# Patient Record
Sex: FEMALE | Race: WHITE | Employment: PART TIME | URBAN - METROPOLITAN AREA
[De-identification: names, ages, dates, MRNs, and addresses within clinical notes are randomized per-mention and may not be internally consistent; named-entity substitution may affect disease eponyms.]

---

## 2018-05-23 DIAGNOSIS — I10 ESSENTIAL HYPERTENSION: ICD-10-CM

## 2018-05-23 RX ORDER — ETODOLAC 400 MG/1
TABLET, FILM COATED ORAL
Qty: 60 TABLET | Refills: 5 | Status: SHIPPED | OUTPATIENT
Start: 2018-05-23 | End: 2019-01-29

## 2018-05-23 RX ORDER — AMLODIPINE BESYLATE AND BENAZEPRIL HYDROCHLORIDE 5; 10 MG/1; MG/1
CAPSULE ORAL
Qty: 30 CAPSULE | Refills: 5 | Status: SHIPPED | OUTPATIENT
Start: 2018-05-23 | End: 2018-11-16 | Stop reason: SDUPTHER

## 2018-05-23 RX ORDER — CYCLOBENZAPRINE HCL 10 MG
10 TABLET ORAL NIGHTLY PRN
Qty: 30 TABLET | Refills: 5 | Status: SHIPPED | OUTPATIENT
Start: 2018-05-23 | End: 2018-11-16 | Stop reason: SDUPTHER

## 2018-07-23 DIAGNOSIS — F41.9 ANXIETY: ICD-10-CM

## 2018-07-23 RX ORDER — FLUOXETINE HYDROCHLORIDE 20 MG/1
CAPSULE ORAL
Qty: 90 CAPSULE | Refills: 5 | Status: SHIPPED | OUTPATIENT
Start: 2018-07-23 | End: 2018-11-16 | Stop reason: SDUPTHER

## 2018-11-16 DIAGNOSIS — I10 ESSENTIAL HYPERTENSION: ICD-10-CM

## 2018-11-16 DIAGNOSIS — F41.9 ANXIETY: ICD-10-CM

## 2018-11-16 RX ORDER — CYCLOBENZAPRINE HCL 10 MG
10 TABLET ORAL NIGHTLY PRN
Qty: 30 TABLET | Refills: 5 | Status: SHIPPED | OUTPATIENT
Start: 2018-11-16 | End: 2019-05-17 | Stop reason: SDUPTHER

## 2018-11-16 RX ORDER — AMLODIPINE BESYLATE AND BENAZEPRIL HYDROCHLORIDE 5; 10 MG/1; MG/1
CAPSULE ORAL
Qty: 30 CAPSULE | Refills: 5 | Status: SHIPPED | OUTPATIENT
Start: 2018-11-16 | End: 2019-05-17 | Stop reason: SDUPTHER

## 2018-11-16 RX ORDER — FLUOXETINE HYDROCHLORIDE 20 MG/1
CAPSULE ORAL
Qty: 90 CAPSULE | Refills: 5 | Status: SHIPPED | OUTPATIENT
Start: 2018-11-16 | End: 2019-05-17 | Stop reason: SDUPTHER

## 2019-01-29 ENCOUNTER — OFFICE VISIT (OUTPATIENT)
Dept: FAMILY MEDICINE CLINIC | Age: 49
End: 2019-01-29

## 2019-01-29 VITALS
HEART RATE: 96 BPM | TEMPERATURE: 98 F | BODY MASS INDEX: 25.83 KG/M2 | HEIGHT: 65 IN | OXYGEN SATURATION: 99 % | DIASTOLIC BLOOD PRESSURE: 80 MMHG | WEIGHT: 155 LBS | SYSTOLIC BLOOD PRESSURE: 138 MMHG

## 2019-01-29 DIAGNOSIS — M25.50 ARTHRALGIA, UNSPECIFIED JOINT: Primary | ICD-10-CM

## 2019-01-29 PROCEDURE — 99213 OFFICE O/P EST LOW 20 MIN: CPT | Performed by: FAMILY MEDICINE

## 2019-01-29 RX ORDER — METRONIDAZOLE 500 MG/1
500 TABLET ORAL 2 TIMES DAILY
COMMUNITY
End: 2019-02-28

## 2019-01-29 ASSESSMENT — PATIENT HEALTH QUESTIONNAIRE - PHQ9
SUM OF ALL RESPONSES TO PHQ QUESTIONS 1-9: 0
SUM OF ALL RESPONSES TO PHQ QUESTIONS 1-9: 0
SUM OF ALL RESPONSES TO PHQ9 QUESTIONS 1 & 2: 0
2. FEELING DOWN, DEPRESSED OR HOPELESS: 0
1. LITTLE INTEREST OR PLEASURE IN DOING THINGS: 0

## 2019-02-28 ENCOUNTER — OFFICE VISIT (OUTPATIENT)
Dept: FAMILY MEDICINE CLINIC | Age: 49
End: 2019-02-28

## 2019-02-28 ENCOUNTER — HOSPITAL ENCOUNTER (OUTPATIENT)
Age: 49
Discharge: HOME | End: 2019-03-02

## 2019-02-28 VITALS
TEMPERATURE: 98.5 F | SYSTOLIC BLOOD PRESSURE: 138 MMHG | WEIGHT: 154 LBS | OXYGEN SATURATION: 98 % | BODY MASS INDEX: 25.63 KG/M2 | HEART RATE: 84 BPM | DIASTOLIC BLOOD PRESSURE: 84 MMHG

## 2019-02-28 DIAGNOSIS — H01.02B SQUAMOUS BLEPHARITIS OF UPPER AND LOWER EYELIDS OF BOTH EYES: ICD-10-CM

## 2019-02-28 DIAGNOSIS — R30.0 DYSURIA: Primary | ICD-10-CM

## 2019-02-28 DIAGNOSIS — H01.02A SQUAMOUS BLEPHARITIS OF UPPER AND LOWER EYELIDS OF BOTH EYES: ICD-10-CM

## 2019-02-28 DIAGNOSIS — R30.0 DYSURIA: ICD-10-CM

## 2019-02-28 LAB
BILIRUBIN, POC: NEGATIVE
BLOOD URINE, POC: NORMAL
CLARITY, POC: CLEAR
COLOR, POC: YELLOW
GLUCOSE URINE, POC: NEGATIVE
KETONES, POC: NEGATIVE
LEUKOCYTE EST, POC: NORMAL
NITRITE, POC: NEGATIVE
PH, POC: 5.5
PROTEIN, POC: NORMAL
SPECIFIC GRAVITY, POC: 1.01
UROBILINOGEN, POC: 0.2

## 2019-02-28 PROCEDURE — 87088 URINE BACTERIA CULTURE: CPT

## 2019-02-28 PROCEDURE — 81002 URINALYSIS NONAUTO W/O SCOPE: CPT | Performed by: FAMILY MEDICINE

## 2019-02-28 PROCEDURE — 99213 OFFICE O/P EST LOW 20 MIN: CPT | Performed by: FAMILY MEDICINE

## 2019-02-28 PROCEDURE — 87186 SC STD MICRODIL/AGAR DIL: CPT

## 2019-02-28 RX ORDER — NITROFURANTOIN 25; 75 MG/1; MG/1
100 CAPSULE ORAL 2 TIMES DAILY
Qty: 14 CAPSULE | Refills: 0 | Status: SHIPPED | OUTPATIENT
Start: 2019-02-28 | End: 2019-03-07

## 2019-03-03 LAB
ORGANISM: ABNORMAL
URINE CULTURE, ROUTINE: ABNORMAL
URINE CULTURE, ROUTINE: ABNORMAL

## 2019-03-11 ENCOUNTER — HOSPITAL ENCOUNTER (OUTPATIENT)
Age: 49
Discharge: HOME | End: 2019-03-13

## 2019-03-11 ENCOUNTER — OFFICE VISIT (OUTPATIENT)
Dept: FAMILY MEDICINE CLINIC | Age: 49
End: 2019-03-11

## 2019-03-11 VITALS
TEMPERATURE: 98.1 F | OXYGEN SATURATION: 95 % | SYSTOLIC BLOOD PRESSURE: 124 MMHG | HEART RATE: 100 BPM | BODY MASS INDEX: 24.79 KG/M2 | WEIGHT: 149 LBS | DIASTOLIC BLOOD PRESSURE: 80 MMHG

## 2019-03-11 DIAGNOSIS — R21 RASH: ICD-10-CM

## 2019-03-11 DIAGNOSIS — R21 RASH: Primary | ICD-10-CM

## 2019-03-11 LAB
ALBUMIN SERPL-MCNC: 4.1 G/DL (ref 3.5–5.2)
ALP BLD-CCNC: 111 U/L (ref 35–104)
ALT SERPL-CCNC: 52 U/L (ref 0–32)
ANION GAP SERPL CALCULATED.3IONS-SCNC: 16 MMOL/L (ref 7–16)
AST SERPL-CCNC: 34 U/L (ref 0–31)
BASOPHILS ABSOLUTE: 0 E9/L (ref 0–0.2)
BASOPHILS RELATIVE PERCENT: 0.5 % (ref 0–2)
BILIRUB SERPL-MCNC: 0.2 MG/DL (ref 0–1.2)
BILIRUBIN, POC: NEGATIVE
BLOOD URINE, POC: NEGATIVE
BUN BLDV-MCNC: 7 MG/DL (ref 6–20)
BURR CELLS: ABNORMAL
CALCIUM SERPL-MCNC: 9.3 MG/DL (ref 8.6–10.2)
CHLORIDE BLD-SCNC: 95 MMOL/L (ref 98–107)
CLARITY, POC: CLEAR
CO2: 23 MMOL/L (ref 22–29)
COLOR, POC: YELLOW
CREAT SERPL-MCNC: 0.6 MG/DL (ref 0.5–1)
EOSINOPHILS ABSOLUTE: 0.37 E9/L (ref 0.05–0.5)
EOSINOPHILS RELATIVE PERCENT: 3.5 % (ref 0–6)
GFR AFRICAN AMERICAN: >60
GFR NON-AFRICAN AMERICAN: >60 ML/MIN/1.73
GLUCOSE BLD-MCNC: 82 MG/DL (ref 74–99)
GLUCOSE URINE, POC: NEGATIVE
HCT VFR BLD CALC: 43.8 % (ref 34–48)
HEMOGLOBIN: 14.5 G/DL (ref 11.5–15.5)
KETONES, POC: NEGATIVE
LEUKOCYTE EST, POC: NEGATIVE
LYMPHOCYTES ABSOLUTE: 3.64 E9/L (ref 1.5–4)
LYMPHOCYTES RELATIVE PERCENT: 33.9 % (ref 20–42)
MCH RBC QN AUTO: 32.1 PG (ref 26–35)
MCHC RBC AUTO-ENTMCNC: 33.1 % (ref 32–34.5)
MCV RBC AUTO: 96.9 FL (ref 80–99.9)
METAMYELOCYTES RELATIVE PERCENT: 0.9 % (ref 0–1)
MONOCYTES ABSOLUTE: 1.07 E9/L (ref 0.1–0.95)
MONOCYTES RELATIVE PERCENT: 9.6 % (ref 2–12)
NEUTROPHILS ABSOLUTE: 5.67 E9/L (ref 1.8–7.3)
NEUTROPHILS RELATIVE PERCENT: 52.2 % (ref 43–80)
NITRITE, POC: NEGATIVE
PDW BLD-RTO: 12.6 FL (ref 11.5–15)
PH, POC: 6.5
PLATELET # BLD: 384 E9/L (ref 130–450)
PMV BLD AUTO: 10.9 FL (ref 7–12)
POIKILOCYTES: ABNORMAL
POTASSIUM SERPL-SCNC: 4.7 MMOL/L (ref 3.5–5)
PROTEIN, POC: NEGATIVE
RBC # BLD: 4.52 E12/L (ref 3.5–5.5)
SODIUM BLD-SCNC: 134 MMOL/L (ref 132–146)
SPECIFIC GRAVITY, POC: 1.01
TOTAL PROTEIN: 7.4 G/DL (ref 6.4–8.3)
UROBILINOGEN, POC: 0.2
WBC # BLD: 10.7 E9/L (ref 4.5–11.5)

## 2019-03-11 PROCEDURE — 85025 COMPLETE CBC W/AUTO DIFF WBC: CPT

## 2019-03-11 PROCEDURE — 81002 URINALYSIS NONAUTO W/O SCOPE: CPT | Performed by: FAMILY MEDICINE

## 2019-03-11 PROCEDURE — 99213 OFFICE O/P EST LOW 20 MIN: CPT | Performed by: FAMILY MEDICINE

## 2019-03-11 PROCEDURE — 96372 THER/PROPH/DIAG INJ SC/IM: CPT | Performed by: FAMILY MEDICINE

## 2019-03-11 PROCEDURE — 80053 COMPREHEN METABOLIC PANEL: CPT

## 2019-03-11 PROCEDURE — 36415 COLL VENOUS BLD VENIPUNCTURE: CPT | Performed by: FAMILY MEDICINE

## 2019-03-11 RX ORDER — PREDNISONE 10 MG/1
TABLET ORAL
Qty: 30 TABLET | Refills: 0 | Status: SHIPPED | OUTPATIENT
Start: 2019-03-11 | End: 2019-03-21

## 2019-03-11 RX ORDER — TRIAMCINOLONE ACETONIDE 40 MG/ML
40 INJECTION, SUSPENSION INTRA-ARTICULAR; INTRAMUSCULAR ONCE
Status: COMPLETED | OUTPATIENT
Start: 2019-03-11 | End: 2019-03-11

## 2019-03-11 RX ORDER — HYDROXYZINE HYDROCHLORIDE 25 MG/1
25 TABLET, FILM COATED ORAL EVERY 8 HOURS PRN
Qty: 30 TABLET | Refills: 1 | Status: SHIPPED | OUTPATIENT
Start: 2019-03-11 | End: 2019-03-21

## 2019-03-11 RX ADMIN — TRIAMCINOLONE ACETONIDE 40 MG: 40 INJECTION, SUSPENSION INTRA-ARTICULAR; INTRAMUSCULAR at 12:01

## 2019-03-22 ENCOUNTER — TELEPHONE (OUTPATIENT)
Dept: FAMILY MEDICINE CLINIC | Age: 49
End: 2019-03-22

## 2019-04-10 ENCOUNTER — HOSPITAL ENCOUNTER (OUTPATIENT)
Age: 49
Discharge: HOME | End: 2019-04-12

## 2019-04-10 ENCOUNTER — NURSE ONLY (OUTPATIENT)
Dept: FAMILY MEDICINE CLINIC | Age: 49
End: 2019-04-10

## 2019-04-10 DIAGNOSIS — M25.50 ARTHRALGIA, UNSPECIFIED JOINT: ICD-10-CM

## 2019-04-10 DIAGNOSIS — M25.50 ARTHRALGIA, UNSPECIFIED JOINT: Primary | ICD-10-CM

## 2019-04-10 PROCEDURE — 86140 C-REACTIVE PROTEIN: CPT

## 2019-04-10 PROCEDURE — 85651 RBC SED RATE NONAUTOMATED: CPT

## 2019-04-10 PROCEDURE — 86431 RHEUMATOID FACTOR QUANT: CPT

## 2019-04-10 PROCEDURE — 36415 COLL VENOUS BLD VENIPUNCTURE: CPT | Performed by: FAMILY MEDICINE

## 2019-04-11 LAB
C-REACTIVE PROTEIN: 0.2 MG/DL (ref 0–0.4)
RHEUMATOID FACTOR: 17 IU/ML (ref 0–13)
SEDIMENTATION RATE, ERYTHROCYTE: 4 MM/HR (ref 0–20)

## 2019-05-14 ENCOUNTER — OFFICE VISIT (OUTPATIENT)
Dept: FAMILY MEDICINE CLINIC | Age: 49
End: 2019-05-14

## 2019-05-14 VITALS
TEMPERATURE: 97.7 F | SYSTOLIC BLOOD PRESSURE: 142 MMHG | HEART RATE: 91 BPM | OXYGEN SATURATION: 98 % | WEIGHT: 154.2 LBS | BODY MASS INDEX: 25.69 KG/M2 | HEIGHT: 65 IN | DIASTOLIC BLOOD PRESSURE: 88 MMHG

## 2019-05-14 DIAGNOSIS — L23.9 ALLERGIC CONTACT DERMATITIS, UNSPECIFIED TRIGGER: Primary | ICD-10-CM

## 2019-05-14 DIAGNOSIS — L29.9 LOCALIZED PRURITUS: ICD-10-CM

## 2019-05-14 DIAGNOSIS — I10 ESSENTIAL HYPERTENSION: ICD-10-CM

## 2019-05-14 PROCEDURE — 99213 OFFICE O/P EST LOW 20 MIN: CPT | Performed by: NURSE PRACTITIONER

## 2019-05-14 PROCEDURE — 96372 THER/PROPH/DIAG INJ SC/IM: CPT | Performed by: NURSE PRACTITIONER

## 2019-05-14 RX ORDER — PREDNISONE 10 MG/1
TABLET ORAL
Qty: 18 TABLET | Refills: 0 | Status: SHIPPED | OUTPATIENT
Start: 2019-05-14 | End: 2019-06-05

## 2019-05-14 RX ORDER — METHYLPREDNISOLONE ACETATE 40 MG/ML
40 INJECTION, SUSPENSION INTRA-ARTICULAR; INTRALESIONAL; INTRAMUSCULAR; SOFT TISSUE ONCE
Status: COMPLETED | OUTPATIENT
Start: 2019-05-14 | End: 2019-05-14

## 2019-05-14 RX ADMIN — METHYLPREDNISOLONE ACETATE 40 MG: 40 INJECTION, SUSPENSION INTRA-ARTICULAR; INTRALESIONAL; INTRAMUSCULAR; SOFT TISSUE at 09:15

## 2019-05-14 ASSESSMENT — ENCOUNTER SYMPTOMS
GASTROINTESTINAL NEGATIVE: 1
PHOTOPHOBIA: 0
EYE REDNESS: 0
EYE ITCHING: 1
EYE DISCHARGE: 0
RESPIRATORY NEGATIVE: 1

## 2019-05-14 NOTE — PROGRESS NOTES
Penicillins     Bactrim [Sulfamethoxazole-Trimethoprim] Nausea And Vomiting and Other (See Comments)    Levaquin [Levofloxacin] Hives and Rash    Macrobid [Nitrofurantoin] Rash       Family History   Problem Relation Age of Onset    Heart Disease Father         CHF       Past Surgical History:   Procedure Laterality Date    HYSTERECTOMY      OVARIAN CYST REMOVAL      benign mass removal       Social History     Tobacco Use    Smoking status: Current Every Day Smoker     Packs/day: 0.50     Types: Cigarettes    Smokeless tobacco: Never Used   Substance Use Topics    Alcohol use: Yes     Comment: occasional    Drug use: No       ROS:      Review of Systems   HENT: Negative. Eyes: Positive for itching. Negative for photophobia, discharge, redness and visual disturbance. Respiratory: Negative. Gastrointestinal: Negative. Musculoskeletal: Positive for myalgias. Psychiatric/Behavioral: The patient is nervous/anxious. Vitals:    05/14/19 0848   BP: (!) 142/88   Pulse: 91   Temp: 97.7 °F (36.5 °C)   SpO2: 98%   Weight: 154 lb 3.2 oz (69.9 kg)   Height: 5' 5\" (1.651 m)     Estimated body mass index is 25.66 kg/m² as calculated from the following:    Height as of this encounter: 5' 5\" (1.651 m). Weight as of this encounter: 154 lb 3.2 oz (69.9 kg). PHYSICAL EXAM:    VS:  Blood pressure (!) 142/88, pulse 91, temperature 97.7 °F (36.5 °C), height 5' 5\" (1.651 m), weight 154 lb 3.2 oz (69.9 kg), SpO2 98 %, not currently breastfeeding. Physical Exam   Constitutional: She is oriented to person, place, and time. She appears well-developed and well-nourished. She appears distressed. HENT:   Head: Normocephalic and atraumatic. Eyes: Pupils are equal, round, and reactive to light. Conjunctivae and EOM are normal. Right eye exhibits no discharge. Left eye exhibits no discharge. Neck: Normal range of motion. Cardiovascular: Normal rate and regular rhythm. Murmur heard.   Pulmonary/Chest: Effort normal and breath sounds normal.   Lymphadenopathy:     She has no cervical adenopathy. Neurological: She is alert and oriented to person, place, and time. No cranial nerve deficit. Skin: Rash noted. There is erythema. Eczematous type rash on eye lids and around both eyes. ASSESSMENT/PLAN:    Ramírez Bowers was seen today for conjunctivitis. Diagnoses and all orders for this visit:    Allergic contact dermatitis, unspecified trigger    Essential hypertension    Localized pruritus    Other orders  -     methylPREDNISolone acetate (DEPO-MEDROL) injection 40 mg  -     predniSONE (DELTASONE) 10 MG tablet; Take 3 tabs X 2 days, 2 tabs X 2 days 1 tab X 2 days 1/2 Tab X 2 days     Cool compresses. OTC cortisone cream and explained how to use properly. Get rid of current eye cream. If no improvement may  Need to see derm  Benadryl for itching  Monitor BP at home    No orders of the defined types were placed in this encounter. No follow-ups on file. An electronic signature was used to authenticate this note.     --KASSI Lopez - CNP on 5/14/2019 at 12:28 PM

## 2019-05-17 DIAGNOSIS — I10 ESSENTIAL HYPERTENSION: ICD-10-CM

## 2019-05-17 DIAGNOSIS — F41.9 ANXIETY: ICD-10-CM

## 2019-05-17 RX ORDER — FLUOXETINE HYDROCHLORIDE 20 MG/1
CAPSULE ORAL
Qty: 90 CAPSULE | Refills: 5 | Status: SHIPPED | OUTPATIENT
Start: 2019-05-17 | End: 2019-11-25 | Stop reason: SDUPTHER

## 2019-05-17 RX ORDER — AMLODIPINE BESYLATE AND BENAZEPRIL HYDROCHLORIDE 5; 10 MG/1; MG/1
CAPSULE ORAL
Qty: 30 CAPSULE | Refills: 5 | Status: SHIPPED | OUTPATIENT
Start: 2019-05-17 | End: 2019-11-25 | Stop reason: SDUPTHER

## 2019-05-17 RX ORDER — CYCLOBENZAPRINE HCL 10 MG
10 TABLET ORAL NIGHTLY PRN
Qty: 30 TABLET | Refills: 5 | Status: SHIPPED | OUTPATIENT
Start: 2019-05-17 | End: 2019-11-15 | Stop reason: SDUPTHER

## 2019-05-30 ENCOUNTER — TELEPHONE (OUTPATIENT)
Dept: ADMINISTRATIVE | Age: 49
End: 2019-05-30

## 2019-05-30 NOTE — TELEPHONE ENCOUNTER
Pt called to request an appt, she has a rash and is extremely fatigued, PCP unavailable, pt has auto immune issues, declined [facility], wants to be seen by Dr Cordelia Hansen. [facility] transferred her call to clinical line.

## 2019-06-05 ENCOUNTER — HOSPITAL ENCOUNTER (OUTPATIENT)
Age: 49
Discharge: HOME | End: 2019-06-07

## 2019-06-05 ENCOUNTER — OFFICE VISIT (OUTPATIENT)
Dept: FAMILY MEDICINE CLINIC | Age: 49
End: 2019-06-05

## 2019-06-05 VITALS
DIASTOLIC BLOOD PRESSURE: 94 MMHG | BODY MASS INDEX: 25.66 KG/M2 | WEIGHT: 154 LBS | OXYGEN SATURATION: 98 % | HEART RATE: 93 BPM | TEMPERATURE: 97.1 F | HEIGHT: 65 IN | SYSTOLIC BLOOD PRESSURE: 152 MMHG

## 2019-06-05 DIAGNOSIS — R53.82 CHRONIC FATIGUE: Primary | ICD-10-CM

## 2019-06-05 DIAGNOSIS — R53.82 CHRONIC FATIGUE: ICD-10-CM

## 2019-06-05 PROCEDURE — 86038 ANTINUCLEAR ANTIBODIES: CPT

## 2019-06-05 PROCEDURE — 36415 COLL VENOUS BLD VENIPUNCTURE: CPT

## 2019-06-05 PROCEDURE — 82552 ASSAY OF CPK IN BLOOD: CPT

## 2019-06-05 PROCEDURE — 82550 ASSAY OF CK (CPK): CPT

## 2019-06-05 PROCEDURE — 86200 CCP ANTIBODY: CPT

## 2019-06-05 PROCEDURE — 96372 THER/PROPH/DIAG INJ SC/IM: CPT | Performed by: FAMILY MEDICINE

## 2019-06-05 PROCEDURE — 99213 OFFICE O/P EST LOW 20 MIN: CPT | Performed by: FAMILY MEDICINE

## 2019-06-05 RX ORDER — PREDNISONE 20 MG/1
20 TABLET ORAL DAILY
Qty: 30 TABLET | Refills: 2 | Status: SHIPPED | OUTPATIENT
Start: 2019-06-05 | End: 2019-07-05

## 2019-06-05 RX ORDER — METHYLPREDNISOLONE ACETATE 40 MG/ML
40 INJECTION, SUSPENSION INTRA-ARTICULAR; INTRALESIONAL; INTRAMUSCULAR; SOFT TISSUE ONCE
Status: COMPLETED | OUTPATIENT
Start: 2019-06-05 | End: 2019-06-05

## 2019-06-05 RX ADMIN — METHYLPREDNISOLONE ACETATE 40 MG: 40 INJECTION, SUSPENSION INTRA-ARTICULAR; INTRALESIONAL; INTRAMUSCULAR; SOFT TISSUE at 13:44

## 2019-06-05 ASSESSMENT — ENCOUNTER SYMPTOMS
SORE THROAT: 0
NAUSEA: 0
SHORTNESS OF BREATH: 0
CONSTIPATION: 0
DIARRHEA: 0
ABDOMINAL PAIN: 0
BLOOD IN STOOL: 0
VOMITING: 0
COUGH: 0
BACK PAIN: 0
PHOTOPHOBIA: 0

## 2019-06-05 NOTE — PROGRESS NOTES
tablet Take 1 tablet by mouth daily Yes Juan Alberto Joseph DO   cyclobenzaprine (FLEXERIL) 10 MG tablet Take 1 tablet by mouth nightly as needed for Muscle spasms  Edelmira Miller MD   amLODIPine-benazepril (LOTREL) 5-10 MG per capsule take 1 capsule by mouth once daily for blood pressure. Edelmira Miller MD   FLUoxetine (PROZAC) 20 MG capsule Take 1 capsule by mouth every morning and 2 capsules every evening. Edelmira Miller MD   hypromellose (ARTIFICIAL TEARS) 0.4 % SOLN ophthalmic solution Place 1 drop into both eyes every 4 hours as needed (dry eyes)  Edelmira Miller MD   Tens Unit MISC by Does not apply route  Ana Calle MD   vitamin D (CHOLECALCIFEROL) 1000 UNIT TABS tablet Take 1,000 Units by mouth every morning   Historical Provider, MD   Multiple Vitamins-Minerals (HAIR/SKIN/NAILS PO) Take 1 tablet by mouth every morning   Historical Provider, MD   B Complex Vitamins (VITAMIN B COMPLEX PO) Take 1 tablet by mouth every morning   Historical Provider, MD   CALCIUM-VITAMIN D PO Take 1 tablet by mouth every morning   Historical Provider, MD        Social History     Tobacco Use    Smoking status: Current Every Day Smoker     Packs/day: 0.50     Types: Cigarettes    Smokeless tobacco: Never Used   Substance Use Topics    Alcohol use: Yes     Comment: occasional        Vitals:    06/05/19 1305   BP: (!) 152/94   Pulse: 93   Temp: 97.1 °F (36.2 °C)   SpO2: 98%   Weight: 154 lb (69.9 kg)   Height: 5' 5\" (1.651 m)     Estimated body mass index is 25.63 kg/m² as calculated from the following:    Height as of this encounter: 5' 5\" (1.651 m). Weight as of this encounter: 154 lb (69.9 kg). Physical Exam   Constitutional: She is oriented to person, place, and time. HENT:   Head: Normocephalic and atraumatic. Eyes: Pupils are equal, round, and reactive to light. Conjunctivae and EOM are normal. No scleral icterus. Neck: Neck supple. No thyromegaly present.    Cardiovascular: Normal rate, regular rhythm, normal heart sounds and intact distal pulses. No murmur heard. Pulmonary/Chest: Effort normal and breath sounds normal. She has no rales. Abdominal: Soft. Bowel sounds are normal. She exhibits no distension. There is no tenderness. Musculoskeletal: Normal range of motion. She exhibits no edema. Lymphadenopathy:     She has no cervical adenopathy. Neurological: She is alert and oriented to person, place, and time. No cranial nerve deficit. Skin: Skin is warm and dry. Rash noted. There is erythema. Psychiatric: Judgment normal.       ASSESSMENT/PLAN:  1. Chronic fatigue  At this time we will treat symptomatically as the patient is currently very uncomfortable. JUAN, CCP, and CK ordered to rule out dermatomyositis versus Sjogren's versus lupus. Follow-up with PCP in 9 days or return to clinic sooner for any acute worsening of symptoms in the interim. - JUAN Titer; Future  - Cyclic Citrul Peptide Antibody, IgG; Future  - CK ISOENZYMES; Future  - predniSONE (DELTASONE) 20 MG tablet; Take 1 tablet by mouth daily  Dispense: 30 tablet; Refill: 2  - methylPREDNISolone acetate (DEPO-MEDROL) injection 40 mg      Return if symptoms worsen or fail to improve. An electronic signature was used to authenticate this note.     --Juan Luis Joseph, DO on 6/5/2019 at 1:35 PM

## 2019-06-07 LAB — ANTI-NUCLEAR ANTIBODY (ANA): NEGATIVE

## 2019-06-08 LAB
% CKMB: 0 % (ref 0–4)
CCP IGG ANTIBODIES: 2 UNITS (ref 0–19)
CK-BB: 0 % (ref 0–0)
CK-MACRO TYPE I: 0 % (ref 0–0)
CK-MACRO TYPE II: 0 % (ref 0–0)
CK-MM: 100 % (ref 96–100)
TOTAL CK: 72 U/L (ref 20–180)

## 2019-06-14 ENCOUNTER — OFFICE VISIT (OUTPATIENT)
Dept: FAMILY MEDICINE CLINIC | Age: 49
End: 2019-06-14

## 2019-06-14 DIAGNOSIS — M25.50 POLYARTHRALGIA: ICD-10-CM

## 2019-06-14 DIAGNOSIS — M35.3 POLYMYALGIA (HCC): Primary | ICD-10-CM

## 2019-06-14 DIAGNOSIS — M33.90 HELIOTROPE EYELID RASH (HCC): ICD-10-CM

## 2019-06-14 PROCEDURE — 99213 OFFICE O/P EST LOW 20 MIN: CPT | Performed by: FAMILY MEDICINE

## 2019-06-14 NOTE — PROGRESS NOTES
[facility]  Office Progress Note - Dr. Kenneth Severino  6/14/19    CC:   Chief Complaint   Patient presents with    Rash        S: Patient presents for follow-up on an eyelid rash. Previously believed by me to the blepharitis. She shows me a photo today that shows complete upper and lower eyelid involvement with erythema and mild swelling. This is something different than blepharitis. She went to urgent care since I last saw her. She had some lab work done which included normal creatinine kinase enzymes, and normal anti-CCP antibodies. She was started on prednisone 20 mg daily and the rash improved. Historically the rash improved with prednisone use as well, but with tapering the dose and then discontinuation the rash came back. Other rheumatologic work-up in the past has included:    Abnormal labs:   A minimally elevated rheumatoid factor at 17 in April of this year. Normal labs:  Sed rate of 4 in April of this year. CRP of 0.2 in April of this year. JUAN negative in February 15, November 15, in June 2019. Anti-double-stranded DNA, anti-Marta 1, anti-RNP, scleroderma S CL 70, anti-Smith, anti-Ro, anti-La -all negative in November 2015. She continues to note excessive fatigue, polymyalgia, polyarthralgias. Today, she has no rash and is feeling generally well with decreased pain as she has in the past with prednisone therapy.     Past Medical History:   Diagnosis Date    Anxiety     Depression     Herniated disc     Hypertension     Pneumonia     Raynaud's disease     TMJ (dislocation of temporomandibular joint)        Family History   Problem Relation Age of Onset    Heart Disease Father         CHF       Past Surgical History:   Procedure Laterality Date    HYSTERECTOMY      OVARIAN CYST REMOVAL      benign mass removal       Social History     Tobacco Use    Smoking status: Current Every Day Smoker     Packs/day: 0.50     Types: Cigarettes    Smokeless tobacco: Never Used   Substance Use Topics    Alcohol use: Yes     Comment: occasional    Drug use: No       ROS:  No CP, No palpitations,   No sob, No cough,   No abd pain, +heartburn,   Occasional headaches,   No tingling, No numbness, No weakness,   No bowel changes, No hematochezia, No melena,  No bladder changes, No hematuria  No skin rashes, No skin lesions. No vision changes, No hearing changes,   No polyuria, polydipsia, polyphagia. Stable mood. ROS otherwise negative unless as listed in HPI. Chart reviewed and updated where appropriate for PMH, Fam, and Soc Hx. Physical Exam   There were no vitals taken for this visit. - Unsure of why this happened - may not have been documented. Wt Readings from Last 3 Encounters:   06/14/19 155 lb (70.3 kg)   06/05/19 154 lb (69.9 kg)   05/14/19 154 lb 3.2 oz (69.9 kg)       Constitutional:    She is oriented to person, place, and time. She appears well-developed and well-nourished. HENT:    Nose: Nose normal.    Mouth/Throat: Oropharynx is clear and moist.   Eyes:    Conjunctivae are normal.    Pupils are equal, round, and reactive to light. EOMI. Neck:    Normal range of motion. No thyromegaly or nodules noted. No bruit. Cardiovascular:    2 out of 6 systolic murmur best heard at the right upper sternal border. Normal rate, regular rhythm and normal heart sounds. . No gallop and no friction rub. Pulmonary/Chest:    Effort normal and breath sounds normal.    No wheezes. No rales or rhonchi. Abdominal:    Soft. Bowel sounds are normal.    No distension. No tenderness. Musculoskeletal:    Normal range of motion. No joint swelling noted. No peripheral edema. Skin:    Skin is warm and dry. No rashes, lesions. Psychiatric:    She has a normal mood and affect. Normal groom and dress.     Current Outpatient Medications on File Prior to Visit   Medication Sig Dispense Refill    predniSONE (DELTASONE) 20 MG tablet Take 1 tablet by mouth daily 30 tablet 2    cyclobenzaprine (FLEXERIL) 10 MG tablet Take 1 tablet by mouth nightly as needed for Muscle spasms 30 tablet 5    amLODIPine-benazepril (LOTREL) 5-10 MG per capsule take 1 capsule by mouth once daily for blood pressure. 30 capsule 5    FLUoxetine (PROZAC) 20 MG capsule Take 1 capsule by mouth every morning and 2 capsules every evening. 90 capsule 5    hypromellose (ARTIFICIAL TEARS) 0.4 % SOLN ophthalmic solution Place 1 drop into both eyes every 4 hours as needed (dry eyes) 15 mL 0    Tens Unit MISC by Does not apply route 1 each 0    vitamin D (CHOLECALCIFEROL) 1000 UNIT TABS tablet Take 1,000 Units by mouth every morning       Multiple Vitamins-Minerals (HAIR/SKIN/NAILS PO) Take 1 tablet by mouth every morning       B Complex Vitamins (VITAMIN B COMPLEX PO) Take 1 tablet by mouth every morning       CALCIUM-VITAMIN D PO Take 1 tablet by mouth every morning        No current facility-administered medications on file prior to visit. Patient Active Problem List   Diagnosis Code    Anxiety F41.9    HTN (hypertension) I10    Raynaud's phenomenon I73.00    Chronic daily headache R51    Cigarette smoker F17.210    Depression F32.9    Elevated LFTs R94.5        Assessment / Carmela Hurd was seen today for rash. Diagnoses and all orders for this visit:    Polymyalgia [facility])  -     Gayle Díaz MD, Rheumatology, [address] [place] [address], Carlie Neff MD, Rheumatology, [address] [place] [address] eyelid rash [facility])  -     Gayle Díaz MD, Rheumatology, [facility]      I think the patient probably has some sort of a rheumatologic disorder. I have not been able to determine what it is. See lab results in the HPI. Her photograph of her eyelid rash when it was severe was quite striking and I now believe that it is not blepharitis.   Recent muscle enzyme evaluation was not supportive of dermatomyositis, but her eyelid rash may have been a heliotrope type rash. LDH and aldolase have not yet been checked and that could be a next step in addition to other rheumatologic labs deemed necessary by rheumatology. Also want to entertain the possibility that she has lupus. I am going to continue her on 20 mg of prednisone daily for 1 month and then decrease her to 15 mg daily for 1 month and we will see how things go from there. She perceives benefit in pain levels and resolution of the rash when on steroids. We did discuss many of the long-term side effects of chronic steroid therapy and she is willing to undergo those possibilities for now. Patient counseled to follow up sooner or seek more acute care if symptoms worsening. Electronically signed by Ana Huang MD on 6/14/2019    This note may have been created using dictation software.  Efforts were made to reduce grammatical or syntax errors, but some may persist.

## 2019-06-27 ENCOUNTER — HOSPITAL ENCOUNTER (OUTPATIENT)
Age: 49
Discharge: HOME | End: 2019-06-29

## 2019-06-27 ENCOUNTER — OFFICE VISIT (OUTPATIENT)
Dept: FAMILY MEDICINE CLINIC | Age: 49
End: 2019-06-27

## 2019-06-27 VITALS
HEIGHT: 65 IN | DIASTOLIC BLOOD PRESSURE: 66 MMHG | WEIGHT: 154 LBS | TEMPERATURE: 98.2 F | HEART RATE: 107 BPM | OXYGEN SATURATION: 98 % | BODY MASS INDEX: 25.66 KG/M2 | SYSTOLIC BLOOD PRESSURE: 120 MMHG

## 2019-06-27 DIAGNOSIS — M25.50 POLYARTHRALGIA: Primary | ICD-10-CM

## 2019-06-27 DIAGNOSIS — R53.81 MALAISE: ICD-10-CM

## 2019-06-27 DIAGNOSIS — M25.50 POLYARTHRALGIA: ICD-10-CM

## 2019-06-27 LAB
ALBUMIN SERPL-MCNC: 4.5 G/DL (ref 3.5–5.2)
ALP BLD-CCNC: 68 U/L (ref 35–104)
ALT SERPL-CCNC: 20 U/L (ref 0–32)
ANION GAP SERPL CALCULATED.3IONS-SCNC: 17 MMOL/L (ref 7–16)
AST SERPL-CCNC: 18 U/L (ref 0–31)
BASOPHILS ABSOLUTE: 0.02 E9/L (ref 0–0.2)
BASOPHILS RELATIVE PERCENT: 0.2 % (ref 0–2)
BILIRUB SERPL-MCNC: 0.2 MG/DL (ref 0–1.2)
BUN BLDV-MCNC: 16 MG/DL (ref 6–20)
CALCIUM SERPL-MCNC: 9.8 MG/DL (ref 8.6–10.2)
CHLORIDE BLD-SCNC: 100 MMOL/L (ref 98–107)
CO2: 21 MMOL/L (ref 22–29)
CREAT SERPL-MCNC: 0.6 MG/DL (ref 0.5–1)
EOSINOPHILS ABSOLUTE: 0.04 E9/L (ref 0.05–0.5)
EOSINOPHILS RELATIVE PERCENT: 0.4 % (ref 0–6)
FOLATE: 19.8 NG/ML (ref 4.8–24.2)
GFR AFRICAN AMERICAN: >60
GFR NON-AFRICAN AMERICAN: >60 ML/MIN/1.73
GLUCOSE BLD-MCNC: 141 MG/DL (ref 74–99)
HCT VFR BLD CALC: 44.5 % (ref 34–48)
HEMOGLOBIN: 14.7 G/DL (ref 11.5–15.5)
IMMATURE GRANULOCYTES #: 0.05 E9/L
IMMATURE GRANULOCYTES %: 0.5 % (ref 0–5)
LYMPHOCYTES ABSOLUTE: 1.68 E9/L (ref 1.5–4)
LYMPHOCYTES RELATIVE PERCENT: 17 % (ref 20–42)
MCH RBC QN AUTO: 32.2 PG (ref 26–35)
MCHC RBC AUTO-ENTMCNC: 33 % (ref 32–34.5)
MCV RBC AUTO: 97.6 FL (ref 80–99.9)
MONOCYTES ABSOLUTE: 0.58 E9/L (ref 0.1–0.95)
MONOCYTES RELATIVE PERCENT: 5.9 % (ref 2–12)
NEUTROPHILS ABSOLUTE: 7.51 E9/L (ref 1.8–7.3)
NEUTROPHILS RELATIVE PERCENT: 76 % (ref 43–80)
PDW BLD-RTO: 13.8 FL (ref 11.5–15)
PLATELET # BLD: 327 E9/L (ref 130–450)
PMV BLD AUTO: 9.9 FL (ref 7–12)
POTASSIUM SERPL-SCNC: 4.5 MMOL/L (ref 3.5–5)
RBC # BLD: 4.56 E12/L (ref 3.5–5.5)
SODIUM BLD-SCNC: 138 MMOL/L (ref 132–146)
TOTAL PROTEIN: 7.2 G/DL (ref 6.4–8.3)
VITAMIN B-12: 559 PG/ML (ref 211–946)
WBC # BLD: 9.9 E9/L (ref 4.5–11.5)

## 2019-06-27 PROCEDURE — 99213 OFFICE O/P EST LOW 20 MIN: CPT | Performed by: FAMILY MEDICINE

## 2019-06-27 PROCEDURE — 36415 COLL VENOUS BLD VENIPUNCTURE: CPT | Performed by: FAMILY MEDICINE

## 2019-06-27 PROCEDURE — 82746 ASSAY OF FOLIC ACID SERUM: CPT

## 2019-06-27 PROCEDURE — 85651 RBC SED RATE NONAUTOMATED: CPT

## 2019-06-27 PROCEDURE — 85025 COMPLETE CBC W/AUTO DIFF WBC: CPT

## 2019-06-27 PROCEDURE — 82607 VITAMIN B-12: CPT

## 2019-06-27 PROCEDURE — 86618 LYME DISEASE ANTIBODY: CPT

## 2019-06-27 PROCEDURE — 80053 COMPREHEN METABOLIC PANEL: CPT

## 2019-06-27 NOTE — PROGRESS NOTES
[facility]  Office Progress Note - Dr. Deanna Freeman  6/27/19    CC:   Chief Complaint   Patient presents with    Headache    Fatigue        S: She isnt feeling well  Not specifically sick, but just doesn't \"feel well\"  Eye rash came back again. Still notes thickness to her eye secretions. Blurs her vision. Has had chills and hot flashes on and off.   + \"brain fog\"   Trouble concentrating at work. Sleeps well, but doesn't feel rested in the morning. Feels like she can \"take 10 naps a day. \"  + snoring Hx. Has gasped herself awake with a choking sensation twice in past 6 months. No weight loss. She continues 20mg prednisone daily. We were planning on slowly tapering that over time. Continues to have same complaints as previous. Essentially there is nothing different today than at her previous appointments. She just feels unwell. She was referred to rheumatology for an opinion, but review of that referral notes shows that the rheumatologist is not accepting her because she is going to be moving. Essentially, without paying out-of-pocket she now has no rheumatology options.            Past Medical History:   Diagnosis Date    Anxiety     Depression     Herniated disc     Hypertension     Pneumonia     Raynaud's disease     TMJ (dislocation of temporomandibular joint)        Family History   Problem Relation Age of Onset    Heart Disease Father         CHF       Past Surgical History:   Procedure Laterality Date    HYSTERECTOMY      OVARIAN CYST REMOVAL      benign mass removal       Social History     Tobacco Use    Smoking status: Current Every Day Smoker     Packs/day: 0.50     Types: Cigarettes    Smokeless tobacco: Never Used   Substance Use Topics    Alcohol use: Yes     Comment: occasional    Drug use: No       ROS:  No CP, No palpitations,   No sob, No cough,   No abd pain, No heartburn,   +headaches, + myalgia,+ arthralgia  No tingling, No numbness, No weakness,   No bowel changes, No hematochezia, No melena,  No bladder changes, No hematuria  No skin rashes, No skin lesions. No vision changes, No hearing changes,   No polyuria, polydipsia, polyphagia. Stable mood. ROS otherwise negative unless as listed in HPI. Chart reviewed and updated where appropriate for PMH, Fam, and Soc Hx. Physical Exam   /66 (Site: Right Upper Arm, Position: Sitting, Cuff Size: Medium Adult)   Pulse 107   Temp 98.2 °F (36.8 °C) (Oral)   Ht 5' 5\" (1.651 m)   Wt 154 lb (69.9 kg)   SpO2 98%   BMI 25.63 kg/m²   Wt Readings from Last 3 Encounters:   06/27/19 154 lb (69.9 kg)   06/14/19 155 lb (70.3 kg)   06/05/19 154 lb (69.9 kg)       Constitutional:    She is oriented to person, place, and time. She appears well-developed and well-nourished. HENT:    Nose: Nose normal.    Mouth/Throat: Oropharynx is clear and moist.   Eyes:    Conjunctivae are normal.    Pupils are equal, round, and reactive to light. EOMI. Neck:    Normal range of motion. No thyromegaly or nodules noted. No bruit. Cardiovascular:    Normal rate, regular rhythm and normal heart sounds. No murmur. No gallop and no friction rub. Pulmonary/Chest:    Effort normal and breath sounds normal.    No wheezes. No rales or rhonchi. Abdominal:    Soft. Bowel sounds are normal.    No distension. No tenderness. Musculoskeletal:    Normal range of motion. No joint swelling noted. No peripheral edema. Neurological:    She is alert and oriented to person, place, and time. Motor and sensation grossly intact. Normal Gait. Skin:    Skin is warm and dry. No rashes, lesions. Psychiatric:    She has a normal mood and affect. Normal groom and dress.     Current Outpatient Medications on File Prior to Visit   Medication Sig Dispense Refill    vitamin A 01478 units capsule Take 10,000 Units by mouth daily      predniSONE (DELTASONE) 20 MG tablet Take 1 tablet by mouth daily 30 tablet 2    cyclobenzaprine (FLEXERIL) 10 MG tablet Take 1 tablet by mouth nightly as needed for Muscle spasms 30 tablet 5    amLODIPine-benazepril (LOTREL) 5-10 MG per capsule take 1 capsule by mouth once daily for blood pressure. 30 capsule 5    FLUoxetine (PROZAC) 20 MG capsule Take 1 capsule by mouth every morning and 2 capsules every evening. 90 capsule 5    hypromellose (ARTIFICIAL TEARS) 0.4 % SOLN ophthalmic solution Place 1 drop into both eyes every 4 hours as needed (dry eyes) 15 mL 0    Tens Unit MISC by Does not apply route 1 each 0    vitamin D (CHOLECALCIFEROL) 1000 UNIT TABS tablet Take 1,000 Units by mouth every morning       Multiple Vitamins-Minerals (HAIR/SKIN/NAILS PO) Take 1 tablet by mouth every morning       B Complex Vitamins (VITAMIN B COMPLEX PO) Take 1 tablet by mouth every morning       CALCIUM-VITAMIN D PO Take 1 tablet by mouth every morning        No current facility-administered medications on file prior to visit. Patient Active Problem List   Diagnosis Code    Anxiety F41.9    HTN (hypertension) I10    Raynaud's phenomenon I73.00    Chronic daily headache R51    Cigarette smoker F17.210    Depression F32.9    Elevated LFTs R94.5        Julian / Emeli Sherwood was seen today for headache and fatigue. Diagnoses and all orders for this visit:    Polyarthralgia  -     Lyme Disease Acute Reflexive Panel; Future  -     CBC Auto Differential; Future  -     Comprehensive Metabolic Panel; Future  -     Vitamin B12 & Folate; Future  -     SEDIMENTATION RATE; Future    Malaise    Patient presents for evaluation of same complaints. She is unable to provide anything specific today. She has no localizing symptoms. Her vital signs are normal.  Her lab work has continued to come back normal.  She is not having any major mood changes. She does not have a rheumatologist in network but she will not have to pay out-of-pocket for and she is not willing to do that.   No abnormal physical exam findings today. We will recheck some lab work to assure that she has not developed any change. I continue to feel that she has something wrong, but have found nothing objective point us in a direction. Consideration of Lyme's disease today. Labs reviewed and all normal.    Keep regular follow-up. Advise me if new specific symptoms that might be distorted and answered. Again told her that if she is willing to pay out-of-pocket for services, I can refer her to other specialists. Patient counseled to follow up sooner or seek more acute care if symptoms worsening. Electronically signed by Xu Winslow MD on 6/30/2019    This note may have been created using dictation software.  Efforts were made to reduce grammatical or syntax errors, but some may persist.

## 2019-06-28 LAB — SEDIMENTATION RATE, ERYTHROCYTE: 0 MM/HR (ref 0–20)

## 2019-06-30 LAB — LYME, EIA: 0.43 LIV (ref 0–1.2)

## 2019-07-01 ENCOUNTER — TELEPHONE (OUTPATIENT)
Dept: FAMILY MEDICINE CLINIC | Age: 49
End: 2019-07-01

## 2019-07-01 DIAGNOSIS — M25.50 POLYARTHRALGIA: Primary | ICD-10-CM

## 2019-07-01 NOTE — TELEPHONE ENCOUNTER
Pt wanted to know if you had found out anything about a rheumatologist at [facility]?

## 2019-07-01 NOTE — TELEPHONE ENCOUNTER
Not yet. 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

## 2019-07-02 ENCOUNTER — TELEPHONE (OUTPATIENT)
Dept: RHEUMATOLOGY | Age: 49
End: 2019-07-02

## 2019-07-02 NOTE — TELEPHONE ENCOUNTER
Dr. Reji Nieto office called and stated he would like to talk to you in regards to this pt. I left message with the staff you don't take outside referral's here and they left message that she doesn't have insurance and wanted to know if you would see her.   I'm sending this message to you to notify you

## 2019-07-02 NOTE — TELEPHONE ENCOUNTER
Can you check with Dr. Hoover Gave office and see if he would see her - either in his office or at [facility] clinic? Convinced she has a rheumatology disease, maybe lupus, but unable to find any supportive lab work.

## 2019-07-12 ENCOUNTER — TELEPHONE (OUTPATIENT)
Dept: FAMILY MEDICINE CLINIC | Age: 49
End: 2019-07-12

## 2019-07-12 ENCOUNTER — OFFICE VISIT (OUTPATIENT)
Dept: FAMILY MEDICINE CLINIC | Age: 49
End: 2019-07-12

## 2019-07-12 VITALS
HEART RATE: 100 BPM | BODY MASS INDEX: 25.83 KG/M2 | DIASTOLIC BLOOD PRESSURE: 74 MMHG | HEIGHT: 65 IN | WEIGHT: 155 LBS | TEMPERATURE: 97.1 F | SYSTOLIC BLOOD PRESSURE: 122 MMHG | OXYGEN SATURATION: 97 %

## 2019-07-12 DIAGNOSIS — H57.13 PAIN OF BOTH EYES: Primary | ICD-10-CM

## 2019-07-12 PROCEDURE — 99213 OFFICE O/P EST LOW 20 MIN: CPT | Performed by: INTERNAL MEDICINE

## 2019-07-12 RX ORDER — CHLORAL HYDRATE 500 MG
3000 CAPSULE ORAL DAILY
COMMUNITY

## 2019-07-12 RX ORDER — PREDNISONE 1 MG/1
5 TABLET ORAL 4 TIMES DAILY
Refills: 0 | COMMUNITY
Start: 2019-07-09 | End: 2019-09-13

## 2019-07-12 ASSESSMENT — ENCOUNTER SYMPTOMS
EYE ITCHING: 0
EYE PAIN: 1
EYE DISCHARGE: 0
PHOTOPHOBIA: 1
EYE REDNESS: 1

## 2019-08-14 ENCOUNTER — TELEPHONE (OUTPATIENT)
Dept: FAMILY MEDICINE CLINIC | Age: 49
End: 2019-08-14

## 2019-08-26 ENCOUNTER — PATIENT MESSAGE (OUTPATIENT)
Dept: FAMILY MEDICINE CLINIC | Age: 49
End: 2019-08-26

## 2019-08-26 DIAGNOSIS — H04.129 DRY EYE: Primary | ICD-10-CM

## 2019-09-06 ENCOUNTER — HOSPITAL ENCOUNTER (OUTPATIENT)
Age: 49
Discharge: HOME | End: 2019-09-08
Payer: MEDICAID

## 2019-09-06 DIAGNOSIS — H04.129 DRY EYE: ICD-10-CM

## 2019-09-06 PROCEDURE — 86235 NUCLEAR ANTIGEN ANTIBODY: CPT

## 2019-09-06 PROCEDURE — 36415 COLL VENOUS BLD VENIPUNCTURE: CPT

## 2019-09-09 LAB
ENA TO SSA (RO) ANTIBODY: NEGATIVE
ENA TO SSB (LA) ANTIBODY: NEGATIVE

## 2019-09-10 ENCOUNTER — TELEPHONE (OUTPATIENT)
Dept: FAMILY MEDICINE CLINIC | Age: 49
End: 2019-09-10

## 2019-09-10 NOTE — TELEPHONE ENCOUNTER
What questions does she have, I may be able to answer them without seeing her. I already responded to her Sjogrens labs but she may not have seen the results yet / gotten a phone call. If she is still suspicious of Sjogrens then a lip biopsy could potentially provide some additional information as a next step since her labs arent coming back supportive.

## 2019-09-13 RX ORDER — PREDNISONE 1 MG/1
5 TABLET ORAL 4 TIMES DAILY
Qty: 120 TABLET | Refills: 0 | Status: CANCELLED | OUTPATIENT
Start: 2019-09-13

## 2019-09-13 RX ORDER — PREDNISONE 20 MG/1
TABLET ORAL
Qty: 35 TABLET | Refills: 0 | Status: SHIPPED | OUTPATIENT
Start: 2019-09-13 | End: 2019-10-14 | Stop reason: SDUPTHER

## 2019-09-13 NOTE — TELEPHONE ENCOUNTER
Bump dose up to 30mg daily for a week then back down to 20mg daily thereafter. Sent 20mg pills to pharmacy - doesn't need to take QID, once daily is fine.

## 2019-09-27 ENCOUNTER — TELEPHONE (OUTPATIENT)
Dept: FAMILY MEDICINE CLINIC | Age: 49
End: 2019-09-27

## 2019-09-27 DIAGNOSIS — M33.90 HELIOTROPE EYELID RASH (HCC): ICD-10-CM

## 2019-09-27 DIAGNOSIS — H04.129 DRY EYE: ICD-10-CM

## 2019-09-27 DIAGNOSIS — R68.2 DRY MOUTH: Primary | ICD-10-CM

## 2019-09-27 NOTE — TELEPHONE ENCOUNTER
Order placed to ENT for possible lip biopsy to see if any signs of sjogrens or get an opinion on what else could be causing symptoms.

## 2019-10-04 ENCOUNTER — TELEPHONE (OUTPATIENT)
Dept: ADMINISTRATIVE | Age: 49
End: 2019-10-04

## 2019-10-14 RX ORDER — PREDNISONE 20 MG/1
20 TABLET ORAL DAILY
Qty: 30 TABLET | Refills: 1 | Status: SHIPPED | OUTPATIENT
Start: 2019-10-14 | End: 2019-11-19 | Stop reason: SDUPTHER

## 2019-11-14 RX ORDER — PREDNISONE 1 MG/1
5 TABLET ORAL DAILY
Qty: 30 TABLET | Refills: 0 | Status: SHIPPED | OUTPATIENT
Start: 2019-11-14 | End: 2019-11-19

## 2019-11-15 RX ORDER — CYCLOBENZAPRINE HCL 10 MG
10 TABLET ORAL NIGHTLY PRN
Qty: 30 TABLET | Refills: 5 | Status: SHIPPED
Start: 2019-11-15 | End: 2020-05-12 | Stop reason: SDUPTHER

## 2019-11-18 ENCOUNTER — PATIENT MESSAGE (OUTPATIENT)
Dept: FAMILY MEDICINE CLINIC | Age: 49
End: 2019-11-18

## 2019-11-18 ENCOUNTER — HOSPITAL ENCOUNTER (OUTPATIENT)
Age: 49
Discharge: HOME | End: 2019-11-20
Payer: MEDICAID

## 2019-11-18 LAB
C3 COMPLEMENT: 130 MG/DL (ref 90–180)
C4 COMPLEMENT: 22 MG/DL (ref 10–40)
RHEUMATOID FACTOR: 11 IU/ML (ref 0–13)

## 2019-11-18 PROCEDURE — 86431 RHEUMATOID FACTOR QUANT: CPT

## 2019-11-18 PROCEDURE — 84165 PROTEIN E-PHORESIS SERUM: CPT

## 2019-11-18 PROCEDURE — 83516 IMMUNOASSAY NONANTIBODY: CPT

## 2019-11-18 PROCEDURE — 36415 COLL VENOUS BLD VENIPUNCTURE: CPT

## 2019-11-18 PROCEDURE — 86803 HEPATITIS C AB TEST: CPT

## 2019-11-18 PROCEDURE — 86160 COMPLEMENT ANTIGEN: CPT

## 2019-11-19 LAB — HEPATITIS C ANTIBODY INTERPRETATION: NORMAL

## 2019-11-19 RX ORDER — PREDNISONE 20 MG/1
20 TABLET ORAL DAILY
Qty: 14 TABLET | Refills: 0 | Status: SHIPPED
Start: 2019-11-19 | End: 2020-05-28 | Stop reason: DRUGHIGH

## 2019-11-21 LAB
ALBUMIN SERPL-MCNC: 3.5 G/DL (ref 3.5–4.7)
ALPHA-1-GLOBULIN: 0.3 G/DL (ref 0.2–0.4)
ALPHA-2-GLOBULIN: 1 G/FL (ref 0.5–1)
BETA GLOBULIN: 1.1 G/DL (ref 0.8–1.3)
ELECTROPHORESIS: NORMAL
GAMMA GLOBULIN: 0.7 G/DL (ref 0.7–1.6)
HISTONE ANTIBODY IGG: 0.2 UNITS (ref 0–0.9)
TOTAL PROTEIN: 6.5 G/DL (ref 6.4–8.3)

## 2019-11-24 DIAGNOSIS — F41.9 ANXIETY: ICD-10-CM

## 2019-11-24 DIAGNOSIS — I10 ESSENTIAL HYPERTENSION: ICD-10-CM

## 2019-11-25 DIAGNOSIS — I10 ESSENTIAL HYPERTENSION: ICD-10-CM

## 2019-11-25 DIAGNOSIS — F41.9 ANXIETY: ICD-10-CM

## 2019-11-25 RX ORDER — FLUOXETINE HYDROCHLORIDE 20 MG/1
CAPSULE ORAL
Qty: 90 CAPSULE | Refills: 5 | OUTPATIENT
Start: 2019-11-25

## 2019-11-25 RX ORDER — AMLODIPINE BESYLATE AND BENAZEPRIL HYDROCHLORIDE 5; 10 MG/1; MG/1
CAPSULE ORAL
Qty: 30 CAPSULE | Refills: 5 | Status: SHIPPED | OUTPATIENT
Start: 2019-11-25 | End: 2020-01-27

## 2019-11-25 RX ORDER — FLUOXETINE HYDROCHLORIDE 20 MG/1
CAPSULE ORAL
Qty: 90 CAPSULE | Refills: 5 | Status: SHIPPED
Start: 2019-11-25 | End: 2020-05-12 | Stop reason: SDUPTHER

## 2019-11-25 RX ORDER — AMLODIPINE BESYLATE AND BENAZEPRIL HYDROCHLORIDE 5; 10 MG/1; MG/1
CAPSULE ORAL
Qty: 30 CAPSULE | Refills: 5 | OUTPATIENT
Start: 2019-11-25

## 2019-12-05 ENCOUNTER — HOSPITAL ENCOUNTER (OUTPATIENT)
Dept: GENERAL RADIOLOGY | Age: 49
Discharge: HOME | End: 2019-12-07
Payer: MEDICAID

## 2019-12-05 ENCOUNTER — HOSPITAL ENCOUNTER (OUTPATIENT)
Age: 49
Discharge: HOME | End: 2019-12-07
Payer: MEDICAID

## 2019-12-05 DIAGNOSIS — R06.02 SHORTNESS OF BREATH: ICD-10-CM

## 2019-12-05 PROCEDURE — 71046 X-RAY EXAM CHEST 2 VIEWS: CPT

## 2019-12-12 ENCOUNTER — TELEPHONE (OUTPATIENT)
Dept: FAMILY MEDICINE CLINIC | Age: 49
End: 2019-12-12

## 2020-01-21 ENCOUNTER — OFFICE VISIT (OUTPATIENT)
Dept: ENT CLINIC | Age: 50
End: 2020-01-21
Payer: MEDICAID

## 2020-01-21 ENCOUNTER — TELEPHONE (OUTPATIENT)
Dept: ENT CLINIC | Age: 50
End: 2020-01-21

## 2020-01-21 ENCOUNTER — TELEPHONE (OUTPATIENT)
Dept: FAMILY MEDICINE CLINIC | Age: 50
End: 2020-01-21

## 2020-01-21 ENCOUNTER — HOSPITAL ENCOUNTER (OUTPATIENT)
Age: 50
Discharge: HOME | End: 2020-01-23
Payer: MEDICAID

## 2020-01-21 VITALS
DIASTOLIC BLOOD PRESSURE: 98 MMHG | BODY MASS INDEX: 25.83 KG/M2 | HEIGHT: 65 IN | SYSTOLIC BLOOD PRESSURE: 150 MMHG | WEIGHT: 155 LBS

## 2020-01-21 LAB
T4 FREE: 1.24 NG/DL (ref 0.93–1.7)
TSH SERPL DL<=0.05 MIU/L-ACNC: 1.25 UIU/ML (ref 0.27–4.2)

## 2020-01-21 PROCEDURE — 84439 ASSAY OF FREE THYROXINE: CPT

## 2020-01-21 PROCEDURE — 4004F PT TOBACCO SCREEN RCVD TLK: CPT | Performed by: OTOLARYNGOLOGY

## 2020-01-21 PROCEDURE — 84443 ASSAY THYROID STIM HORMONE: CPT

## 2020-01-21 PROCEDURE — G8427 DOCREV CUR MEDS BY ELIG CLIN: HCPCS | Performed by: OTOLARYNGOLOGY

## 2020-01-21 PROCEDURE — G8419 CALC BMI OUT NRM PARAM NOF/U: HCPCS | Performed by: OTOLARYNGOLOGY

## 2020-01-21 PROCEDURE — 36415 COLL VENOUS BLD VENIPUNCTURE: CPT

## 2020-01-21 PROCEDURE — 99204 OFFICE O/P NEW MOD 45 MIN: CPT | Performed by: OTOLARYNGOLOGY

## 2020-01-21 PROCEDURE — G8484 FLU IMMUNIZE NO ADMIN: HCPCS | Performed by: OTOLARYNGOLOGY

## 2020-01-21 RX ORDER — HYDROXYCHLOROQUINE SULFATE 200 MG/1
400 TABLET, FILM COATED ORAL DAILY
COMMUNITY

## 2020-01-21 RX ORDER — BENAZEPRIL HYDROCHLORIDE 10 MG/1
10 TABLET ORAL DAILY
COMMUNITY
End: 2020-03-04

## 2020-01-21 SDOH — HEALTH STABILITY: MENTAL HEALTH: HOW OFTEN DO YOU HAVE A DRINK CONTAINING ALCOHOL?: 2-3 TIMES A WEEK

## 2020-01-21 SDOH — HEALTH STABILITY: MENTAL HEALTH: HOW MANY STANDARD DRINKS CONTAINING ALCOHOL DO YOU HAVE ON A TYPICAL DAY?: 3 OR 4

## 2020-01-21 ASSESSMENT — ENCOUNTER SYMPTOMS: EYES NEGATIVE: 1

## 2020-01-21 NOTE — PROGRESS NOTES
by mouth daily ), Disp: 14 tablet, Rfl: 0    cyclobenzaprine (FLEXERIL) 10 MG tablet, Take 1 tablet by mouth nightly as needed for Muscle spasms, Disp: 30 tablet, Rfl: 5    Omega-3 Fatty Acids (FISH OIL) 1000 MG CAPS, Take 3,000 mg by mouth 3 times daily, Disp: , Rfl:     vitamin A 43334 units capsule, Take 10,000 Units by mouth daily, Disp: , Rfl:     hypromellose (ARTIFICIAL TEARS) 0.4 % SOLN ophthalmic solution, Place 1 drop into both eyes every 4 hours as needed (dry eyes), Disp: 15 mL, Rfl: 0    Tens Unit MISC, by Does not apply route, Disp: 1 each, Rfl: 0    vitamin D (CHOLECALCIFEROL) 1000 UNIT TABS tablet, Take 1,000 Units by mouth every morning , Disp: , Rfl:     Multiple Vitamins-Minerals (HAIR/SKIN/NAILS PO), Take 1 tablet by mouth every morning , Disp: , Rfl:     B Complex Vitamins (VITAMIN B COMPLEX PO), Take 1 tablet by mouth every morning , Disp: , Rfl:     CALCIUM-VITAMIN D PO, Take 1 tablet by mouth every morning , Disp: , Rfl:     amLODIPine-benazepril (LOTREL) 5-10 MG per capsule, take 1 capsule by mouth once daily for blood pressure. (Patient not taking: Reported on 1/21/2020), Disp: 30 capsule, Rfl: 5  Lidocaine; Penicillins; Bactrim [sulfamethoxazole-trimethoprim]; Levaquin [levofloxacin]; and Macrobid [nitrofurantoin]  Social History     Tobacco Use    Smoking status: Current Every Day Smoker     Packs/day: 0.50     Years: 30.00     Pack years: 15.00     Types: Cigarettes    Smokeless tobacco: Never Used   Substance Use Topics    Alcohol use: Yes     Frequency: 2-3 times a week     Drinks per session: 3 or 4     Comment: occasional    Drug use: No     Family History   Problem Relation Age of Onset    Heart Disease Father         CHF           Objective:   BP (!) 150/98 (Site: Right Upper Arm, Position: Sitting)   Ht 5' 5\" (1.651 m)   Wt 155 lb (70.3 kg)   BMI 25.79 kg/m²     Physical Exam  Constitutional:       Appearance: Normal appearance. HENT:      Head: Normocephalic.

## 2020-01-22 NOTE — TELEPHONE ENCOUNTER
Pt advised and will begin taking 20 mg of her benazepril today. Pt states she will be seen tomorrow by ENT at [facility] office and again for a follow up (likely) next Tuesday the 28th in the [place] office. BP will be checked at both visits. Nurse visit BP check was offered but pt would rather have it checked at appts she already has scheduled for the sake of not having to take more time off of work.

## 2020-01-23 ENCOUNTER — HOSPITAL ENCOUNTER (OUTPATIENT)
Age: 50
Discharge: HOME | End: 2020-01-25
Payer: MEDICAID

## 2020-01-23 ENCOUNTER — TELEPHONE (OUTPATIENT)
Dept: ENT CLINIC | Age: 50
End: 2020-01-23

## 2020-01-23 ENCOUNTER — TELEPHONE (OUTPATIENT)
Dept: FAMILY MEDICINE CLINIC | Age: 50
End: 2020-01-23

## 2020-01-23 ENCOUNTER — PROCEDURE VISIT (OUTPATIENT)
Dept: ENT CLINIC | Age: 50
End: 2020-01-23
Payer: MEDICAID

## 2020-01-23 VITALS
HEIGHT: 65 IN | HEART RATE: 106 BPM | DIASTOLIC BLOOD PRESSURE: 103 MMHG | BODY MASS INDEX: 25.83 KG/M2 | SYSTOLIC BLOOD PRESSURE: 158 MMHG | WEIGHT: 155 LBS

## 2020-01-23 PROCEDURE — 88305 TISSUE EXAM BY PATHOLOGIST: CPT

## 2020-01-23 PROCEDURE — 40490 BIOPSY OF LIP: CPT | Performed by: OTOLARYNGOLOGY

## 2020-01-27 RX ORDER — BENAZEPRIL HYDROCHLORIDE AND HYDROCHLOROTHIAZIDE 20; 12.5 MG/1; MG/1
1 TABLET ORAL DAILY
Qty: 30 TABLET | Refills: 3 | Status: SHIPPED
Start: 2020-01-27 | End: 2020-03-04

## 2020-02-04 ENCOUNTER — OFFICE VISIT (OUTPATIENT)
Dept: ENT CLINIC | Age: 50
End: 2020-02-04
Payer: MEDICAID

## 2020-02-04 ENCOUNTER — TELEPHONE (OUTPATIENT)
Dept: ENT CLINIC | Age: 50
End: 2020-02-04

## 2020-02-04 VITALS
SYSTOLIC BLOOD PRESSURE: 144 MMHG | HEART RATE: 88 BPM | WEIGHT: 150 LBS | BODY MASS INDEX: 24.99 KG/M2 | DIASTOLIC BLOOD PRESSURE: 90 MMHG | OXYGEN SATURATION: 98 % | HEIGHT: 65 IN

## 2020-02-04 PROCEDURE — G8420 CALC BMI NORM PARAMETERS: HCPCS | Performed by: OTOLARYNGOLOGY

## 2020-02-04 PROCEDURE — 99213 OFFICE O/P EST LOW 20 MIN: CPT | Performed by: OTOLARYNGOLOGY

## 2020-02-04 PROCEDURE — G8484 FLU IMMUNIZE NO ADMIN: HCPCS | Performed by: OTOLARYNGOLOGY

## 2020-02-04 PROCEDURE — 4004F PT TOBACCO SCREEN RCVD TLK: CPT | Performed by: OTOLARYNGOLOGY

## 2020-02-04 PROCEDURE — G8427 DOCREV CUR MEDS BY ELIG CLIN: HCPCS | Performed by: OTOLARYNGOLOGY

## 2020-02-04 ASSESSMENT — ENCOUNTER SYMPTOMS
RESPIRATORY NEGATIVE: 1
EYES NEGATIVE: 1
COLOR CHANGE: 0
GASTROINTESTINAL NEGATIVE: 1
ABDOMINAL PAIN: 0
SHORTNESS OF BREATH: 0

## 2020-02-04 NOTE — PROGRESS NOTES
Subjective:      Patient ID:  Isela Frances is a 51 y.o. female. HPI:    Pt returns for review of lip biopsy. There are not changes since last visit. Patient's medications, allergies, past medical, surgical, social and family histories were reviewed and updated as appropriate. Review of Systems   Constitutional: Negative. HENT: Positive for congestion. Eyes: Negative. Negative for visual disturbance. Respiratory: Negative. Negative for shortness of breath. Cardiovascular: Negative. Negative for chest pain. Gastrointestinal: Negative. Negative for abdominal pain. Endocrine: Negative. Genitourinary: Negative. Musculoskeletal: Negative. Skin: Negative. Negative for color change. Allergic/Immunologic: Positive for immunocompromised state. Neurological: Negative. Hematological: Negative. Psychiatric/Behavioral: Negative. Negative for behavioral problems and hallucinations. All other systems reviewed and are negative. Objective:     Vitals:    02/04/20 1540   BP: (!) 144/90   Pulse:    SpO2:      Physical Exam  Constitutional:       Appearance: Normal appearance. HENT:      Head: Normocephalic. Right Ear: Tympanic membrane, ear canal and external ear normal.      Left Ear: Tympanic membrane, ear canal and external ear normal.      Nose: Mucosal edema and congestion present. Mouth/Throat:      Lips: Pink. No lesions. Mouth: Mucous membranes are dry. Dentition: Normal dentition. Tongue: No lesions. Palate: No mass. Pharynx: Oropharynx is clear. Uvula midline. Comments: Lip biopsy site healing well. Eyes:      Pupils: Pupils are equal, round, and reactive to light. Neck:      Musculoskeletal: Normal range of motion. Cardiovascular:      Rate and Rhythm: Normal rate. Skin:     Capillary Refill: Capillary refill takes less than 2 seconds.    Neurological:      Mental Status: She is alert and oriented to person,

## 2020-02-06 ENCOUNTER — HOSPITAL ENCOUNTER (OUTPATIENT)
Age: 50
Discharge: HOME | End: 2020-02-08
Payer: MEDICAID

## 2020-02-06 LAB
ALBUMIN SERPL-MCNC: 4.5 G/DL (ref 3.5–5.2)
ALP BLD-CCNC: 68 U/L (ref 35–104)
ALT SERPL-CCNC: 18 U/L (ref 0–32)
ANION GAP SERPL CALCULATED.3IONS-SCNC: 15 MMOL/L (ref 7–16)
AST SERPL-CCNC: 25 U/L (ref 0–31)
BASOPHILS ABSOLUTE: 0.06 E9/L (ref 0–0.2)
BASOPHILS RELATIVE PERCENT: 0.5 % (ref 0–2)
BILIRUB SERPL-MCNC: <0.2 MG/DL (ref 0–1.2)
BUN BLDV-MCNC: 6 MG/DL (ref 6–20)
C-REACTIVE PROTEIN: 1.1 MG/DL (ref 0–0.4)
CALCIUM SERPL-MCNC: 9.7 MG/DL (ref 8.6–10.2)
CHLORIDE BLD-SCNC: 97 MMOL/L (ref 98–107)
CO2: 26 MMOL/L (ref 22–29)
CREAT SERPL-MCNC: 0.7 MG/DL (ref 0.5–1)
EOSINOPHILS ABSOLUTE: 0.17 E9/L (ref 0.05–0.5)
EOSINOPHILS RELATIVE PERCENT: 1.5 % (ref 0–6)
GFR AFRICAN AMERICAN: >60
GFR NON-AFRICAN AMERICAN: >60 ML/MIN/1.73
GLUCOSE BLD-MCNC: 97 MG/DL (ref 74–99)
HCT VFR BLD CALC: 40.5 % (ref 34–48)
HEMOGLOBIN: 13.3 G/DL (ref 11.5–15.5)
IMMATURE GRANULOCYTES #: 0.03 E9/L
IMMATURE GRANULOCYTES %: 0.3 % (ref 0–5)
LYMPHOCYTES ABSOLUTE: 2.77 E9/L (ref 1.5–4)
LYMPHOCYTES RELATIVE PERCENT: 24.6 % (ref 20–42)
MCH RBC QN AUTO: 31.2 PG (ref 26–35)
MCHC RBC AUTO-ENTMCNC: 32.8 % (ref 32–34.5)
MCV RBC AUTO: 95.1 FL (ref 80–99.9)
MONOCYTES ABSOLUTE: 0.71 E9/L (ref 0.1–0.95)
MONOCYTES RELATIVE PERCENT: 6.3 % (ref 2–12)
NEUTROPHILS ABSOLUTE: 7.52 E9/L (ref 1.8–7.3)
NEUTROPHILS RELATIVE PERCENT: 66.8 % (ref 43–80)
PDW BLD-RTO: 13.3 FL (ref 11.5–15)
PLATELET # BLD: 327 E9/L (ref 130–450)
PMV BLD AUTO: 10 FL (ref 7–12)
POTASSIUM SERPL-SCNC: 3.9 MMOL/L (ref 3.5–5)
RBC # BLD: 4.26 E12/L (ref 3.5–5.5)
RHEUMATOID FACTOR: 11 IU/ML (ref 0–13)
SODIUM BLD-SCNC: 138 MMOL/L (ref 132–146)
TOTAL PROTEIN: 7.4 G/DL (ref 6.4–8.3)
WBC # BLD: 11.3 E9/L (ref 4.5–11.5)

## 2020-02-06 PROCEDURE — 85025 COMPLETE CBC W/AUTO DIFF WBC: CPT

## 2020-02-06 PROCEDURE — 86140 C-REACTIVE PROTEIN: CPT

## 2020-02-06 PROCEDURE — 86431 RHEUMATOID FACTOR QUANT: CPT

## 2020-02-06 PROCEDURE — 80053 COMPREHEN METABOLIC PANEL: CPT

## 2020-03-04 ENCOUNTER — OFFICE VISIT (OUTPATIENT)
Dept: FAMILY MEDICINE CLINIC | Age: 50
End: 2020-03-04
Payer: MEDICAID

## 2020-03-04 VITALS
TEMPERATURE: 98.2 F | OXYGEN SATURATION: 99 % | SYSTOLIC BLOOD PRESSURE: 138 MMHG | WEIGHT: 161 LBS | BODY MASS INDEX: 26.82 KG/M2 | DIASTOLIC BLOOD PRESSURE: 78 MMHG | HEART RATE: 117 BPM | HEIGHT: 65 IN

## 2020-03-04 PROCEDURE — G8484 FLU IMMUNIZE NO ADMIN: HCPCS | Performed by: FAMILY MEDICINE

## 2020-03-04 PROCEDURE — G8427 DOCREV CUR MEDS BY ELIG CLIN: HCPCS | Performed by: FAMILY MEDICINE

## 2020-03-04 PROCEDURE — G8419 CALC BMI OUT NRM PARAM NOF/U: HCPCS | Performed by: FAMILY MEDICINE

## 2020-03-04 PROCEDURE — 99213 OFFICE O/P EST LOW 20 MIN: CPT | Performed by: FAMILY MEDICINE

## 2020-03-04 PROCEDURE — 4004F PT TOBACCO SCREEN RCVD TLK: CPT | Performed by: FAMILY MEDICINE

## 2020-03-04 RX ORDER — AMLODIPINE BESYLATE AND BENAZEPRIL HYDROCHLORIDE 5; 10 MG/1; MG/1
CAPSULE ORAL
Qty: 30 CAPSULE | Refills: 5 | Status: SHIPPED
Start: 2020-03-04 | End: 2020-08-20

## 2020-03-04 NOTE — PROGRESS NOTES
removal       Social History     Tobacco Use    Smoking status: Current Every Day Smoker     Packs/day: 0.50     Years: 30.00     Pack years: 15.00     Types: Cigarettes    Smokeless tobacco: Never Used   Substance Use Topics    Alcohol use: Yes     Frequency: 2-3 times a week     Drinks per session: 3 or 4     Comment: occasional    Drug use: No       ROS:  No CP, No palpitations,   No sob, No cough,   No abd pain, No heartburn,   No headaches, positive mild dizziness  No tingling, No numbness, No weakness,   No bowel changes, No hematochezia, No melena,  No bladder changes, No hematuria  No skin rashes, No skin lesions. No vision changes, No hearing changes,   No polyuria, polydipsia, polyphagia. Stable mood. ROS otherwise negative unless as listed in HPI. Chart reviewed and updated where appropriate for PMH, Fam, and Soc Hx. Physical Exam   /78 (Site: Left Upper Arm, Position: Sitting, Cuff Size: Large Adult)   Pulse 117   Temp 98.2 °F (36.8 °C) (Temporal)   Ht 5' 5\" (1.651 m)   Wt 161 lb (73 kg)   SpO2 99%   BMI 26.79 kg/m²   Wt Readings from Last 3 Encounters:   03/04/20 161 lb (73 kg)   02/04/20 150 lb (68 kg)   01/23/20 155 lb (70.3 kg)       Constitutional:    She is oriented to person, place, and time. She appears well-developed and well-nourished. HENT:    Nose: Nose normal.    Mouth/Throat: Oropharynx is clear and moist.   Eyes:    Conjunctivae are normal.    Pupils are equal, round, and reactive to light. EOMI. Neck:    Normal range of motion. No thyromegaly or nodules noted. No bruit. Cardiovascular:    Normal rate, regular rhythm and normal heart sounds. 2 out of 6 systolic right upper sternal murmur. No gallop and no friction rub. Pulmonary/Chest:    Effort normal and breath sounds normal.    No wheezes. No rales or rhonchi. Abdominal:    Soft. Bowel sounds are normal.    No distension. No tenderness. Musculoskeletal:    Normal range of motion.      No

## 2020-03-31 ENCOUNTER — TELEPHONE (OUTPATIENT)
Dept: FAMILY MEDICINE CLINIC | Age: 50
End: 2020-03-31

## 2020-03-31 NOTE — TELEPHONE ENCOUNTER
Pt called back stating her bp readings are just all over the place. I asked for recent readings but she just said the numbers are all different. When would you like pt to be scheduled and at what location? Please advise.

## 2020-03-31 NOTE — TELEPHONE ENCOUNTER
Pt reports she is unable to regulate bp and has anxiety, employer laid her off. She requests office contact her at [phone].

## 2020-03-31 NOTE — TELEPHONE ENCOUNTER
We can try a video visit next week (or on Friday this week) but I have to learn how to do that. Patient will also need counseled. Yasmeen Merritt been quarantined for the past week so am out of the loop on how offices are currently handling this.

## 2020-04-01 ENCOUNTER — TELEPHONE (OUTPATIENT)
Dept: FAMILY MEDICINE CLINIC | Age: 50
End: 2020-04-01

## 2020-04-03 ENCOUNTER — VIRTUAL VISIT (OUTPATIENT)
Dept: PRIMARY CARE CLINIC | Age: 50
End: 2020-04-03
Payer: MEDICAID

## 2020-04-03 VITALS
SYSTOLIC BLOOD PRESSURE: 137 MMHG | BODY MASS INDEX: 26.82 KG/M2 | WEIGHT: 161 LBS | DIASTOLIC BLOOD PRESSURE: 89 MMHG | HEART RATE: 99 BPM | HEIGHT: 65 IN

## 2020-04-03 PROCEDURE — G8419 CALC BMI OUT NRM PARAM NOF/U: HCPCS | Performed by: FAMILY MEDICINE

## 2020-04-03 PROCEDURE — 99214 OFFICE O/P EST MOD 30 MIN: CPT | Performed by: FAMILY MEDICINE

## 2020-04-03 PROCEDURE — G8427 DOCREV CUR MEDS BY ELIG CLIN: HCPCS | Performed by: FAMILY MEDICINE

## 2020-04-03 PROCEDURE — 4004F PT TOBACCO SCREEN RCVD TLK: CPT | Performed by: FAMILY MEDICINE

## 2020-04-03 RX ORDER — BUSPIRONE HYDROCHLORIDE 7.5 MG/1
7.5 TABLET ORAL 2 TIMES DAILY
Qty: 60 TABLET | Refills: 0 | Status: SHIPPED
Start: 2020-04-03 | End: 2020-05-02 | Stop reason: SDUPTHER

## 2020-04-03 RX ORDER — HYDROXYZINE HYDROCHLORIDE 25 MG/1
25 TABLET, FILM COATED ORAL NIGHTLY PRN
Qty: 30 TABLET | Refills: 0 | Status: SHIPPED | OUTPATIENT
Start: 2020-04-03 | End: 2020-05-03

## 2020-04-03 NOTE — PROGRESS NOTES
hematuria  No skin rashes, No skin lesions. No vision changes, No hearing changes,   No polyuria, polydipsia, polyphagia. Anxious mood. ROS otherwise negative unless as listed in HPI. Chart reviewed and updated where appropriate for PMH, Fam, and Soc Hx. Physical Exam   /89 (Site: Left Lower Arm)   Pulse 99   Ht 5' 5\" (1.651 m)   Wt 161 lb (73 kg)   BMI 26.79 kg/m²   Wt Readings from Last 3 Encounters:   04/03/20 161 lb (73 kg)   03/04/20 161 lb (73 kg)   02/04/20 150 lb (68 kg)       No PE performed ue to video visit. Patient was awake, active, alert. In no distress. Normal resp rate. Good insight and judgement. She notes increased anxiety and had a normal affect. Thought content was appropriate. No SI or HI. Current Outpatient Medications on File Prior to Visit   Medication Sig Dispense Refill    Multiple Vitamins-Minerals (CENTRA-JACOBY PO) Take by mouth daily      Loratadine (CLARITIN PO) Take by mouth as needed      amLODIPine-benazepril (LOTREL) 5-10 MG per capsule take 1 capsule by mouth once daily for blood pressure. 30 capsule 5    Lifitegrast (XIIDRA) 5 % SOLN Apply to eye daily      methotrexate (RHEUMATREX) 2.5 MG chemo tablet Take 2.5 mg by mouth once a week 4 pills weekly      Flaxseed, Linseed, (BL FLAX SEED OIL PO) Take by mouth daily      hydroxychloroquine (PLAQUENIL) 200 MG tablet Take 200 mg by mouth 2 times daily       FLUoxetine (PROZAC) 20 MG capsule Take 1 capsule by mouth every morning and 2 capsules every evening.  90 capsule 5    predniSONE (DELTASONE) 20 MG tablet Take 1 tablet by mouth daily (Patient taking differently: Take 5 mg by mouth daily ) 14 tablet 0    cyclobenzaprine (FLEXERIL) 10 MG tablet Take 1 tablet by mouth nightly as needed for Muscle spasms 30 tablet 5    Omega-3 Fatty Acids (FISH OIL) 1000 MG CAPS Take 3,000 mg by mouth 3 times daily      hypromellose (ARTIFICIAL TEARS) 0.4 % SOLN ophthalmic solution Place 1 drop into both eyes every 4

## 2020-04-30 ENCOUNTER — HOSPITAL ENCOUNTER (OUTPATIENT)
Age: 50
Discharge: HOME | End: 2020-05-02
Payer: MEDICAID

## 2020-04-30 LAB
ALBUMIN SERPL-MCNC: 4.5 G/DL (ref 3.5–5.2)
ALP BLD-CCNC: 68 U/L (ref 35–104)
ALT SERPL-CCNC: 19 U/L (ref 0–32)
ANION GAP SERPL CALCULATED.3IONS-SCNC: 11 MMOL/L (ref 7–16)
AST SERPL-CCNC: 27 U/L (ref 0–31)
BILIRUB SERPL-MCNC: 0.5 MG/DL (ref 0–1.2)
BUN BLDV-MCNC: 7 MG/DL (ref 6–20)
C-REACTIVE PROTEIN: 0.5 MG/DL (ref 0–0.4)
CALCIUM SERPL-MCNC: 9.9 MG/DL (ref 8.6–10.2)
CHLORIDE BLD-SCNC: 101 MMOL/L (ref 98–107)
CO2: 27 MMOL/L (ref 22–29)
CREAT SERPL-MCNC: 0.7 MG/DL (ref 0.5–1)
GFR AFRICAN AMERICAN: >60
GFR NON-AFRICAN AMERICAN: >60 ML/MIN/1.73
GLUCOSE BLD-MCNC: 78 MG/DL (ref 74–99)
POTASSIUM SERPL-SCNC: 4.1 MMOL/L (ref 3.5–5)
SODIUM BLD-SCNC: 139 MMOL/L (ref 132–146)
TOTAL PROTEIN: 7.1 G/DL (ref 6.4–8.3)

## 2020-04-30 PROCEDURE — 36415 COLL VENOUS BLD VENIPUNCTURE: CPT

## 2020-04-30 PROCEDURE — 85651 RBC SED RATE NONAUTOMATED: CPT

## 2020-04-30 PROCEDURE — 82164 ANGIOTENSIN I ENZYME TEST: CPT

## 2020-04-30 PROCEDURE — 82306 VITAMIN D 25 HYDROXY: CPT

## 2020-04-30 PROCEDURE — 80053 COMPREHEN METABOLIC PANEL: CPT

## 2020-04-30 PROCEDURE — 86140 C-REACTIVE PROTEIN: CPT

## 2020-04-30 PROCEDURE — 86812 HLA TYPING A B OR C: CPT

## 2020-05-01 LAB
SEDIMENTATION RATE, ERYTHROCYTE: 2 MM/HR (ref 0–20)
VITAMIN D 25-HYDROXY: 58 NG/ML (ref 30–100)

## 2020-05-02 LAB — ANGIOTENSIN CONVERTING ENZYME: <5 U/L (ref 9–67)

## 2020-05-03 LAB — HLA B27: NEGATIVE

## 2020-05-12 RX ORDER — CYCLOBENZAPRINE HCL 10 MG
10 TABLET ORAL NIGHTLY PRN
Qty: 30 TABLET | Refills: 5 | Status: SHIPPED
Start: 2020-05-12 | End: 2020-11-11 | Stop reason: SDUPTHER

## 2020-05-12 RX ORDER — FLUOXETINE HYDROCHLORIDE 20 MG/1
CAPSULE ORAL
Qty: 90 CAPSULE | Refills: 5 | Status: SHIPPED
Start: 2020-05-12 | End: 2020-11-11 | Stop reason: SDUPTHER

## 2020-05-12 NOTE — TELEPHONE ENCOUNTER
Last Appointment:  3/4/2020  Future Appointments   Date Time Provider Yanet Quiroz   9/17/2020  8:20 AM Cleve Rice  [address]

## 2020-05-28 ENCOUNTER — OFFICE VISIT (OUTPATIENT)
Dept: FAMILY MEDICINE CLINIC | Age: 50
End: 2020-05-28
Payer: MEDICAID

## 2020-05-28 VITALS
OXYGEN SATURATION: 97 % | TEMPERATURE: 98.6 F | HEART RATE: 100 BPM | BODY MASS INDEX: 26.66 KG/M2 | SYSTOLIC BLOOD PRESSURE: 132 MMHG | HEIGHT: 65 IN | DIASTOLIC BLOOD PRESSURE: 78 MMHG | WEIGHT: 160 LBS

## 2020-05-28 PROCEDURE — 4004F PT TOBACCO SCREEN RCVD TLK: CPT | Performed by: FAMILY MEDICINE

## 2020-05-28 PROCEDURE — 99213 OFFICE O/P EST LOW 20 MIN: CPT | Performed by: FAMILY MEDICINE

## 2020-05-28 PROCEDURE — G8427 DOCREV CUR MEDS BY ELIG CLIN: HCPCS | Performed by: FAMILY MEDICINE

## 2020-05-28 PROCEDURE — G8419 CALC BMI OUT NRM PARAM NOF/U: HCPCS | Performed by: FAMILY MEDICINE

## 2020-05-28 RX ORDER — PREDNISONE 1 MG/1
5 TABLET ORAL DAILY
Qty: 30 TABLET | Refills: 2 | Status: SHIPPED
Start: 2020-05-28 | End: 2021-01-07

## 2020-05-28 RX ORDER — APREMILAST 30 MG/1
30 TABLET, FILM COATED ORAL DAILY
COMMUNITY
End: 2020-08-17 | Stop reason: ALTCHOICE

## 2020-05-28 NOTE — PROGRESS NOTES
(Site: Left Upper Arm, Position: Sitting, Cuff Size: Medium Adult)   Pulse 100   Temp 98.6 °F (37 °C) (Temporal)   Ht 5' 5\" (1.651 m)   Wt 160 lb (72.6 kg)   SpO2 97%   BMI 26.63 kg/m²   Wt Readings from Last 3 Encounters:   05/28/20 160 lb (72.6 kg)   04/03/20 161 lb (73 kg)   03/04/20 161 lb (73 kg)       Constitutional:    She is oriented to person, place, and time. She appears well-developed and well-nourished. Abdominal:    Soft. Bowel sounds are normal.    No distension. No tenderness. Musculoskeletal:    Normal range of motion. No joint swelling noted. No peripheral edema. Trace to +1 edema noted in hands and feet in photos she showed. Neurological:    She is alert and oriented to person, place, and time. Motor and sensation grossly intact. Normal Gait. Skin:    Skin is warm and dry. No rashes, lesions. Some redness to the skin. Small papular nonblanching lesions on the feet. Psychiatric:    She has a normal mood and anxious affect. Normal groom and dress. Current Outpatient Medications on File Prior to Visit   Medication Sig Dispense Refill    Apremilast (OTEZLA) 30 MG TABS Take 30 mg by mouth daily Sample packets from Dr. Shantell Mora cyclobenzaprine (FLEXERIL) 10 MG tablet Take 1 tablet by mouth nightly as needed for Muscle spasms 30 tablet 5    FLUoxetine (PROZAC) 20 MG capsule Take 1 capsule by mouth every morning and 2 capsules every evening. 90 capsule 5    busPIRone (BUSPAR) 7.5 MG tablet Take 1 tablet by mouth 2 times daily 60 tablet 1    Multiple Vitamins-Minerals (CENTRA-JACOBY PO) Take by mouth daily      Loratadine (CLARITIN PO) Take by mouth as needed      amLODIPine-benazepril (LOTREL) 5-10 MG per capsule take 1 capsule by mouth once daily for blood pressure.  30 capsule 5    Lifitegrast (XIIDRA) 5 % SOLN Apply to eye daily      methotrexate (RHEUMATREX) 2.5 MG chemo tablet Take 2.5 mg by mouth once a week 4 pills weekly      Flaxseed, Linseed,

## 2020-06-09 ENCOUNTER — OFFICE VISIT (OUTPATIENT)
Dept: PHYSICAL MEDICINE AND REHAB | Age: 50
End: 2020-06-09
Payer: MEDICAID

## 2020-06-09 VITALS
SYSTOLIC BLOOD PRESSURE: 132 MMHG | WEIGHT: 160 LBS | DIASTOLIC BLOOD PRESSURE: 70 MMHG | TEMPERATURE: 98.2 F | HEIGHT: 65 IN | BODY MASS INDEX: 26.66 KG/M2

## 2020-06-09 PROCEDURE — G8419 CALC BMI OUT NRM PARAM NOF/U: HCPCS | Performed by: PHYSICAL MEDICINE & REHABILITATION

## 2020-06-09 PROCEDURE — 99202 OFFICE O/P NEW SF 15 MIN: CPT | Performed by: PHYSICAL MEDICINE & REHABILITATION

## 2020-06-09 PROCEDURE — G8427 DOCREV CUR MEDS BY ELIG CLIN: HCPCS | Performed by: PHYSICAL MEDICINE & REHABILITATION

## 2020-06-09 PROCEDURE — 4004F PT TOBACCO SCREEN RCVD TLK: CPT | Performed by: PHYSICAL MEDICINE & REHABILITATION

## 2020-06-09 PROCEDURE — 95886 MUSC TEST DONE W/N TEST COMP: CPT | Performed by: PHYSICAL MEDICINE & REHABILITATION

## 2020-06-09 PROCEDURE — 95913 NRV CNDJ TEST 13/> STUDIES: CPT | Performed by: PHYSICAL MEDICINE & REHABILITATION

## 2020-06-09 NOTE — PROGRESS NOTES
Lucie Cuevas M.D. [facility]  [address] [place] [id number]  Dept: [phone]  Dept Fax: [phone]         Date of Examination: 6/9/20   Patient Name: Alistair Payne  is a 51y.o. year old female who was seen today regarding numbness/tingling/burning in her feet. Patient reports that she has had intermittent numbness/tingling in her toes and finger tips for a long time. However, over the last month or so she has had worsening symptoms in her feet, including episodes of significant burning sensation in both feet to just above the ankles. She reports red discoloration of both feet during episodes. She does have reported history of Raynaud's. No weakness reported. Past Medical History:   Diagnosis Date    Anxiety     Depression     Herniated disc     Hypertension     Pneumonia     Raynaud's disease     TMJ (dislocation of temporomandibular joint)        Past Surgical History:   Procedure Laterality Date    HYSTERECTOMY      OVARIAN CYST REMOVAL      benign mass removal       Current Outpatient Medications   Medication Sig Dispense Refill    predniSONE (DELTASONE) 5 MG tablet Take 1 tablet by mouth daily 30 tablet 2    cyclobenzaprine (FLEXERIL) 10 MG tablet Take 1 tablet by mouth nightly as needed for Muscle spasms 30 tablet 5    FLUoxetine (PROZAC) 20 MG capsule Take 1 capsule by mouth every morning and 2 capsules every evening. 90 capsule 5    Multiple Vitamins-Minerals (CENTRA-JACOBY PO) Take by mouth daily      Loratadine (CLARITIN PO) Take by mouth as needed      amLODIPine-benazepril (LOTREL) 5-10 MG per capsule take 1 capsule by mouth once daily for blood pressure.  30 capsule 5    Lifitegrast (XIIDRA) 5 % SOLN Apply to eye daily      methotrexate (RHEUMATREX) 2.5 MG chemo tablet Take 2.5 mg by mouth once a week 4 pills weekly      Flaxseed, Linseed, (BL FLAX SEED fiber neuropathy, so this could not be ruled out. The patient was educated about the diagnosis and the prognosis. · Advised patient to follow up with referring provider. Thank you for allowing me to participate in the care of your patient.         Noreene Lennox, MD  Physical Medicine and Rehabilitation

## 2020-06-09 NOTE — PROGRESS NOTES
TIBIAL (KNEE) 47.40 0       HReflex      Nerve H Lat. H Amp pp    ms mV   R TIBIAL (KNEE) - [facility] 29.11 1.0   L TIBIAL (KNEE) - [facility] 26.98 3.1       EMG Summary Table     Spontaneous MUAP Recruitment    IA Fib PSW Fasc H.F. Amp Dur. PPP Pattern   R. TIB ANTERIOR N None None None None N N N N   R. GASTROCN (MED) N None None None None N N N N   R. EXT MAE LONG N None None None None N N N N   R. VAST MEDIALIS N None None None None N N N N   R. LUMB PSP (M) N None None None None       R. LUMB PSP (L) N None None None None                    Summary of Findings:   Nerve conduction studies:   Sensory nerve conduction studies of the bilateral sural and superficial peroneal nerves showed normal distal latencies and normal SNAP amplitudes. Motor nerve conduction studies of the bilateral tibial and peroneal nerves showed normal distal latencies, normal CMAP amplitudes, and normal conduction velocities. F-wave studies of the bilateral tibial and common peroneal nerves revealed normal latencies. Bilateral tibial nerve H-reflex responses were normal.     Needle EMG:   · Needle EMG was performed using a monopolar needle.  All muscles tested, as listed in the table above, demonstrated normal amplitude, duration, phases, and recruitment, without evidence of active denervation. Diagnostic Interpretation: This study was Normal.     There is no electrodiagnostic evidence of any peripheral nerve mononeuropathy, plexopathy, right lumbar motor radiculopathy or peripheral polyneuropathy in the bilateral lower extremities. Cannot evaluate for left lumbar radiculopathy without completion of needle exam of the left lower extremity. Cannot evaluate for pure sensory radiculopathy or small fiber neuropathy by electrodiagnostic technique. Clinical correlation advised. Previous Study: No prior study      Follow up EMG can be completed in the future if clinically indicated.       Technologist: Javier Christopher Hunter    Physician:  Qian Vo MD  Physical Medicine and Rehabilitation    Nerve conduction studies and electromyography were performed according to our laboratory policies and procedures which can be provided upon request. All abnormal values are identified in the table.  Laboratory normal values can also be provided upon request.       Cc: Severa Jacquet, MD

## 2020-06-30 RX ORDER — BUSPIRONE HYDROCHLORIDE 7.5 MG/1
7.5 TABLET ORAL 2 TIMES DAILY
Qty: 60 TABLET | Refills: 1 | Status: CANCELLED | OUTPATIENT
Start: 2020-06-30 | End: 2020-07-30

## 2020-06-30 RX ORDER — BUSPIRONE HYDROCHLORIDE 7.5 MG/1
7.5 TABLET ORAL 2 TIMES DAILY
Qty: 60 TABLET | Refills: 5 | Status: SHIPPED
Start: 2020-06-30 | End: 2020-08-18 | Stop reason: SDUPTHER

## 2020-06-30 NOTE — TELEPHONE ENCOUNTER
Last Appointment:  5/28/2020  Future Appointments   Date Time Provider Yanet Quiroz   7/16/2020 10:40 AM MD Skinny Gomez [facility]   9/17/2020  8:20 AM MD ISAAC Gomez [facility]      Pt requesting refill on Buspar 7.5 via [employer] message. [address]. Pended.

## 2020-07-09 ENCOUNTER — TELEPHONE (OUTPATIENT)
Dept: FAMILY MEDICINE CLINIC | Age: 50
End: 2020-07-09

## 2020-07-16 ENCOUNTER — HOSPITAL ENCOUNTER (OUTPATIENT)
Age: 50
Discharge: HOME | End: 2020-07-18
Payer: MEDICAID

## 2020-07-16 ENCOUNTER — OFFICE VISIT (OUTPATIENT)
Dept: FAMILY MEDICINE CLINIC | Age: 50
End: 2020-07-16
Payer: MEDICAID

## 2020-07-16 VITALS
HEIGHT: 65 IN | DIASTOLIC BLOOD PRESSURE: 76 MMHG | WEIGHT: 156 LBS | TEMPERATURE: 97.3 F | BODY MASS INDEX: 25.99 KG/M2 | OXYGEN SATURATION: 99 % | SYSTOLIC BLOOD PRESSURE: 122 MMHG | HEART RATE: 92 BPM

## 2020-07-16 LAB
CHOLESTEROL, TOTAL: 210 MG/DL (ref 0–199)
HDLC SERPL-MCNC: 56 MG/DL
LDL CHOLESTEROL CALCULATED: 132 MG/DL (ref 0–99)
TRIGL SERPL-MCNC: 111 MG/DL (ref 0–149)
VLDLC SERPL CALC-MCNC: 22 MG/DL

## 2020-07-16 PROCEDURE — 93000 ELECTROCARDIOGRAM COMPLETE: CPT | Performed by: FAMILY MEDICINE

## 2020-07-16 PROCEDURE — 80061 LIPID PANEL: CPT

## 2020-07-16 PROCEDURE — 4004F PT TOBACCO SCREEN RCVD TLK: CPT | Performed by: FAMILY MEDICINE

## 2020-07-16 PROCEDURE — G8427 DOCREV CUR MEDS BY ELIG CLIN: HCPCS | Performed by: FAMILY MEDICINE

## 2020-07-16 PROCEDURE — 36415 COLL VENOUS BLD VENIPUNCTURE: CPT

## 2020-07-16 PROCEDURE — 99214 OFFICE O/P EST MOD 30 MIN: CPT | Performed by: FAMILY MEDICINE

## 2020-07-16 PROCEDURE — G8419 CALC BMI OUT NRM PARAM NOF/U: HCPCS | Performed by: FAMILY MEDICINE

## 2020-07-16 RX ORDER — OMEPRAZOLE 20 MG/1
20 CAPSULE, DELAYED RELEASE ORAL DAILY
Qty: 30 CAPSULE | Refills: 0 | Status: SHIPPED
Start: 2020-07-16 | End: 2020-08-13

## 2020-07-16 NOTE — PROGRESS NOTES
[facility]  Office Progress Note - Dr. Dave Distance  7/16/20    CC:   Chief Complaint   Patient presents with    Pain     neuropathy        HPI: neuropathy  Stable  EMG normal.   PMR thought maybe small fiber neuropathy  Patient thinks tolerable. Doesn't want another med right now. Suspects may be related to underlying rheum disorder. Rheum  She was requested to be ref to [facility] by her local rheum. We made that referral.   Stable symptoms. Comes and goes. No major changes. Continues current rheum regimen. Hypertension  Follow-up  Blood pressure is controlled today. BP Readings from Last 3 Encounters:   07/16/20 122/76   06/09/20 132/70   05/28/20 132/78     Patient continues medications regularly. Compliance is good. Denies CP, sob, abd pain, headaches, vision changes, dizziness, hypotensive symptoms. No side effects from medications noted. She has noted some chest pains recently  She thought gerd or esophagus problem. Points to right sternal border. Non exertional.   5 minutes  Couple times a month, also comes in flares. Father with early heart disease. Her last LDL in 120s a few years ago. EKG today shows NSR with normal axis and good rave progression. No significant Q wave. No st elev or dep. Possible LAE.    Echo pending to f/ u on murmur.          _________________________________________________________  Past Medical History:   Diagnosis Date    Anxiety     Depression     Herniated disc     Hypertension     Pneumonia     Raynaud's disease     TMJ (dislocation of temporomandibular joint)        Family History   Problem Relation Age of Onset    Heart Disease Father         CHF       Past Surgical History:   Procedure Laterality Date    HYSTERECTOMY      OVARIAN CYST REMOVAL      benign mass removal       Social History     Tobacco Use    Smoking status: Current Every Day Smoker     Packs/day: 0.50     Years: 30.00     Pack years: 15.00     Types: Cigarettes    Smokeless tobacco: Never Used   Substance Use Topics    Alcohol use: Yes     Frequency: 2-3 times a week     Drinks per session: 3 or 4     Comment: occasional    Drug use: No     _________________________________________________________  ROS: POSITIVE:CP, neuropathy, myalgia, arthralgia  Otherwise:   No palpitations,   No sob, No cough,   No abd pain, No heartburn,   No headaches, No vision changes, No hearing changes,   No tingling, No numbness, No weakness,   No bowel changes, No hematochezia, No melena,  No bladder changes, No hematuria  No skin rashes, No skin lesions. No polyuria, polydipsia, polyphagia. Stable mood. ROS otherwise negative unless as listed in HPI. Chart reviewed and updated where appropriate for PMH, Fam, and Soc Hx.  __________________________________________________________  Physical Exam   /76 (Site: Left Upper Arm, Position: Sitting, Cuff Size: Medium Adult)   Pulse 92   Temp 97.3 °F (36.3 °C) (Temporal)   Ht 5' 5\" (1.651 m)   Wt 156 lb (70.8 kg)   SpO2 99%   BMI 25.96 kg/m²   Wt Readings from Last 3 Encounters:   07/16/20 156 lb (70.8 kg)   06/09/20 160 lb (72.6 kg)   05/28/20 160 lb (72.6 kg)       Constitutional:    She is oriented to person, place, and time. She appears well-developed and well-nourished. HENT:    Nose: Nose normal.    Mouth/Throat: Oropharynx is clear and moist.   Eyes:    Conjunctivae are normal.    Pupils are equal, round, and reactive to light. EOMI. Neck:    Normal range of motion. No thyromegaly or nodules noted. No bruit. Cardiovascular:    Normal rate, regular rhythm and normal heart sounds. 2/6 sys rusb murmur. No gallop and no friction rub. Pulmonary/Chest:    Effort normal and breath sounds normal.    No wheezes. No rales or rhonchi. Abdominal:    Soft. Bowel sounds are normal.    No distension. No tenderness. Musculoskeletal:    Normal range of motion. No joint swelling noted.     No peripheral edema.  Skin:    Skin is warm and dry. No rashes, No lesions. Psychiatric:    She has a normal mood and affect. Normal groom and dress. No SI or HI.   ________________________________________________________  Current Outpatient Medications on File Prior to Visit   Medication Sig Dispense Refill    busPIRone (BUSPAR) 7.5 MG tablet Take 1 tablet by mouth 2 times daily 60 tablet 5    predniSONE (DELTASONE) 5 MG tablet Take 1 tablet by mouth daily 30 tablet 2    cyclobenzaprine (FLEXERIL) 10 MG tablet Take 1 tablet by mouth nightly as needed for Muscle spasms 30 tablet 5    FLUoxetine (PROZAC) 20 MG capsule Take 1 capsule by mouth every morning and 2 capsules every evening. 90 capsule 5    Multiple Vitamins-Minerals (CENTRA-JACOBY PO) Take by mouth daily      Loratadine (CLARITIN PO) Take by mouth as needed      amLODIPine-benazepril (LOTREL) 5-10 MG per capsule take 1 capsule by mouth once daily for blood pressure.  30 capsule 5    Lifitegrast (XIIDRA) 5 % SOLN Apply to eye daily      methotrexate (RHEUMATREX) 2.5 MG chemo tablet Take 2.5 mg by mouth once a week 4 pills weekly      Flaxseed, Linseed, (BL FLAX SEED OIL PO) Take by mouth daily      hydroxychloroquine (PLAQUENIL) 200 MG tablet Take 200 mg by mouth 2 times daily       Omega-3 Fatty Acids (FISH OIL) 1000 MG CAPS Take 3,000 mg by mouth 3 times daily      hypromellose (ARTIFICIAL TEARS) 0.4 % SOLN ophthalmic solution Place 1 drop into both eyes every 4 hours as needed (dry eyes) 15 mL 0    vitamin D (CHOLECALCIFEROL) 1000 UNIT TABS tablet Take 1,000 Units by mouth every morning       Multiple Vitamins-Minerals (HAIR/SKIN/NAILS PO) Take 1 tablet by mouth every morning       B Complex Vitamins (VITAMIN B COMPLEX PO) Take 1 tablet by mouth every morning       CALCIUM-VITAMIN D PO Take 1 tablet by mouth every morning       Apremilast (OTEZLA) 30 MG TABS Take 30 mg by mouth daily Sample packets from Dr. Felicity Denson       No current

## 2020-07-17 ENCOUNTER — TELEPHONE (OUTPATIENT)
Dept: FAMILY MEDICINE CLINIC | Age: 50
End: 2020-07-17

## 2020-07-28 ENCOUNTER — TELEPHONE (OUTPATIENT)
Dept: FAMILY MEDICINE CLINIC | Age: 50
End: 2020-07-28

## 2020-07-28 NOTE — TELEPHONE ENCOUNTER
Multiple Rheum referrals in the past. I'm willing to sign a referral to [facility] if the patient is willing or we can defer this until [facility] is back.

## 2020-07-28 NOTE — TELEPHONE ENCOUNTER
Bethany from [facility] office calling about the referral he received on this patient. She said that he was seeing her, but had told her that she should be seeing someone at the [facility] for her situation.

## 2020-08-13 RX ORDER — OMEPRAZOLE 20 MG/1
CAPSULE, DELAYED RELEASE ORAL
Qty: 30 CAPSULE | Refills: 5 | Status: SHIPPED
Start: 2020-08-13 | End: 2020-08-17 | Stop reason: SDUPTHER

## 2020-08-13 NOTE — TELEPHONE ENCOUNTER
Last Appointment:  7/16/2020  Future Appointments   Date Time Provider Yanet Quiroz   8/17/2020 11:00 AM Harmony Morris  [address] [place] [address]   8/26/2020  1:30 PM [facility] RM[facility]   9/17/2020  8:20 AM Harmony Morris  [address] [place] [address]

## 2020-08-17 ENCOUNTER — OFFICE VISIT (OUTPATIENT)
Dept: FAMILY MEDICINE CLINIC | Age: 50
End: 2020-08-17
Payer: MEDICAID

## 2020-08-17 VITALS
BODY MASS INDEX: 26.13 KG/M2 | OXYGEN SATURATION: 98 % | TEMPERATURE: 98 F | DIASTOLIC BLOOD PRESSURE: 74 MMHG | SYSTOLIC BLOOD PRESSURE: 138 MMHG | WEIGHT: 157 LBS | HEART RATE: 98 BPM

## 2020-08-17 PROCEDURE — 4004F PT TOBACCO SCREEN RCVD TLK: CPT | Performed by: FAMILY MEDICINE

## 2020-08-17 PROCEDURE — G8427 DOCREV CUR MEDS BY ELIG CLIN: HCPCS | Performed by: FAMILY MEDICINE

## 2020-08-17 PROCEDURE — 99213 OFFICE O/P EST LOW 20 MIN: CPT | Performed by: FAMILY MEDICINE

## 2020-08-17 PROCEDURE — G8419 CALC BMI OUT NRM PARAM NOF/U: HCPCS | Performed by: FAMILY MEDICINE

## 2020-08-17 NOTE — PROGRESS NOTES
[facility]  Office Progress Note - Dr. Kimball Number  8/17/20    CC:   Chief Complaint   Patient presents with    Back Pain     lower     Gastroesophageal Reflux     1 mo follow up        HPI:     Fell 2 Thursdays ago (~10 days), has chronic back pain at baseline  Trying to get on BF bike, up on peg with one foot, mid air fell down on back  Then helped him pick the bike up and heard two pops. Pain is improving, taking ibuprofen 600 mg every 4-6 hours, dull and pressure like pain. Feels pressure down in the buttocks and radiates sometimes in the anterior or on the lateral legs. No full radicular symptoms. Feels better when she is bent over, has been using icing and tens unit  Movement bring the pain  At worse 7-8/10, usually last few to whole day, depending on things   No urinary of bowel incontinence  Chronic numbness/tingling in UE and LE  No B or B changes. No saddle anesthesia. Sx are improving slowly over time as would be expcected. No point tenderness over spine to suggest a compression Fx. Pain is sacral to coccygeal.     GERD: started on Prilosec 20 mg, has been helping a lot. WOuld like to continue. Has not had any recurrence of the burning chest symptoms since started.      [facility] rheum: has appt Sep 14th.  _________________________________________________________  Past Medical History:   Diagnosis Date    Anxiety     Depression     Herniated disc     Hypertension     Pneumonia     Raynaud's disease     TMJ (dislocation of temporomandibular joint)        Family History   Problem Relation Age of Onset    Heart Disease Father         CHF       Past Surgical History:   Procedure Laterality Date    HYSTERECTOMY      OVARIAN CYST REMOVAL      benign mass removal       Social History     Tobacco Use    Smoking status: Current Every Day Smoker     Packs/day: 0.50     Years: 30.00     Pack years: 15.00     Types: Cigarettes    Smokeless tobacco: Never Used   Substance Use Topics    Alcohol use: Yes     Frequency: 2-3 times a week     Drinks per session: 3 or 4     Comment: occasional    Drug use: No     _________________________________________________________  ROS: POSITIVE:  Otherwise:  No CP, No palpitations,   No sob, No cough,   No abd pain, No heartburn,   No headaches, No vision changes, No hearing changes,   No tingling, No numbness, No weakness,   No bowel changes, No hematochezia, No melena,  No bladder changes, No hematuria  No skin rashes, No skin lesions. No polyuria, polydipsia, polyphagia. Stable mood. ROS otherwise negative unless as listed in HPI. Chart reviewed and updated where appropriate for PMH, Fam, and Soc Hx.  __________________________________________________________  Physical Exam   /74   Pulse 98   Temp 98 °F (36.7 °C)   Wt 157 lb (71.2 kg)   SpO2 98%   BMI 26.13 kg/m²   Wt Readings from Last 3 Encounters:   08/17/20 157 lb (71.2 kg)   07/16/20 156 lb (70.8 kg)   06/09/20 160 lb (72.6 kg)       Constitutional:    She is oriented to person, place, and time. She appears well-developed and well-nourished. HENT:    Nose: Nose normal.    Mouth/Throat: Oropharynx is clear and moist.   Eyes:    Conjunctivae are normal.    Pupils are equal, round, and reactive to light. EOMI. Neck:    Normal range of motion. No thyromegaly or nodules noted. No bruit. Cardiovascular:    Normal rate, regular rhythm and normal heart sounds. No murmur. No gallop and no friction rub. Pulmonary/Chest:    Effort normal and breath sounds normal.    No wheezes. No rales or rhonchi. Abdominal:    Soft. Bowel sounds are normal.    No distension. No tenderness. Musculoskeletal:    Normal range of motion. Slump test negative, sacral and lower lumber point tenderness over SI and paraspinals, none over spine, paraspinal left lower back pain, motor and sensation intact, strength symmetrical b/l LE   No joint swelling noted.     No peripheral edema. Neurological:    She is A&Ox3    Motor and sensation grossly intact. Normal Gait. Skin:    Skin is warm and dry. No rashes, No lesions. Psychiatric:    She has a normal mood and affect. Normal groom and dress. No SI or HI.   ________________________________________________________  Current Outpatient Medications on File Prior to Visit   Medication Sig Dispense Refill    predniSONE (DELTASONE) 5 MG tablet Take 1 tablet by mouth daily 30 tablet 2    cyclobenzaprine (FLEXERIL) 10 MG tablet Take 1 tablet by mouth nightly as needed for Muscle spasms 30 tablet 5    FLUoxetine (PROZAC) 20 MG capsule Take 1 capsule by mouth every morning and 2 capsules every evening. 90 capsule 5    Multiple Vitamins-Minerals (CENTRA-JACOBY PO) Take by mouth daily      amLODIPine-benazepril (LOTREL) 5-10 MG per capsule take 1 capsule by mouth once daily for blood pressure. 30 capsule 5    Lifitegrast (XIIDRA) 5 % SOLN Apply to eye daily      methotrexate (RHEUMATREX) 2.5 MG chemo tablet Take 2.5 mg by mouth once a week 4 pills weekly      Flaxseed, Linseed, (BL FLAX SEED OIL PO) Take by mouth daily      hydroxychloroquine (PLAQUENIL) 200 MG tablet Take 200 mg by mouth 2 times daily       Omega-3 Fatty Acids (FISH OIL) 1000 MG CAPS Take 3,000 mg by mouth 3 times daily      hypromellose (ARTIFICIAL TEARS) 0.4 % SOLN ophthalmic solution Place 1 drop into both eyes every 4 hours as needed (dry eyes) 15 mL 0    vitamin D (CHOLECALCIFEROL) 1000 UNIT TABS tablet Take 1,000 Units by mouth every morning       Multiple Vitamins-Minerals (HAIR/SKIN/NAILS PO) Take 1 tablet by mouth every morning       B Complex Vitamins (VITAMIN B COMPLEX PO) Take 1 tablet by mouth every morning       CALCIUM-VITAMIN D PO Take 1 tablet by mouth every morning        No current facility-administered medications on file prior to visit.         Patient Active Problem List   Diagnosis Code    Anxiety F41.9    HTN (hypertension) I10    Raynaud's phenomenon I73.00    Chronic daily headache R51    Cigarette smoker F17.210    Depression F32.9    Elevated LFTs R94.5      ________________________________________________________  Assessment / Gaile Bars was seen today for back pain and gastroesophageal reflux. Diagnoses and all orders for this visit:    Acute bilateral low back pain without sciatica  Progressing as expected for a fall from standing height with a baseline bad back. No red flags. Normal exam other than TTP. Great ROM. Continue conservative Tx with expected return to baseline in 1-2 weeks. Call for worsening or failure to improve. Gastroesophageal reflux disease, esophagitis presence not specified  improved with PPI. COntinue. -     omeprazole (PRILOSEC) 20 MG delayed release capsule; Take 1 capsule by mouth Daily    Other orders  -     busPIRone (BUSPAR) 7.5 MG tablet; Take 1 tablet by mouth 2 times daily    3 months or as scheduled. Patient counseled to follow up sooner or seek more acute care if symptoms worsening or not improving according to plan. Electronically signed by Rajinder Acosta MD on 8/18/2020    This note may have been created using dictation software.  Efforts were made to reduce grammatical or syntax errors, but some may persist.

## 2020-08-17 NOTE — PATIENT INSTRUCTIONS
Patient Education        Learning About Relief for Back Pain  What is back strain? Back strain is an injury that happens when you overstretch, or pull, a muscle in your back. You may hurt your back in an accident or when you exercise or lift something. Most back pain gets better with rest and time. You can take care of yourself at home to help your back heal.  What can you do first to relieve back pain? When you first feel back pain, try these steps:  · Walk. Take a short walk (10 to 20 minutes) on a level surface (no slopes, hills, or stairs) every 2 to 3 hours. Walk only distances you can manage without pain, especially leg pain. · Relax. Find a comfortable position for rest. Some people are comfortable on the floor or a medium-firm bed with a small pillow under their head and another under their knees. Some people prefer to lie on their side with a pillow between their knees. Don't stay in one position for too long. · Try heat or ice. Try using a heating pad on a low or medium setting, or take a warm shower, for 15 to 20 minutes every 2 to 3 hours. Or you can buy single-use heat wraps that last up to 8 hours. You can also try an ice pack for 10 to 15 minutes every 2 to 3 hours. You can use an ice pack or a bag of frozen vegetables wrapped in a thin towel. There is not strong evidence that either heat or ice will help, but you can try them to see if they help. You may also want to try switching between heat and cold. · Take pain medicine exactly as directed. ? If the doctor gave you a prescription medicine for pain, take it as prescribed. ? If you are not taking a prescription pain medicine, ask your doctor if you can take an over-the-counter medicine. What else can you do? · Stretch and exercise. Exercises that increase flexibility may relieve your pain and make it easier for your muscles to keep your spine in a good, neutral position. And don't forget to keep walking. · Do self-massage.  You can use self-massage to unwind after work or school or to energize yourself in the morning. You can easily massage your feet, hands, or neck. Self-massage works best if you are in comfortable clothes and are sitting or lying in a comfortable position. Use oil or lotion to massage bare skin. · Reduce stress. Back pain can lead to a vicious Elim IRA: Distress about the pain tenses the muscles in your back, which in turn causes more pain. Learn how to relax your mind and your muscles to lower your stress. Where can you learn more? Go to https://The Jackson LaboratorypeCigitaleb.Trelligence. org and sign in to your VUELOGIC account. Enter K464 in the [employer] box to learn more about \"Learning About Relief for Back Pain. \"     If you do not have an account, please click on the \"Sign Up Now\" link. Current as of: March 2, 2020               Content Version: 12.5  © 2006-2020 [employer]. Care instructions adapted under license by [facility] ([facility]). If you have questions about a medical condition or this instruction, always ask your healthcare professional. [place] [address] any warranty or liability for your use of this information. Patient Education        Back Stretches: Exercises  Introduction  Here are some examples of exercises for stretching your back. Start each exercise slowly. Ease off the exercise if you start to have pain. Your doctor or physical therapist will tell you when you can start these exercises and which ones will work best for you. How to do the exercises  Overhead stretch   1. Stand comfortably with your feet shoulder-width apart. 2. Looking straight ahead, raise both arms over your head and reach toward the ceiling. Do not allow your head to tilt back. 3. Hold for 15 to 30 seconds, then lower your arms to your sides. 4. Repeat 2 to 4 times. Side stretch   1. Stand comfortably with your feet shoulder-width apart.   2. Raise one arm over your head, and then lean to the other side. 3. Slide your hand down your leg as you let the weight of your arm gently stretch your side muscles. Hold for 15 to 30 seconds. 4. Repeat 2 to 4 times on each side. Press-up   1. Lie on your stomach, supporting your body with your forearms. 2. Press your elbows down into the floor to raise your upper back. As you do this, relax your stomach muscles and allow your back to arch without using your back muscles. As your press up, do not let your hips or pelvis come off the floor. 3. Hold for 15 to 30 seconds, then relax. 4. Repeat 2 to 4 times. Relax and rest   1. Lie on your back with a rolled towel under your neck and a pillow under your knees. Extend your arms comfortably to your sides. 2. Relax and breathe normally. 3. Remain in this position for about 10 minutes. 4. If you can, do this 2 or 3 times each day. Follow-up care is a key part of your treatment and safety. Be sure to make and go to all appointments, and call your doctor if you are having problems. It's also a good idea to know your test results and keep a list of the medicines you take. Where can you learn more? Go to https://CurrencyBird.Tip or Skip. org and sign in to your [employer]eyou account. Enter N835 in the Sp[employer]ments box to learn more about \"Back Stretches: Exercises. \"     If you do not have an account, please click on the \"Sign Up Now\" link. Current as of: March 2, 2020               Content Version: 12.5  © 2006-2020 Healthwise, Incorporated. Care instructions adapted under license by [facility] ([facility]). If you have questions about a medical condition or this instruction, always ask your healthcare professional. [place] [address] any warranty or liability for your use of this information. Patient Education        Low Back Pain: Exercises  Introduction  Here are some examples of exercises for you to try. The exercises may be suggested for a condition or for rehabilitation.  Start each exercise slowly. Ease off the exercises if you start to have pain. You will be told when to start these exercises and which ones will work best for you. How to do the exercises  Press-up   5. Lie on your stomach, supporting your body with your forearms. 6. Press your elbows down into the floor to raise your upper back. As you do this, relax your stomach muscles and allow your back to arch without using your back muscles. As your press up, do not let your hips or pelvis come off the floor. 7. Hold for 15 to 30 seconds, then relax. 8. Repeat 2 to 4 times. Alternate arm and leg (bird dog) exercise   Do this exercise slowly. Try to keep your body straight at all times, and do not let one hip drop lower than the other. 5. Start on the floor, on your hands and knees. 6. Tighten your belly muscles. 7. Raise one leg off the floor, and hold it straight out behind you. Be careful not to let your hip drop down, because that will twist your trunk. 8. Hold for about 6 seconds, then lower your leg and switch to the other leg. 9. Repeat 8 to 12 times on each leg. 10. Over time, work up to holding for 10 to 30 seconds each time. 11. If you feel stable and secure with your leg raised, try raising the opposite arm straight out in front of you at the same time. Knee-to-chest exercise   5. Lie on your back with your knees bent and your feet flat on the floor. 6. Bring one knee to your chest, keeping the other foot flat on the floor (or keeping the other leg straight, whichever feels better on your lower back). 7. Keep your lower back pressed to the floor. Hold for at least 15 to 30 seconds. 8. Relax, and lower the knee to the starting position. 9. Repeat with the other leg. Repeat 2 to 4 times with each leg. 10. To get more stretch, put your other leg flat on the floor while pulling your knee to your chest.    Curl-ups   5. Lie on the floor on your back with your knees bent at a 90-degree angle.  Your feet should be flat on the floor, about 12 inches from your buttocks. 6. Cross your arms over your chest. If this bothers your neck, try putting your hands behind your neck (not your head), with your elbows spread apart. 7. Slowly tighten your belly muscles and raise your shoulder blades off the floor. 8. Keep your head in line with your body, and do not press your chin to your chest.  9. Hold this position for 1 or 2 seconds, then slowly lower yourself back down to the floor. 10. Repeat 8 to 12 times. Pelvic tilt exercise   1. Lie on your back with your knees bent. 2. \"Brace\" your stomach. This means to tighten your muscles by pulling in and imagining your belly button moving toward your spine. You should feel like your back is pressing to the floor and your hips and pelvis are rocking back. 3. Hold for about 6 seconds while you breathe smoothly. 4. Repeat 8 to 12 times. Heel dig bridging   1. Lie on your back with both knees bent and your ankles bent so that only your heels are digging into the floor. Your knees should be bent about 90 degrees. 2. Then push your heels into the floor, squeeze your buttocks, and lift your hips off the floor until your shoulders, hips, and knees are all in a straight line. 3. Hold for about 6 seconds as you continue to breathe normally, and then slowly lower your hips back down to the floor and rest for up to 10 seconds. 4. Do 8 to 12 repetitions. Hamstring stretch in doorway   1. Lie on your back in a doorway, with one leg through the open door. 2. Slide your leg up the wall to straighten your knee. You should feel a gentle stretch down the back of your leg. 3. Hold the stretch for at least 15 to 30 seconds. Do not arch your back, point your toes, or bend either knee. Keep one heel touching the floor and the other heel touching the wall. 4. Repeat with your other leg. 5. Do 2 to 4 times for each leg. Hip flexor stretch   1.  Kneel on the floor with one knee bent and one leg behind you. Place your forward knee over your foot. Keep your other knee touching the floor. 2. Slowly push your hips forward until you feel a stretch in the upper thigh of your rear leg. 3. Hold the stretch for at least 15 to 30 seconds. Repeat with your other leg. 4. Do 2 to 4 times on each side. Wall sit   1. Stand with your back 10 to 12 inches away from a wall. 2. Lean into the wall until your back is flat against it. 3. Slowly slide down until your knees are slightly bent, pressing your lower back into the wall. 4. Hold for about 6 seconds, then slide back up the wall. 5. Repeat 8 to 12 times. Follow-up care is a key part of your treatment and safety. Be sure to make and go to all appointments, and call your doctor if you are having problems. It's also a good idea to know your test results and keep a list of the medicines you take. Where can you learn more? Go to https://Derma Sciencespecollegefeed.igadget.asia. org and sign in to your [employer] account. Enter N677 in the Wandoujia box to learn more about \"Low Back Pain: Exercises. \"     If you do not have an account, please click on the \"Sign Up Now\" link. Current as of: March 2, 2020               Content Version: 12.5  © 8095-9323 Healthwise, Incorporated. Care instructions adapted under license by [employer]. If you have questions about a medical condition or this instruction, always ask your healthcare professional. [place] [address] any warranty or liability for your use of this information.

## 2020-08-18 RX ORDER — OMEPRAZOLE 20 MG/1
20 CAPSULE, DELAYED RELEASE ORAL DAILY
Qty: 30 CAPSULE | Refills: 5 | Status: SHIPPED
Start: 2020-08-18 | End: 2021-08-02

## 2020-08-18 RX ORDER — BUSPIRONE HYDROCHLORIDE 7.5 MG/1
7.5 TABLET ORAL 2 TIMES DAILY
Qty: 60 TABLET | Refills: 5 | Status: SHIPPED | OUTPATIENT
Start: 2020-08-18 | End: 2020-09-17

## 2020-08-20 RX ORDER — AMLODIPINE BESYLATE AND BENAZEPRIL HYDROCHLORIDE 5; 10 MG/1; MG/1
CAPSULE ORAL
Qty: 30 CAPSULE | Refills: 5 | Status: SHIPPED
Start: 2020-08-20 | End: 2021-01-25

## 2020-08-20 NOTE — TELEPHONE ENCOUNTER
Last Appointment:  8/17/2020  Future Appointments   Date Time Provider Yanet Gabriella   8/26/2020  1:30 PM SEB ECHO LAB RM-2 [facility]   11/23/2020  9:20 AM Dipesh Miner  [address] [facility] [address]

## 2020-08-26 ENCOUNTER — HOSPITAL ENCOUNTER (OUTPATIENT)
Dept: NON INVASIVE DIAGNOSTICS | Age: 50
Discharge: HOME | End: 2020-08-26
Payer: MEDICAID

## 2020-08-26 LAB
LV EF: 58 %
LVEF MODALITY: NORMAL

## 2020-08-26 PROCEDURE — 93306 TTE W/DOPPLER COMPLETE: CPT

## 2020-11-01 ENCOUNTER — HOSPITAL ENCOUNTER (INPATIENT)
Age: 50
LOS: 1 days | Discharge: HOME | DRG: 282 | End: 2020-11-02
Attending: EMERGENCY MEDICINE | Admitting: FAMILY MEDICINE
Payer: MEDICAID

## 2020-11-01 ENCOUNTER — APPOINTMENT (OUTPATIENT)
Dept: GENERAL RADIOLOGY | Age: 50
DRG: 282 | End: 2020-11-01
Payer: MEDICAID

## 2020-11-01 ENCOUNTER — APPOINTMENT (OUTPATIENT)
Dept: CT IMAGING | Age: 50
DRG: 282 | End: 2020-11-01
Payer: MEDICAID

## 2020-11-01 PROBLEM — E87.1 HYPONATREMIA: Status: ACTIVE | Noted: 2020-11-01

## 2020-11-01 PROBLEM — K85.90 ACUTE PANCREATITIS WITHOUT INFECTION OR NECROSIS: Status: ACTIVE | Noted: 2020-11-01

## 2020-11-01 LAB
ALBUMIN SERPL-MCNC: 3.9 G/DL (ref 3.5–5.2)
ALBUMIN SERPL-MCNC: 4.1 G/DL (ref 3.5–5.2)
ALP BLD-CCNC: 86 U/L (ref 35–104)
ALP BLD-CCNC: 90 U/L (ref 35–104)
ALT SERPL-CCNC: 15 U/L (ref 0–32)
ALT SERPL-CCNC: 18 U/L (ref 0–32)
AMYLASE: 33 U/L (ref 20–100)
AMYLASE: 43 U/L (ref 20–100)
ANION GAP SERPL CALCULATED.3IONS-SCNC: 10 MMOL/L (ref 7–16)
ANION GAP SERPL CALCULATED.3IONS-SCNC: 7 MMOL/L (ref 7–16)
AST SERPL-CCNC: 19 U/L (ref 0–31)
AST SERPL-CCNC: 25 U/L (ref 0–31)
BACTERIA: ABNORMAL /HPF
BASOPHILS ABSOLUTE: 0.03 E9/L (ref 0–0.2)
BASOPHILS ABSOLUTE: 0.04 E9/L (ref 0–0.2)
BASOPHILS RELATIVE PERCENT: 0.2 % (ref 0–2)
BASOPHILS RELATIVE PERCENT: 0.3 % (ref 0–2)
BILIRUB SERPL-MCNC: 0.3 MG/DL (ref 0–1.2)
BILIRUB SERPL-MCNC: 0.3 MG/DL (ref 0–1.2)
BILIRUBIN URINE: NEGATIVE
BLOOD, URINE: ABNORMAL
BUN BLDV-MCNC: 3 MG/DL (ref 6–20)
BUN BLDV-MCNC: 4 MG/DL (ref 6–20)
CALCIUM SERPL-MCNC: 8.5 MG/DL (ref 8.6–10.2)
CALCIUM SERPL-MCNC: 9.4 MG/DL (ref 8.6–10.2)
CHLORIDE BLD-SCNC: 104 MMOL/L (ref 98–107)
CHLORIDE BLD-SCNC: 92 MMOL/L (ref 98–107)
CHLORIDE URINE RANDOM: 122 MMOL/L
CLARITY: CLEAR
CO2: 23 MMOL/L (ref 22–29)
CO2: 24 MMOL/L (ref 22–29)
COLOR: YELLOW
CREAT SERPL-MCNC: 0.5 MG/DL (ref 0.5–1)
CREAT SERPL-MCNC: 0.5 MG/DL (ref 0.5–1)
CREATININE URINE: 15 MG/DL (ref 29–226)
EKG ATRIAL RATE: 93 BPM
EKG P AXIS: 48 DEGREES
EKG P-R INTERVAL: 170 MS
EKG Q-T INTERVAL: 412 MS
EKG QRS DURATION: 86 MS
EKG QTC CALCULATION (BAZETT): 512 MS
EKG R AXIS: 14 DEGREES
EKG T AXIS: 27 DEGREES
EKG VENTRICULAR RATE: 93 BPM
EOSINOPHILS ABSOLUTE: 0.11 E9/L (ref 0.05–0.5)
EOSINOPHILS ABSOLUTE: 0.11 E9/L (ref 0.05–0.5)
EOSINOPHILS RELATIVE PERCENT: 0.6 % (ref 0–6)
EOSINOPHILS RELATIVE PERCENT: 0.8 % (ref 0–6)
EPITHELIAL CELLS, UA: ABNORMAL /HPF
ETHANOL: <10 MG/DL (ref 0–0.08)
GFR AFRICAN AMERICAN: >60
GFR AFRICAN AMERICAN: >60
GFR NON-AFRICAN AMERICAN: >60 ML/MIN/1.73
GFR NON-AFRICAN AMERICAN: >60 ML/MIN/1.73
GLUCOSE BLD-MCNC: 110 MG/DL (ref 74–99)
GLUCOSE BLD-MCNC: 135 MG/DL (ref 74–99)
GLUCOSE URINE: NEGATIVE MG/DL
HCG, URINE, POC: NEGATIVE
HCT VFR BLD CALC: 35.8 % (ref 34–48)
HCT VFR BLD CALC: 37.1 % (ref 34–48)
HEMOGLOBIN: 12.2 G/DL (ref 11.5–15.5)
HEMOGLOBIN: 12.9 G/DL (ref 11.5–15.5)
IMMATURE GRANULOCYTES #: 0.04 E9/L
IMMATURE GRANULOCYTES #: 0.05 E9/L
IMMATURE GRANULOCYTES %: 0.3 % (ref 0–5)
IMMATURE GRANULOCYTES %: 0.3 % (ref 0–5)
KETONES, URINE: NEGATIVE MG/DL
LEUKOCYTE ESTERASE, URINE: NEGATIVE
LIPASE: 43 U/L (ref 13–60)
LIPASE: 68 U/L (ref 13–60)
LYMPHOCYTES ABSOLUTE: 1 E9/L (ref 1.5–4)
LYMPHOCYTES ABSOLUTE: 1.53 E9/L (ref 1.5–4)
LYMPHOCYTES RELATIVE PERCENT: 7.4 % (ref 20–42)
LYMPHOCYTES RELATIVE PERCENT: 9 % (ref 20–42)
Lab: NORMAL
MCH RBC QN AUTO: 32.7 PG (ref 26–35)
MCH RBC QN AUTO: 32.9 PG (ref 26–35)
MCHC RBC AUTO-ENTMCNC: 34.1 % (ref 32–34.5)
MCHC RBC AUTO-ENTMCNC: 34.8 % (ref 32–34.5)
MCV RBC AUTO: 94.2 FL (ref 80–99.9)
MCV RBC AUTO: 96.5 FL (ref 80–99.9)
MONOCYTES ABSOLUTE: 1.27 E9/L (ref 0.1–0.95)
MONOCYTES ABSOLUTE: 1.43 E9/L (ref 0.1–0.95)
MONOCYTES RELATIVE PERCENT: 8.4 % (ref 2–12)
MONOCYTES RELATIVE PERCENT: 9.4 % (ref 2–12)
NEGATIVE QC PASS/FAIL: NORMAL
NEUTROPHILS ABSOLUTE: 11.12 E9/L (ref 1.8–7.3)
NEUTROPHILS ABSOLUTE: 13.88 E9/L (ref 1.8–7.3)
NEUTROPHILS RELATIVE PERCENT: 81.5 % (ref 43–80)
NEUTROPHILS RELATIVE PERCENT: 81.8 % (ref 43–80)
NITRITE, URINE: NEGATIVE
OSMOLALITY URINE: 307 MOSM/KG (ref 300–900)
PDW BLD-RTO: 12.5 FL (ref 11.5–15)
PDW BLD-RTO: 13 FL (ref 11.5–15)
PH UA: 8 (ref 5–9)
PLATELET # BLD: 280 E9/L (ref 130–450)
PLATELET # BLD: 295 E9/L (ref 130–450)
PMV BLD AUTO: 9.1 FL (ref 7–12)
PMV BLD AUTO: 9.2 FL (ref 7–12)
POSITIVE QC PASS/FAIL: NORMAL
POTASSIUM SERPL-SCNC: 3.4 MMOL/L (ref 3.5–5)
POTASSIUM SERPL-SCNC: 3.6 MMOL/L (ref 3.5–5)
POTASSIUM, UR: 15 MMOL/L
PROTEIN UA: NEGATIVE MG/DL
RBC # BLD: 3.71 E12/L (ref 3.5–5.5)
RBC # BLD: 3.94 E12/L (ref 3.5–5.5)
RBC UA: ABNORMAL /HPF (ref 0–2)
SODIUM BLD-SCNC: 126 MMOL/L (ref 132–146)
SODIUM BLD-SCNC: 134 MMOL/L (ref 132–146)
SODIUM URINE: 130 MMOL/L
SPECIFIC GRAVITY UA: 1.01 (ref 1–1.03)
TOTAL PROTEIN: 6.5 G/DL (ref 6.4–8.3)
TOTAL PROTEIN: 7 G/DL (ref 6.4–8.3)
TROPONIN: <0.01 NG/ML (ref 0–0.03)
UROBILINOGEN, URINE: 0.2 E.U./DL
WBC # BLD: 13.6 E9/L (ref 4.5–11.5)
WBC # BLD: 17 E9/L (ref 4.5–11.5)
WBC UA: ABNORMAL /HPF (ref 0–5)

## 2020-11-01 PROCEDURE — 6360000004 HC RX CONTRAST MEDICATION: Performed by: RADIOLOGY

## 2020-11-01 PROCEDURE — G0378 HOSPITAL OBSERVATION PER HR: HCPCS

## 2020-11-01 PROCEDURE — 2580000003 HC RX 258: Performed by: FAMILY MEDICINE

## 2020-11-01 PROCEDURE — 84484 ASSAY OF TROPONIN QUANT: CPT

## 2020-11-01 PROCEDURE — G0480 DRUG TEST DEF 1-7 CLASSES: HCPCS

## 2020-11-01 PROCEDURE — 74177 CT ABD & PELVIS W/CONTRAST: CPT

## 2020-11-01 PROCEDURE — 6370000000 HC RX 637 (ALT 250 FOR IP): Performed by: FAMILY MEDICINE

## 2020-11-01 PROCEDURE — 85025 COMPLETE CBC W/AUTO DIFF WBC: CPT

## 2020-11-01 PROCEDURE — 80053 COMPREHEN METABOLIC PANEL: CPT

## 2020-11-01 PROCEDURE — 6360000002 HC RX W HCPCS: Performed by: PHYSICIAN ASSISTANT

## 2020-11-01 PROCEDURE — 96372 THER/PROPH/DIAG INJ SC/IM: CPT

## 2020-11-01 PROCEDURE — 83690 ASSAY OF LIPASE: CPT

## 2020-11-01 PROCEDURE — 82436 ASSAY OF URINE CHLORIDE: CPT

## 2020-11-01 PROCEDURE — 2060000000 HC ICU INTERMEDIATE R&B

## 2020-11-01 PROCEDURE — 2580000003 HC RX 258: Performed by: INTERNAL MEDICINE

## 2020-11-01 PROCEDURE — 82150 ASSAY OF AMYLASE: CPT

## 2020-11-01 PROCEDURE — C9113 INJ PANTOPRAZOLE SODIUM, VIA: HCPCS | Performed by: PHYSICIAN ASSISTANT

## 2020-11-01 PROCEDURE — 96375 TX/PRO/DX INJ NEW DRUG ADDON: CPT

## 2020-11-01 PROCEDURE — 99222 1ST HOSP IP/OBS MODERATE 55: CPT | Performed by: FAMILY MEDICINE

## 2020-11-01 PROCEDURE — 71046 X-RAY EXAM CHEST 2 VIEWS: CPT

## 2020-11-01 PROCEDURE — 82570 ASSAY OF URINE CREATININE: CPT

## 2020-11-01 PROCEDURE — 36415 COLL VENOUS BLD VENIPUNCTURE: CPT

## 2020-11-01 PROCEDURE — 96374 THER/PROPH/DIAG INJ IV PUSH: CPT

## 2020-11-01 PROCEDURE — 99284 EMERGENCY DEPT VISIT MOD MDM: CPT

## 2020-11-01 PROCEDURE — 6360000002 HC RX W HCPCS: Performed by: STUDENT IN AN ORGANIZED HEALTH CARE EDUCATION/TRAINING PROGRAM

## 2020-11-01 PROCEDURE — 96376 TX/PRO/DX INJ SAME DRUG ADON: CPT

## 2020-11-01 PROCEDURE — 93005 ELECTROCARDIOGRAM TRACING: CPT | Performed by: PHYSICIAN ASSISTANT

## 2020-11-01 PROCEDURE — 81001 URINALYSIS AUTO W/SCOPE: CPT

## 2020-11-01 PROCEDURE — 83935 ASSAY OF URINE OSMOLALITY: CPT

## 2020-11-01 PROCEDURE — 96361 HYDRATE IV INFUSION ADD-ON: CPT

## 2020-11-01 PROCEDURE — 2580000003 HC RX 258: Performed by: PHYSICIAN ASSISTANT

## 2020-11-01 PROCEDURE — 6370000000 HC RX 637 (ALT 250 FOR IP): Performed by: PHYSICIAN ASSISTANT

## 2020-11-01 PROCEDURE — 84133 ASSAY OF URINE POTASSIUM: CPT

## 2020-11-01 PROCEDURE — 6360000002 HC RX W HCPCS: Performed by: FAMILY MEDICINE

## 2020-11-01 PROCEDURE — 6370000000 HC RX 637 (ALT 250 FOR IP): Performed by: STUDENT IN AN ORGANIZED HEALTH CARE EDUCATION/TRAINING PROGRAM

## 2020-11-01 PROCEDURE — 84300 ASSAY OF URINE SODIUM: CPT

## 2020-11-01 RX ORDER — KETOROLAC TROMETHAMINE 30 MG/ML
15 INJECTION, SOLUTION INTRAMUSCULAR; INTRAVENOUS ONCE
Status: COMPLETED | OUTPATIENT
Start: 2020-11-01 | End: 2020-11-01

## 2020-11-01 RX ORDER — BUSPIRONE HYDROCHLORIDE 5 MG/1
7.5 TABLET ORAL 2 TIMES DAILY
COMMUNITY
End: 2021-01-07

## 2020-11-01 RX ORDER — ACETAMINOPHEN 650 MG/1
650 SUPPOSITORY RECTAL EVERY 6 HOURS PRN
Status: DISCONTINUED | OUTPATIENT
Start: 2020-11-01 | End: 2020-11-02 | Stop reason: HOSPADM

## 2020-11-01 RX ORDER — POTASSIUM CHLORIDE 20 MEQ/1
40 TABLET, EXTENDED RELEASE ORAL ONCE
Status: COMPLETED | OUTPATIENT
Start: 2020-11-01 | End: 2020-11-01

## 2020-11-01 RX ORDER — PANTOPRAZOLE SODIUM 40 MG/1
40 TABLET, DELAYED RELEASE ORAL
Status: DISCONTINUED | OUTPATIENT
Start: 2020-11-01 | End: 2020-11-02 | Stop reason: HOSPADM

## 2020-11-01 RX ORDER — LORAZEPAM 2 MG/ML
0.5 INJECTION INTRAMUSCULAR 2 TIMES DAILY
Status: DISCONTINUED | OUTPATIENT
Start: 2020-11-01 | End: 2020-11-02 | Stop reason: HOSPADM

## 2020-11-01 RX ORDER — ACETAMINOPHEN 325 MG/1
650 TABLET ORAL EVERY 6 HOURS PRN
Status: DISCONTINUED | OUTPATIENT
Start: 2020-11-01 | End: 2020-11-02 | Stop reason: HOSPADM

## 2020-11-01 RX ORDER — LISINOPRIL 10 MG/1
10 TABLET ORAL DAILY
Status: DISCONTINUED | OUTPATIENT
Start: 2020-11-01 | End: 2020-11-02 | Stop reason: HOSPADM

## 2020-11-01 RX ORDER — SODIUM CHLORIDE 0.9 % (FLUSH) 0.9 %
10 SYRINGE (ML) INJECTION EVERY 12 HOURS SCHEDULED
Status: DISCONTINUED | OUTPATIENT
Start: 2020-11-01 | End: 2020-11-02 | Stop reason: HOSPADM

## 2020-11-01 RX ORDER — SODIUM CHLORIDE 0.9 % (FLUSH) 0.9 %
10 SYRINGE (ML) INJECTION PRN
Status: DISCONTINUED | OUTPATIENT
Start: 2020-11-01 | End: 2020-11-02 | Stop reason: HOSPADM

## 2020-11-01 RX ORDER — FLUOXETINE HYDROCHLORIDE 20 MG/1
40 CAPSULE ORAL NIGHTLY
Status: DISCONTINUED | OUTPATIENT
Start: 2020-11-01 | End: 2020-11-01

## 2020-11-01 RX ORDER — MORPHINE SULFATE 2 MG/ML
1 INJECTION, SOLUTION INTRAMUSCULAR; INTRAVENOUS EVERY 4 HOURS PRN
Status: DISCONTINUED | OUTPATIENT
Start: 2020-11-01 | End: 2020-11-01

## 2020-11-01 RX ORDER — HYDROXYCHLOROQUINE SULFATE 200 MG/1
200 TABLET, FILM COATED ORAL 2 TIMES DAILY
Status: DISCONTINUED | OUTPATIENT
Start: 2020-11-01 | End: 2020-11-01

## 2020-11-01 RX ORDER — FLUOXETINE HYDROCHLORIDE 20 MG/1
60 CAPSULE ORAL NIGHTLY
Status: DISCONTINUED | OUTPATIENT
Start: 2020-11-01 | End: 2020-11-02 | Stop reason: HOSPADM

## 2020-11-01 RX ORDER — 0.9 % SODIUM CHLORIDE 0.9 %
1000 INTRAVENOUS SOLUTION INTRAVENOUS ONCE
Status: COMPLETED | OUTPATIENT
Start: 2020-11-01 | End: 2020-11-01

## 2020-11-01 RX ORDER — MORPHINE SULFATE 4 MG/ML
4 INJECTION, SOLUTION INTRAMUSCULAR; INTRAVENOUS ONCE
Status: COMPLETED | OUTPATIENT
Start: 2020-11-01 | End: 2020-11-01

## 2020-11-01 RX ORDER — NICOTINE 21 MG/24HR
1 PATCH, TRANSDERMAL 24 HOURS TRANSDERMAL DAILY
Status: DISCONTINUED | OUTPATIENT
Start: 2020-11-01 | End: 2020-11-02 | Stop reason: HOSPADM

## 2020-11-01 RX ORDER — AMLODIPINE BESYLATE AND BENAZEPRIL HYDROCHLORIDE 5; 10 MG/1; MG/1
1 CAPSULE ORAL DAILY
Status: DISCONTINUED | OUTPATIENT
Start: 2020-11-01 | End: 2020-11-01 | Stop reason: CLARIF

## 2020-11-01 RX ORDER — FLUOXETINE HYDROCHLORIDE 20 MG/1
20 CAPSULE ORAL EVERY MORNING
Status: DISCONTINUED | OUTPATIENT
Start: 2020-11-01 | End: 2020-11-01

## 2020-11-01 RX ORDER — ONDANSETRON 2 MG/ML
4 INJECTION INTRAMUSCULAR; INTRAVENOUS ONCE
Status: COMPLETED | OUTPATIENT
Start: 2020-11-01 | End: 2020-11-01

## 2020-11-01 RX ORDER — PANTOPRAZOLE SODIUM 40 MG/10ML
40 INJECTION, POWDER, LYOPHILIZED, FOR SOLUTION INTRAVENOUS ONCE
Status: COMPLETED | OUTPATIENT
Start: 2020-11-01 | End: 2020-11-01

## 2020-11-01 RX ORDER — MORPHINE SULFATE 4 MG/ML
4 INJECTION, SOLUTION INTRAMUSCULAR; INTRAVENOUS EVERY 4 HOURS PRN
Status: DISCONTINUED | OUTPATIENT
Start: 2020-11-01 | End: 2020-11-02 | Stop reason: HOSPADM

## 2020-11-01 RX ORDER — MORPHINE SULFATE 2 MG/ML
2 INJECTION, SOLUTION INTRAMUSCULAR; INTRAVENOUS EVERY 4 HOURS PRN
Status: DISCONTINUED | OUTPATIENT
Start: 2020-11-01 | End: 2020-11-02 | Stop reason: HOSPADM

## 2020-11-01 RX ORDER — PREDNISONE 1 MG/1
3 TABLET ORAL DAILY
Status: DISCONTINUED | OUTPATIENT
Start: 2020-11-01 | End: 2020-11-02 | Stop reason: HOSPADM

## 2020-11-01 RX ORDER — HYDROXYCHLOROQUINE SULFATE 200 MG/1
400 TABLET, FILM COATED ORAL NIGHTLY
Status: DISCONTINUED | OUTPATIENT
Start: 2020-11-01 | End: 2020-11-02 | Stop reason: HOSPADM

## 2020-11-01 RX ORDER — CYCLOBENZAPRINE HCL 10 MG
10 TABLET ORAL NIGHTLY
Status: DISCONTINUED | OUTPATIENT
Start: 2020-11-01 | End: 2020-11-02 | Stop reason: HOSPADM

## 2020-11-01 RX ORDER — AMLODIPINE BESYLATE 5 MG/1
5 TABLET ORAL DAILY
Status: DISCONTINUED | OUTPATIENT
Start: 2020-11-01 | End: 2020-11-02 | Stop reason: HOSPADM

## 2020-11-01 RX ORDER — MORPHINE SULFATE 2 MG/ML
2 INJECTION, SOLUTION INTRAMUSCULAR; INTRAVENOUS EVERY 4 HOURS PRN
Status: DISCONTINUED | OUTPATIENT
Start: 2020-11-01 | End: 2020-11-01

## 2020-11-01 RX ORDER — SODIUM CHLORIDE 9 MG/ML
INJECTION, SOLUTION INTRAVENOUS CONTINUOUS
Status: DISCONTINUED | OUTPATIENT
Start: 2020-11-01 | End: 2020-11-01

## 2020-11-01 RX ORDER — POLYETHYLENE GLYCOL 3350 17 G/17G
17 POWDER, FOR SOLUTION ORAL DAILY PRN
Status: DISCONTINUED | OUTPATIENT
Start: 2020-11-01 | End: 2020-11-02 | Stop reason: HOSPADM

## 2020-11-01 RX ORDER — SODIUM CHLORIDE, SODIUM LACTATE, POTASSIUM CHLORIDE, CALCIUM CHLORIDE 600; 310; 30; 20 MG/100ML; MG/100ML; MG/100ML; MG/100ML
INJECTION, SOLUTION INTRAVENOUS CONTINUOUS
Status: DISCONTINUED | OUTPATIENT
Start: 2020-11-01 | End: 2020-11-02

## 2020-11-01 RX ORDER — MORPHINE SULFATE 2 MG/ML
2 INJECTION, SOLUTION INTRAMUSCULAR; INTRAVENOUS ONCE
Status: COMPLETED | OUTPATIENT
Start: 2020-11-01 | End: 2020-11-01

## 2020-11-01 RX ADMIN — PANTOPRAZOLE SODIUM 40 MG: 40 INJECTION, POWDER, FOR SOLUTION INTRAVENOUS at 01:56

## 2020-11-01 RX ADMIN — ONDANSETRON 4 MG: 2 INJECTION INTRAMUSCULAR; INTRAVENOUS at 01:56

## 2020-11-01 RX ADMIN — LORAZEPAM 0.5 MG: 2 INJECTION INTRAMUSCULAR; INTRAVENOUS at 20:13

## 2020-11-01 RX ADMIN — SODIUM CHLORIDE 1000 ML: 9 INJECTION, SOLUTION INTRAVENOUS at 01:56

## 2020-11-01 RX ADMIN — KETOROLAC TROMETHAMINE 15 MG: 30 INJECTION, SOLUTION INTRAMUSCULAR; INTRAVENOUS at 01:56

## 2020-11-01 RX ADMIN — SODIUM CHLORIDE: 9 INJECTION, SOLUTION INTRAVENOUS at 04:33

## 2020-11-01 RX ADMIN — SODIUM CHLORIDE, PRESERVATIVE FREE 10 ML: 5 INJECTION INTRAVENOUS at 08:32

## 2020-11-01 RX ADMIN — IOPAMIDOL 75 ML: 755 INJECTION, SOLUTION INTRAVENOUS at 01:52

## 2020-11-01 RX ADMIN — LISINOPRIL 10 MG: 10 TABLET ORAL at 08:30

## 2020-11-01 RX ADMIN — MORPHINE SULFATE 2 MG: 2 INJECTION, SOLUTION INTRAMUSCULAR; INTRAVENOUS at 08:30

## 2020-11-01 RX ADMIN — MORPHINE SULFATE 2 MG: 2 INJECTION, SOLUTION INTRAMUSCULAR; INTRAVENOUS at 04:59

## 2020-11-01 RX ADMIN — SODIUM CHLORIDE 1000 ML: 9 INJECTION, SOLUTION INTRAVENOUS at 03:00

## 2020-11-01 RX ADMIN — LORAZEPAM 0.5 MG: 2 INJECTION INTRAMUSCULAR; INTRAVENOUS at 09:05

## 2020-11-01 RX ADMIN — HYDROXYCHLOROQUINE SULFATE 400 MG: 200 TABLET ORAL at 20:07

## 2020-11-01 RX ADMIN — SODIUM CHLORIDE: 9 INJECTION, SOLUTION INTRAVENOUS at 09:06

## 2020-11-01 RX ADMIN — MORPHINE SULFATE 4 MG: 4 INJECTION, SOLUTION INTRAMUSCULAR; INTRAVENOUS at 12:34

## 2020-11-01 RX ADMIN — SODIUM CHLORIDE, POTASSIUM CHLORIDE, SODIUM LACTATE AND CALCIUM CHLORIDE: 600; 310; 30; 20 INJECTION, SOLUTION INTRAVENOUS at 21:12

## 2020-11-01 RX ADMIN — MORPHINE SULFATE 4 MG: 4 INJECTION, SOLUTION INTRAMUSCULAR; INTRAVENOUS at 21:16

## 2020-11-01 RX ADMIN — SODIUM CHLORIDE, POTASSIUM CHLORIDE, SODIUM LACTATE AND CALCIUM CHLORIDE: 600; 310; 30; 20 INJECTION, SOLUTION INTRAVENOUS at 14:01

## 2020-11-01 RX ADMIN — AMLODIPINE BESYLATE 5 MG: 5 TABLET ORAL at 08:35

## 2020-11-01 RX ADMIN — POTASSIUM CHLORIDE 40 MEQ: 20 TABLET, EXTENDED RELEASE ORAL at 16:23

## 2020-11-01 RX ADMIN — PANTOPRAZOLE SODIUM 40 MG: 40 TABLET, DELAYED RELEASE ORAL at 06:02

## 2020-11-01 RX ADMIN — PREDNISONE 3 MG: 1 TABLET ORAL at 08:31

## 2020-11-01 RX ADMIN — MORPHINE SULFATE 4 MG: 4 INJECTION, SOLUTION INTRAMUSCULAR; INTRAVENOUS at 16:36

## 2020-11-01 RX ADMIN — ENOXAPARIN SODIUM 40 MG: 40 INJECTION SUBCUTANEOUS at 08:30

## 2020-11-01 RX ADMIN — LIDOCAINE HYDROCHLORIDE: 20 SOLUTION ORAL; TOPICAL at 01:56

## 2020-11-01 RX ADMIN — MORPHINE SULFATE 4 MG: 4 INJECTION, SOLUTION INTRAMUSCULAR; INTRAVENOUS at 03:36

## 2020-11-01 RX ADMIN — FLUOXETINE 60 MG: 20 CAPSULE ORAL at 20:07

## 2020-11-01 RX ADMIN — CYCLOBENZAPRINE 10 MG: 10 TABLET, FILM COATED ORAL at 21:12

## 2020-11-01 RX ADMIN — METHOTREXATE 10 MG: 2.5 TABLET ORAL at 21:12

## 2020-11-01 RX ADMIN — SODIUM CHLORIDE, PRESERVATIVE FREE 10 ML: 5 INJECTION INTRAVENOUS at 08:31

## 2020-11-01 RX ADMIN — SODIUM CHLORIDE, PRESERVATIVE FREE 10 ML: 5 INJECTION INTRAVENOUS at 20:13

## 2020-11-01 ASSESSMENT — ENCOUNTER SYMPTOMS
WHEEZING: 0
SORE THROAT: 0
NAUSEA: 1
SHORTNESS OF BREATH: 0
COUGH: 0
EYE DISCHARGE: 0
ABDOMINAL PAIN: 1
VOMITING: 0
DIARRHEA: 0
CONSTIPATION: 0
BLOOD IN STOOL: 0
SINUS PRESSURE: 0

## 2020-11-01 ASSESSMENT — PAIN DESCRIPTION - PROGRESSION: CLINICAL_PROGRESSION: GRADUALLY IMPROVING

## 2020-11-01 ASSESSMENT — PAIN SCALES - GENERAL
PAINLEVEL_OUTOF10: 2
PAINLEVEL_OUTOF10: 7
PAINLEVEL_OUTOF10: 8
PAINLEVEL_OUTOF10: 6
PAINLEVEL_OUTOF10: 6
PAINLEVEL_OUTOF10: 7
PAINLEVEL_OUTOF10: 2
PAINLEVEL_OUTOF10: 0
PAINLEVEL_OUTOF10: 1
PAINLEVEL_OUTOF10: 4
PAINLEVEL_OUTOF10: 8
PAINLEVEL_OUTOF10: 0
PAINLEVEL_OUTOF10: 6

## 2020-11-01 ASSESSMENT — PAIN DESCRIPTION - FREQUENCY: FREQUENCY: INTERMITTENT

## 2020-11-01 ASSESSMENT — PAIN DESCRIPTION - LOCATION
LOCATION: ABDOMEN
LOCATION: ABDOMEN

## 2020-11-01 ASSESSMENT — PAIN DESCRIPTION - PAIN TYPE
TYPE: ACUTE PAIN
TYPE: ACUTE PAIN

## 2020-11-01 ASSESSMENT — PAIN - FUNCTIONAL ASSESSMENT: PAIN_FUNCTIONAL_ASSESSMENT: ACTIVITIES ARE NOT PREVENTED

## 2020-11-01 ASSESSMENT — PAIN DESCRIPTION - DESCRIPTORS: DESCRIPTORS: ACHING;DISCOMFORT;SORE

## 2020-11-01 ASSESSMENT — PAIN DESCRIPTION - ONSET: ONSET: ON-GOING

## 2020-11-01 ASSESSMENT — PAIN DESCRIPTION - ORIENTATION: ORIENTATION: UPPER;MID

## 2020-11-01 NOTE — PROGRESS NOTES
Marci Iyer was ordered Artificial Tears oph drops which is currently on manufacture backorder and unavailable from the pharmacy. The order has been discontinued. If the patient has their own supply of this medication. They may bring it in for inpatient use. Please bring it down to the pharmacy for verification and labeling for inpatient use.     Thank you

## 2020-11-01 NOTE — PROGRESS NOTES
Patient seen and examined this morning. Patient states the pain has improved with the increase dosage of morphine. Patient denies any chest pain or SOB however is feeling anxious. Physical exam changed from previous, still having tenderness with palpation of abdomen. Plan is mostly unchanged however will add ativan for anxiety.

## 2020-11-01 NOTE — ED NOTES
SBAR faxed to [facility] for confirmation of receipt.      Mello Roach RN  11/01/20 1707

## 2020-11-01 NOTE — H&P
[address]  Resident History and Physical      CHIEF COMPLAINT:    Chief Complaint   Patient presents with    Abdominal Pain     mid abdomen    Nausea        History of Present Illness:   Brandon Anthony  is a 53 y.o. female patient of Catherine Bowles MD  with a pertinent PMHx of hypertension, dermatomyositis, sicca syndrome, depression  who presented to the ER from home with chief complaint of abdominal pain and nausea. Patient states that this abdominal pain started yesterday morning, getting worse throughout the day,e specially after eating a sausage sandwich. Pain is in mid upper abdomen, sometimes radiating to the back and epigastrium. Associated with nausea without vomiting. No diarrhea or constipation, melena or hematochezia. Very intense pain 8/10, which has improved currently in ER from the pain meds, down to 3-4, making patient feel very comfortable for now. Patient denies any prior hx of similar pain. Has been on steroid taper for weeks. Following with dermatology, rheumatology lately for dermatomyositis and having skin biopsy. Patient also reports drinking alcohol almost every day, about 3-5 beers a day for the past year. Before that, used to drink at least 3 glasses of wine nightly for years. Last drink was on Friday, had 3 beers and one shot. Patient was surprised that this alcohol used might have induced the mild pancreatitis as she never thought she was drinking a lot. In ER: hemodynamically stable, hypertensive, but did not take the antihypertensive today. Lab wise, remarkable for hyponatremia 126, leukocytosis 17. Ethanol level <10. Lipase normal. CT abdomen showed mild pancreatitis. Patient was given 2 L bolus of fluids, GI cocktail, pain meds and was admitted for further management. ROS:   Review of Systems   Constitutional: Negative for chills, fever and unexpected weight change. HENT: Negative for congestion, sinus pressure and sore throat. Eyes: Negative for discharge and visual disturbance. Respiratory: Negative for cough, shortness of breath and wheezing. Cardiovascular: Negative for chest pain and leg swelling. Gastrointestinal: Positive for abdominal pain (mid upper abdomen, epigastric) and nausea. Negative for blood in stool, constipation, diarrhea and vomiting. Endocrine: Negative for cold intolerance, heat intolerance and polyuria. Genitourinary: Negative for dysuria and hematuria. Musculoskeletal: Positive for arthralgias (chronic). Negative for neck pain and neck stiffness. Skin: Negative for rash (not currently) and wound. Neurological: Negative for weakness, numbness and headaches. Psychiatric/Behavioral: Negative for agitation and confusion. Past Medical History:   Diagnosis Date    Anxiety     Depression     Herniated disc     Hypertension     Pneumonia     Raynaud's disease     TMJ (dislocation of temporomandibular joint)          Past Surgical History:   Procedure Laterality Date    HYSTERECTOMY      OVARIAN CYST REMOVAL      benign mass removal    SKIN BIOPSY Left     left hand       Medications Prior to Admission:    Prior to Admission medications    Medication Sig Start Date End Date Taking?  Authorizing Provider   busPIRone (BUSPAR) 5 MG tablet Take 7.5 mg by mouth 2 times daily   Yes Historical Provider, MD   amLODIPine-benazepril (LOTREL) 5-10 MG per capsule take 1 capsule by mouth once daily 8/20/20  Yes Benedict Plaza MD   omeprazole (PRILOSEC) 20 MG delayed release capsule Take 1 capsule by mouth Daily 8/18/20  Yes Benedict Plaza MD   predniSONE (DELTASONE) 5 MG tablet Take 1 tablet by mouth daily  Patient taking differently: Take 3 mg by mouth daily  5/28/20  Yes Benedict Plaza MD   cyclobenzaprine (FLEXERIL) 10 MG tablet Take 1 tablet by mouth nightly as needed for Muscle spasms 5/12/20  Yes Benedict Plaza MD   FLUoxetine (PROZAC) 20 MG capsule Take 1 capsule by mouth every morning and 2 capsules every evening. Patient taking differently: 60 mg daily Takes 3 20mg tablets 5/12/20  Yes Nisreen Casey MD   Multiple Vitamins-Minerals (CENTRA-JACOBY PO) Take by mouth daily   Yes Historical Provider, MD   Lifitegrast [place]) 5 % SOLN Apply to eye 2 times daily    Yes Historical Provider, MD   methotrexate (RHEUMATREX) 2.5 MG chemo tablet Take 2.5 mg by mouth once a week 4 pills weekly   Yes Historical Provider, MD   Flaxseed, Linseed, (BL FLAX SEED OIL PO) Take by mouth daily   Yes Historical Provider, MD   hydroxychloroquine (PLAQUENIL) 200 MG tablet Take 400 mg by mouth daily    Yes Historical Provider, MD   Omega-3 Fatty Acids (FISH OIL) 1000 MG CAPS Take 3,000 mg by mouth 3 times daily   Yes Historical Provider, MD   vitamin D (CHOLECALCIFEROL) 1000 UNIT TABS tablet Take 1,000 Units by mouth every morning    Yes Historical Provider, MD   Multiple Vitamins-Minerals (HAIR/SKIN/NAILS PO) Take 1 tablet by mouth every morning    Yes Historical Provider, MD   B Complex Vitamins (VITAMIN B COMPLEX PO) Take 1 tablet by mouth every morning    Yes Historical Provider, MD   CALCIUM-VITAMIN D PO Take 1 tablet by mouth every morning    Yes Historical Provider, MD   hypromellose (ARTIFICIAL TEARS) 0.4 % SOLN ophthalmic solution Place 1 drop into both eyes every 4 hours as needed (dry eyes) 2/28/19   Nisreen Casey MD        Allergies:   Penicillins; Bactrim [sulfamethoxazole-trimethoprim]; Levaquin [levofloxacin]; and Macrobid [nitrofurantoin]    Social History:    reports that she has been smoking cigarettes. She has a 15.00 pack-year smoking history. She has never used smokeless tobacco. She reports current alcohol use. She reports that she does not use drugs. Family History:   family history includes Heart Disease in her father.     PHYSICAL EXAM:    Vitals:  BP (!) 163/90   Pulse 96   Temp 98 °F (36.7 °C) (Oral)   Resp 16   Ht 5' 5\" (1.651 m)   Wt 150 lb (68 kg)   SpO2 98%   BMI 24.96 kg/m²     Physical Exam  Vitals signs reviewed. Constitutional:       General: She is not in acute distress. HENT:      Head: Normocephalic and atraumatic. Nose: No congestion. Mouth/Throat:      Pharynx: No oropharyngeal exudate or posterior oropharyngeal erythema. Eyes:      General: No scleral icterus. Conjunctiva/sclera: Conjunctivae normal.      Pupils: Pupils are equal, round, and reactive to light. Neck:      Musculoskeletal: Normal range of motion and neck supple. Cardiovascular:      Rate and Rhythm: Normal rate and regular rhythm. Heart sounds: Normal heart sounds. No murmur. Pulmonary:      Effort: Pulmonary effort is normal. No respiratory distress. Breath sounds: Normal breath sounds. No wheezing or rhonchi. Abdominal:      General: Bowel sounds are normal. There is no distension. Palpations: Abdomen is soft. Tenderness: There is abdominal tenderness (mid upper abdomen, mildy tender. epigastric mild tenderness as well). There is no guarding or rebound. Musculoskeletal:      Right lower leg: No edema. Left lower leg: No edema. Lymphadenopathy:      Cervical: No cervical adenopathy. Skin:     General: Skin is warm. Findings: No rash. Neurological:      General: No focal deficit present. Mental Status: She is alert and oriented to person, place, and time. Cranial Nerves: No cranial nerve deficit. Motor: No weakness.    Psychiatric:         Mood and Affect: Mood normal.         Behavior: Behavior normal.         LABS:  Recent Results (from the past 24 hour(s))   Comprehensive Metabolic Panel    Collection Time: 11/01/20  1:28 AM   Result Value Ref Range    Sodium 126 (L) 132 - 146 mmol/L    Potassium 3.6 3.5 - 5.0 mmol/L    Chloride 92 (L) 98 - 107 mmol/L    CO2 24 22 - 29 mmol/L    Anion Gap 10 7 - 16 mmol/L    Glucose 135 (H) 74 - 99 mg/dL    BUN 4 (L) 6 - 20 mg/dL    CREATININE 0.5 0.5 - 1.0 mg/dL    GFR Non-African American >60 >=60 mL/min/1.73    GFR African American >60     Calcium 9.4 8.6 - 10.2 mg/dL    Total Protein 7.0 6.4 - 8.3 g/dL    Alb 4.1 3.5 - 5.2 g/dL    Total Bilirubin 0.3 0.0 - 1.2 mg/dL    Alkaline Phosphatase 90 35 - 104 U/L    ALT 18 0 - 32 U/L    AST 25 0 - 31 U/L   CBC Auto Differential    Collection Time: 11/01/20  1:28 AM   Result Value Ref Range    WBC 17.0 (H) 4.5 - 11.5 E9/L    RBC 3.94 3.50 - 5.50 E12/L    Hemoglobin 12.9 11.5 - 15.5 g/dL    Hematocrit 37.1 34.0 - 48.0 %    MCV 94.2 80.0 - 99.9 fL    MCH 32.7 26.0 - 35.0 pg    MCHC 34.8 (H) 32.0 - 34.5 %    RDW 12.5 11.5 - 15.0 fL    Platelets 575 498 - 313 E9/L    MPV 9.1 7.0 - 12.0 fL    Neutrophils % 81.5 (H) 43.0 - 80.0 %    Immature Granulocytes % 0.3 0.0 - 5.0 %    Lymphocytes % 9.0 (L) 20.0 - 42.0 %    Monocytes % 8.4 2.0 - 12.0 %    Eosinophils % 0.6 0.0 - 6.0 %    Basophils % 0.2 0.0 - 2.0 %    Neutrophils Absolute 13.88 (H) 1.80 - 7.30 E9/L    Immature Granulocytes # 0.05 E9/L    Lymphocytes Absolute 1.53 1.50 - 4.00 E9/L    Monocytes Absolute 1.43 (H) 0.10 - 0.95 E9/L    Eosinophils Absolute 0.11 0.05 - 0.50 E9/L    Basophils Absolute 0.03 0.00 - 0.20 E9/L   Lipase    Collection Time: 11/01/20  1:28 AM   Result Value Ref Range    Lipase 43 13 - 60 U/L   Amylase    Collection Time: 11/01/20  1:28 AM   Result Value Ref Range    Amylase 33 20 - 100 U/L   Troponin    Collection Time: 11/01/20  1:28 AM   Result Value Ref Range    Troponin <0.01 0.00 - 0.03 ng/mL   Urinalysis with Microscopic    Collection Time: 11/01/20  1:28 AM   Result Value Ref Range    Color, UA Yellow Straw/Yellow    Clarity, UA Clear Clear    Glucose, Ur Negative Negative mg/dL    Bilirubin Urine Negative Negative    Ketones, Urine Negative Negative mg/dL    Specific Gravity, UA 1.010 1.005 - 1.030    Blood, Urine SMALL (A) Negative    pH, UA 8.0 5.0 - 9.0    Protein, UA Negative Negative mg/dL    Urobilinogen, Urine 0.2 <2.0 E.U./dL    Nitrite, Urine Negative Negative    Leukocyte Esterase, Urine Negative Negative    WBC, UA 0-1 0 - 5 /HPF    RBC, UA 1-3 0 - 2 /HPF    Epithelial Cells, UA RARE /HPF    Bacteria, UA NONE SEEN None Seen /HPF   POC Pregnancy Urine Qual    Collection Time: 11/01/20  1:40 AM   Result Value Ref Range    HCG, Urine, POC Negative Negative    Lot Number [id number]     Positive QC Pass/Fail Pass     Negative QC Pass/Fail Pass    Ethanol    Collection Time: 11/01/20  2:11 AM   Result Value Ref Range    Ethanol Lvl <10 mg/dL   EKG 12 Lead    Collection Time: 11/01/20  3:09 AM   Result Value Ref Range    Ventricular Rate 93 BPM    Atrial Rate 93 BPM    P-R Interval 170 ms    QRS Duration 86 ms    Q-T Interval 412 ms    QTc Calculation (Bazett) 512 ms    P Axis 48 degrees    R Axis 14 degrees    T Axis 27 degrees       XR CHEST (2 VW)   Final Result   Focal patchy infiltrate versus nodule in right mid lung. Consider CT of this   does not resolve in a timely manner. CT ABDOMEN PELVIS W IV CONTRAST Additional Contrast? None   Final Result   Mild peripancreatic reticulation which may indicate underlying acute   pancreatitis. Recommend correlation with clinical/laboratory analysis. Chronic fracture deformities involving the lateral right 5th and 6th ribs as   well as chronic anterior compression of the L3 vertebral body. ASSESSMENT/PLAN:      Active Hospital Problems    Diagnosis Date Noted    Acute pancreatitis without infection or necrosis [K85.90] 11/01/2020    Hyponatremia [E87.1] 11/01/2020    Depression [F32.9] 12/28/2015    Leukocytosis [D72.829] 12/28/2015    HTN (hypertension) [I10] 04/27/2015         Acute pancreatitis without infection or necrosis  First episode. Denies prior episodes of pancreatitis. Patient does drink alcohol on daily basis for years. No reported binge drinking. Lately on steroid tapes for months.    - consult gastroenterology so patient can follow up with one on discharge as well, since it seems like she needs to be on steroid for her rheumatologic issues. Will need GI input  - /hr  - Npo diet, advance as tolerated  - antinausea meds  - morphine for pain control  - monitor labs    Hyponatremia  Na 127 on admission. Denies any gi losses. - check urine electrolytes, osmolality  - monitor BMP  - continue with fluids    Leukocytosis  Likely steroid induced. No fever, no other symptoms. UA negative. Will check procalcitonin as chest XR mentioned infiltrate. Pt asymptomatic, lungs CTA. Hypertension   Continue lisinopril, amlodipine    Continue other home meds.     DVT ppx: Lovenox  GI ppx: Protonix  Code Status: Full        Case discussed with attending on call Dr. Michelle Arellano MD  Family Medicine Resident Physician   11/1/2020

## 2020-11-01 NOTE — PROGRESS NOTES
Patient oriented to room, medications reviewed, instructed to use call light if she has any needs. 24 hour chart check complete.   Alice Ventura RN negative...

## 2020-11-01 NOTE — PROGRESS NOTES
Messaged Dr. Chelsea Kessler, \"Patient's sodium last night was 126. Do you still want her fluids to (0.9NS) to run at 200 ml/hr? \"  Dr. Chelsea Kessler responded, \"Yes. \"

## 2020-11-01 NOTE — ED PROVIDER NOTES
ED Attending  CC: Christina    Department of Emergency Medicine   ED  Provider Note  Admit Date/RoomTime: 11/1/2020  1:06 AM  ED Room: 23/23  MRN: [id number]  Chief Complaint       Abdominal Pain (mid abdomen) and Nausea    History of Present Illness   Source of history provided by:  patient. History/Exam Limitations: none. Bita Parra is a 53 y.o. old female who has a past medical history of:   Past Medical History:   Diagnosis Date    Anxiety     Depression     Herniated disc     Hypertension     Pneumonia     Raynaud's disease     TMJ (dislocation of temporomandibular joint)     presents to the emergency department by private vehicle, for complaints of sudden onset aching, cramping, pressure pain in the epigastrium without radiation which began at 9 am this am.   There has been no similar episodes in the past.  Since onset the symptoms have been persistent. The pain is associated with nausea. The pain is aggravated by pressure and relieved by nothing. There has been NO back pain, chills, cloudy urine, constipation, diarrhea, dysuria, headache, hematuria, urinary frequency, urinary incontinence, urinary urgency, vaginal discharge, vaginal itching or vomiting. Patient states the only abdominal surgery she has had in the past is a left ovarian cystectomy as well as a hysterectomy. Patient states she has been recently taking more ibuprofen. Patient states the last thing she ate was a sausage sandwich and then her symptoms began. GYN History: Hysterectomy. STD History: no history of PID, STD's. No LMP recorded. Patient has had a hysterectomy. Current Pregnancy: No.     Birth Control: Status post hysterectomy. Gravid Status: No obstetric history on file. .  ROS   Pertinent positives and negatives are stated within HPI, all other systems reviewed and are negative.     Past Surgical History:   Procedure Laterality Date    HYSTERECTOMY      OVARIAN CYST REMOVAL      benign mass removal    SKIN BIOPSY Left     left hand   Social History:  reports that she has been smoking cigarettes. She has a 15.00 pack-year smoking history. She has never used smokeless tobacco. She reports current alcohol use. She reports that she does not use drugs. Family History: family history includes Heart Disease in her father. Allergies: Penicillins; Bactrim [sulfamethoxazole-trimethoprim]; Levaquin [levofloxacin]; and Macrobid [nitrofurantoin]    Physical Exam           ED Triage Vitals [11/01/20 0111]   BP Temp Temp Source Pulse Resp SpO2 Height Weight   (!) 170/99 98.4 °F (36.9 °C) Temporal 104 18 99 % 5' 5\" (1.651 m) 150 lb (68 kg)      Oxygen Saturation Interpretation: Normal.    · General Appearance/Constitutional:  Alert, development consistent with age. · HEENT:  NC/NT. PERRLA. Airway patent. · Neck:  Supple. No lymphadenopathy. · Respiratory:  No retractions. Lungs Clear to auscultation and breath sounds equal.  · CV:  Regular rate and rhythm. · GI:  General Appearance: normal.         Bowel sounds: normal bowel sounds. Distension:  None. Tenderness: No abdominal tenderness. Liver: non-tender. Spleen:  non-tender. Pulsatile Mass: absent. Hernia:  no inguinal or femoral hernias noted. · Back: CVA Tenderness: No.  · Integument:  Normal turgor. Warm, dry, without visible rash, unless noted elsewhere. · Lymphatics: No edema, cap.refill <3sec. · Neurological:  Orientation age-appropriate. Motor functions intact.     Lab / Imaging Results   (All laboratory and radiology results have been personally reviewed by myself)  Labs:  Results for orders placed or performed during the hospital encounter of 11/01/20   Comprehensive Metabolic Panel   Result Value Ref Range    Sodium 126 (L) 132 - 146 mmol/L    Potassium 3.6 3.5 - 5.0 mmol/L    Chloride 92 (L) 98 - 107 mmol/L    CO2 24 22 - 29 mmol/L    Anion Gap 10 7 - 16 mmol/L    Glucose 135 (H) 74 - 99 mg/dL    BUN 4 (L) 6 - 20 mg/dL    CREATININE 0.5 0.5 - 1.0 mg/dL    GFR Non-African American >60 >=60 mL/min/1.73    GFR African American >60     Calcium 9.4 8.6 - 10.2 mg/dL    Total Protein 7.0 6.4 - 8.3 g/dL    Alb 4.1 3.5 - 5.2 g/dL    Total Bilirubin 0.3 0.0 - 1.2 mg/dL    Alkaline Phosphatase 90 35 - 104 U/L    ALT 18 0 - 32 U/L    AST 25 0 - 31 U/L   CBC Auto Differential   Result Value Ref Range    WBC 17.0 (H) 4.5 - 11.5 E9/L    RBC 3.94 3.50 - 5.50 E12/L    Hemoglobin 12.9 11.5 - 15.5 g/dL    Hematocrit 37.1 34.0 - 48.0 %    MCV 94.2 80.0 - 99.9 fL    MCH 32.7 26.0 - 35.0 pg    MCHC 34.8 (H) 32.0 - 34.5 %    RDW 12.5 11.5 - 15.0 fL    Platelets 188 039 - 029 E9/L    MPV 9.1 7.0 - 12.0 fL    Neutrophils % 81.5 (H) 43.0 - 80.0 %    Immature Granulocytes % 0.3 0.0 - 5.0 %    Lymphocytes % 9.0 (L) 20.0 - 42.0 %    Monocytes % 8.4 2.0 - 12.0 %    Eosinophils % 0.6 0.0 - 6.0 %    Basophils % 0.2 0.0 - 2.0 %    Neutrophils Absolute 13.88 (H) 1.80 - 7.30 E9/L    Immature Granulocytes # 0.05 E9/L    Lymphocytes Absolute 1.53 1.50 - 4.00 E9/L    Monocytes Absolute 1.43 (H) 0.10 - 0.95 E9/L    Eosinophils Absolute 0.11 0.05 - 0.50 E9/L    Basophils Absolute 0.03 0.00 - 0.20 E9/L   Lipase   Result Value Ref Range    Lipase 43 13 - 60 U/L   Amylase   Result Value Ref Range    Amylase 33 20 - 100 U/L   Troponin   Result Value Ref Range    Troponin <0.01 0.00 - 0.03 ng/mL   Urinalysis with Microscopic   Result Value Ref Range    Color, UA Yellow Straw/Yellow    Clarity, UA Clear Clear    Glucose, Ur Negative Negative mg/dL    Bilirubin Urine Negative Negative    Ketones, Urine Negative Negative mg/dL    Specific Gravity, UA 1.010 1.005 - 1.030    Blood, Urine SMALL (A) Negative    pH, UA 8.0 5.0 - 9.0    Protein, UA Negative Negative mg/dL    Urobilinogen, Urine 0.2 <2.0 E.U./dL    Nitrite, Urine Negative Negative    Leukocyte Esterase, Urine Negative Negative    WBC, UA 0-1 0 - 5 /HPF    RBC, UA 1-3 0 - 2 /HPF Epithelial Cells, UA RARE /HPF    Bacteria, UA NONE SEEN None Seen /HPF   Ethanol   Result Value Ref Range    Ethanol Lvl <10 mg/dL   POC Pregnancy Urine Qual   Result Value Ref Range    HCG, Urine, POC Negative Negative    Lot Number [id number]     Positive QC Pass/Fail Pass     Negative QC Pass/Fail Pass    EKG 12 Lead   Result Value Ref Range    Ventricular Rate 93 BPM    Atrial Rate 93 BPM    P-R Interval 170 ms    QRS Duration 86 ms    Q-T Interval 412 ms    QTc Calculation (Bazett) 512 ms    P Axis 48 degrees    R Axis 14 degrees    T Axis 27 degrees     Imaging: All Radiology results interpreted by Radiologist unless otherwise noted. XR CHEST (2 VW)   Final Result   Focal patchy infiltrate versus nodule in right mid lung. Consider CT of this   does not resolve in a timely manner. CT ABDOMEN PELVIS W IV CONTRAST Additional Contrast? None   Final Result   Mild peripancreatic reticulation which may indicate underlying acute   pancreatitis. Recommend correlation with clinical/laboratory analysis. Chronic fracture deformities involving the lateral right 5th and 6th ribs as   well as chronic anterior compression of the L3 vertebral body.            ED Course / Medical Decision Making     Medications   0.9 % sodium chloride bolus (1,000 mLs Intravenous New Bag 11/1/20 0300)   morphine sulfate (PF) injection 4 mg (has no administration in time range)   0.9 % sodium chloride bolus (0 mLs Intravenous Stopped 11/1/20 0215)   ketorolac (TORADOL) injection 15 mg (15 mg Intravenous Given 11/1/20 0156)   ondansetron (ZOFRAN) injection 4 mg (4 mg Intravenous Given 11/1/20 0156)   aluminum & magnesium hydroxide-simethicone (MAALOX) 30 mL, lidocaine viscous hcl (XYLOCAINE) 5 mL (GI COCKTAIL) ( Oral Given 11/1/20 0156)   pantoprazole (PROTONIX) injection 40 mg (40 mg Intravenous Given 11/1/20 0156)   iopamidol (ISOVUE-370) 76 % injection 75 mL (75 mLs Intravenous Given 11/1/20 0152)         EKG #1:  Interpreted by emergency department physician unless otherwise noted. Time:  0309    Rate: 69  Rhythm: Sinus. Interpretation: normal EKG, normal sinus rhythm. Re-examination:  11/1/20       Time: Patient states that she is feeling much better after all the medications, awaiting CT scan of abdomen and pelvis. 0300 spoke to the patient due to her being hyponatremic and the results being indicative of acute pancreatitis. Patient states she is a daily beer drinker of at least 3-4 beers for the last year and 1/2 to 2 years. Consults:   IP CONSULT TO FAMILY MEDICINE    Procedures:   none    MDM:   Patient is a 31-year-old female that presented to the emergency department with epigastric pain waxing and waning for the last day without any relief with over-the-counter medications. Patient had a full work-up including labs, CT scan of the abdomen and pelvis with contrast.  Patient was noted to have an elevated white blood cell count of 17 and was hyponatremic at 126. Ethanol level added. Ethanol level found to be negative. Patient CT scan of the abdomen and pelvis revealed acute pancreatitis. Patient does admit to being a daily drinker for the last year and a half. Patient was educated that she has acute pancreatitis due to her drinking habits. Patient was educated that she needs lots of fluids and pain medication which will help relieve her symptoms. Patient was educated she needs to be admitted to the hospital for this. She was advised of the risk and benefits voiced understanding and agreed to the admission. Spoke to the family medicine resident who agreed to the admission. Admission place. Patient is comfortably lying in bed resting comfortably waiting for admission to the floor. Counseling: The emergency provider has spoken with the patient and discussed todays results, in addition to providing specific details for the plan of care and counseling regarding the diagnosis and prognosis.   Questions are answered at this time and they are agreeable with the plan. Assessment      1. Alcohol-induced acute pancreatitis without infection or necrosis    2. Abdominal pain, epigastric    3. Leukocytosis, unspecified type    4. Hyponatremia      Plan   Discharge to home  Patient condition is stable    New Medications     New Prescriptions    No medications on file     Electronically signed by Luis Gutierrez PA-C   DD: 11/1/20  **This report was transcribed using voice recognition software. Every effort was made to ensure accuracy; however, inadvertent computerized transcription errors may be present.   END OF ED PROVIDER NOTE       Luis Gutierrez PA-C  11/01/20 0067

## 2020-11-01 NOTE — CONSULTS
Gastroenterology Consult Note   CHELSEY Solis with Chyna Salinas M.D. Consult Note        Date of Service: 11/1/2020  Reason for Consult: acute pancreatitis, first time, alcohol use, steroid use  Requesting Physician: Dr. Garcia:  Abdominal pain and nausea    History Obtained From:  Patient, EMR    HISTORY OF PRESENT ILLNESS:       Bessy Valdivia is a 53 y.o. female with significant past medical history of alcohol abuse, HTN, anxiety, depression and raynaud's admitted via ED for abdominal pain and nausea. Pt reports her pain had started yesterday morning around 9 am. Pt states that it progressively got worse. Pt states she thought it was \"gas\" stating she took pepto bismo, tums, walked around and rubbed her abdomen with no relief. Pt states her pain was a 7-8/10 mid and left upper abdomen radiating to her epigastric region and back thus going to ER for evaluation. Pt reports nausea with no vomiting. Pt states she has been on prednisone constantly for 1-2 years, stating her doctor is trying to wean her. Pt states she sometimes coughs up \"dark stuff\". Pt states she has never had an EGD or colon. Pt states she is a daily drinker and drinks 3-4 cups of coffee a day. Pt denies hematochezia, melena, fever, chills or weight loss. Admission labs Alk phos 90; ALT 18; amylase 33; AST 25; bilirubin 0.3; lipase 43; WBC 17; MCHC 34.8; neut 81.5%; lymph 9%; absolute neut 13.88; absolute mono 1.43; sodium 126; chloride 92; BUN 4; ethanol <10. CT abdomen/pelvis W IV contrast- Mild peripancreatic reticulation which may indicate underlying acute pancreatitis. Recommend correlation with clinical/laboratory analysis. Chronic fracture deformities involving the lateral right 5th and 6th ribs as well as chronic anterior compression of the L3 vertebral body. Consultation for acute pancreatitis, first time, alcohol use, steroid use. Currently, pt reports mild abdominal pain left/mid abdomen 2-3/10. Pt denies hematochezia, hematemesis, n/v or melena. Labs today ordered and pending. Past Medical History:        Diagnosis Date    Anxiety     Depression     Herniated disc     Hypertension     Pneumonia     Raynaud's disease     TMJ (dislocation of temporomandibular joint)      Past Surgical History:        Procedure Laterality Date    HYSTERECTOMY      OVARIAN CYST REMOVAL      benign mass removal    SKIN BIOPSY Left     left hand     Current Medications:    Current Facility-Administered Medications: pantoprazole (PROTONIX) tablet 40 mg, 40 mg, Oral, QAM AC  predniSONE (DELTASONE) tablet 3 mg, 3 mg, Oral, Daily  sodium chloride flush 0.9 % injection 10 mL, 10 mL, Intravenous, 2 times per day  sodium chloride flush 0.9 % injection 10 mL, 10 mL, Intravenous, PRN  acetaminophen (TYLENOL) tablet 650 mg, 650 mg, Oral, Q6H PRN **OR** acetaminophen (TYLENOL) suppository 650 mg, 650 mg, Rectal, Q6H PRN  polyethylene glycol (GLYCOLAX) packet 17 g, 17 g, Oral, Daily PRN  enoxaparin (LOVENOX) injection 40 mg, 40 mg, Subcutaneous, Daily  trimethobenzamide (TIGAN) injection 200 mg, 200 mg, Intramuscular, Q6H PRN  nicotine (NICODERM CQ) 14 MG/24HR 1 patch, 1 patch, Transdermal, Daily  0.9 % sodium chloride infusion, , Intravenous, Continuous  amLODIPine (NORVASC) tablet 5 mg, 5 mg, Oral, Daily **AND** lisinopril (PRINIVIL;ZESTRIL) tablet 10 mg, 10 mg, Oral, Daily  FLUoxetine (PROZAC) capsule 60 mg, 60 mg, Oral, Nightly  hydroxychloroquine (PLAQUENIL) tablet 400 mg, 400 mg, Oral, Nightly  morphine (PF) injection 2 mg, 2 mg, Intravenous, Q4H PRN  morphine sulfate (PF) injection 4 mg, 4 mg, Intravenous, Q4H PRN  LORazepam (ATIVAN) injection 0.5 mg, 0.5 mg, Intravenous, BID    Allergies:  Penicillins; Bactrim [sulfamethoxazole-trimethoprim]; Levaquin [levofloxacin]; and Macrobid [nitrofurantoin]    Social History:    Tobacco:  Pt reports current . 5/PPD for 30 yrs  Alcohol:  Pt reports 3-4 beers daily   Illicit Drugs: Pt denies    Family History: Mother- living, HTN, arthritis  Father- , heart disease  Sister (2)- living, HTN  Children (3)- healthy    REVIEW OF SYSTEMS:    Aside from what was mentioned in the [address] and HPI, essentially unremarkable, all others negative. PHYSICAL EXAM:      Vitals:    BP (!) 142/79   Pulse 108   Temp 98.1 °F (36.7 °C) (Oral)   Resp 16   Ht 5' 5\" (1.651 m)   Wt 157 lb (71.2 kg)   SpO2 96%   BMI 26.13 kg/m²       CONSTITUTIONAL:  awake, alert, cooperative, no apparent distress, sitting up in bed and appears stated age  EYES:  pupils equal, round and reactive to light, sclera anicteric and conjunctiva normal  ENT:  normocephalic, oral pharynx with moist mucous membranes  NECK:  supple   HEMATOLOGIC/LYMPHATICS:  no cervical lymphadenopathy and no supraclavicular lymphadenopathy  LUNGS:  No increased work of breathing, good air exchange, clear to auscultation bilaterally.   CARDIOVASCULAR:  Normal apical impulse, regular rate and rhythm, no murmur noted; 2+ pulses; no edema  ABDOMEN:  normal bowel sounds, soft, non-distended, tender to palpation with no guarding or rebound, no masses palpated, no hepatosplenomegally  MUSCULOSKELETAL:  full range of motion noted  motor strength is 5 out of 5 all extremities bilaterally  NEUROLOGIC:  Mental Status Exam:  Level of Alertness:   awake  Orientation:   person, place, time  Motor Exam:  Motor exam is symmetrical 5 out of 5 all extremities bilaterally  SKIN:  normal skin color, texture, turgor    DATA:    CBC with Differential:    Lab Results   Component Value Date    WBC 17.0 2020    RBC 3.94 2020    HGB 12.9 2020    HCT 37.1 2020     2020    MCV 94.2 2020    MCH 32.7 2020    MCHC 34.8 2020    RDW 12.5 2020    BANDSPCT 4 2015    METASPCT 0.9 2019    LYMPHOPCT 9.0 2020    MONOPCT 8.4 2020    BASOPCT 0.2 2020    MONOSABS 1.43 2020    LYMPHSABS 1.53 11/01/2020    EOSABS 0.11 11/01/2020    BASOSABS 0.03 11/01/2020     CMP:    Lab Results   Component Value Date     11/01/2020    K 3.6 11/01/2020    CL 92 11/01/2020    CO2 24 11/01/2020    BUN 4 11/01/2020    CREATININE 0.5 11/01/2020    GFRAA >60 11/01/2020    LABGLOM >60 11/01/2020    GLUCOSE 135 11/01/2020    PROT 7.0 11/01/2020    LABALBU 4.1 11/01/2020    CALCIUM 9.4 11/01/2020    BILITOT 0.3 11/01/2020    ALKPHOS 90 11/01/2020    AST 25 11/01/2020    ALT 18 11/01/2020     Hepatic Function Panel:    Lab Results   Component Value Date    ALKPHOS 90 11/01/2020    ALT 18 11/01/2020    AST 25 11/01/2020    PROT 7.0 11/01/2020    BILITOT 0.3 11/01/2020    BILIDIR 0.3 12/29/2015    IBILI 0.3 12/29/2015    LABALBU 4.1 11/01/2020     PT/INR:    Lab Results   Component Value Date    PROTIME 12.6 12/27/2015    INR 1.2 12/27/2015     PTT:    Lab Results   Component Value Date    APTT 32.2 12/27/2015   [APTT}  Last 3 Troponin:    Lab Results   Component Value Date    TROPONINI <0.01 11/01/2020    TROPONINI <0.01 08/01/2017    TROPONINI <0.01 12/27/2015     TSH:    Lab Results   Component Value Date    TSH 1.250 01/21/2020     VITAMIN B12:   Lab Results   Component Value Date    WVLLJZSM40 390 06/27/2019     FOLATE:    Lab Results   Component Value Date    FOLATE 19.8 06/27/2019       Lab Results   Component Value Date    JUAN NEGATIVE 06/05/2019     No components found for: CHLPL  Lab Results   Component Value Date    TRIG 111 07/16/2020    TRIG 141 03/16/2017    TRIG 90 02/05/2015     Lab Results   Component Value Date    HDL 56 07/16/2020    HDL 50 03/16/2017    HDL 50 02/05/2015     Lab Results   Component Value Date    LDLCALC 132 (H) 07/16/2020    LDLCALC 127 (H) 03/16/2017    LDLCALC 150 (H) 02/05/2015     Lab Results   Component Value Date    LABVLDL 22 07/16/2020    LABVLDL 28 03/16/2017    LABVLDL 18 02/05/2015        Xr Chest (2 Vw)    Result Date: 11/1/2020  EXAMINATION: TWO XRAY VIEWS OF THE CHEST 11/1/2020 2:44 am COMPARISON: 12/05/2019 HISTORY: ORDERING SYSTEM PROVIDED HISTORY: ab pain TECHNOLOGIST PROVIDED HISTORY: Reason for exam:->ab pain FINDINGS: There is some focal increased density in the mid right hemithorax peripherally. This could be some focal airspace disease or nodule in the mid right lung. Left lung is clear. There is no pneumothorax. There is no pleural effusion. There is no cardiomegaly or pulmonary vascular congestion. Focal patchy infiltrate versus nodule in right mid lung. Consider CT of this does not resolve in a timely manner. Ct Abdomen Pelvis W Iv Contrast Additional Contrast? None    Result Date: 11/1/2020  EXAMINATION: CT OF THE ABDOMEN AND PELVIS WITH CONTRAST 11/1/2020 1:47 am TECHNIQUE: CT of the abdomen and pelvis was performed with the administration of intravenous contrast. Multiplanar reformatted images are provided for review. Dose modulation, iterative reconstruction, and/or weight based adjustment of the mA/kV was utilized to reduce the radiation dose to as low as reasonably achievable. COMPARISON: None. HISTORY: ORDERING SYSTEM PROVIDED HISTORY: epigastric pain, bloating TECHNOLOGIST PROVIDED HISTORY: Additional Contrast?->None Reason for exam:->epigastric pain, bloating FINDINGS: Lower Chest: Visualized portions of the lower chest is within normal limits. Organs: Mild peripancreatic reticulation is noted adjacent to the head and proximal body which could indicate an inflammatory process. This could be concerning for underlying acute pancreatitis. Recommend correlation with clinical/laboratory analysis. The liver, gallbladder, spleen, and bilateral adrenal glands are within normal limits. The bilateral kidneys are within normal limits. No renal cysts or masses are present. No hydronephrosis or hydroureter. GI/Bowel: The small and large bowel demonstrate normal caliber and appearance. A normal-appearing appendix is identified. Pelvis:  The urinary bladder is partially filled and is unremarkable in appearance. Peritoneum/Retroperitoneum: No free fluid or free gas in the abdomen or pelvis. Bones/Soft Tissues: Chronic fracture deformity involving the lateral right 5th and 6th ribs. Chronic anterior wedging of L3 vertebral body with about 50% anterior height loss. Mild peripancreatic reticulation which may indicate underlying acute pancreatitis. Recommend correlation with clinical/laboratory analysis. Chronic fracture deformities involving the lateral right 5th and 6th ribs as well as chronic anterior compression of the L3 vertebral body. IMPRESSION:  · Acute pancreatitis   · Abdominal pain LUQ  · Alcohol abuse  · Nausea- resolved  · Hyponatremia  · Leukocytosis    RECOMMENDATIONS:    · Clear diet   · IV fluids as ordered  · Medicate for pain and nausea per PCP  · Alcohol cessation  · Monitor CMP, CBC, amylase, lipase daily  · Continue to monitor  · Supportive care    Note: This report was completed utilizing computer voice recognition software. Every effort has been made to ensure accuracy, however; inadvertent computerized transcription errors may be present. Thank you very much for your consultation. We will follow closely with you.     Patient visit done by:  CHELSEY Solis  11/1/2020 1030 AM for Dr. Swati Pereira M.D. 11/1/2020 1:55 PM

## 2020-11-01 NOTE — PROGRESS NOTES
Notified Dr. Lor Shepherd continued complaint of pain after medication. Orders placed.   Akbar Nava RN baseline

## 2020-11-02 VITALS
OXYGEN SATURATION: 97 % | DIASTOLIC BLOOD PRESSURE: 73 MMHG | SYSTOLIC BLOOD PRESSURE: 117 MMHG | HEART RATE: 95 BPM | RESPIRATION RATE: 18 BRPM | BODY MASS INDEX: 26.16 KG/M2 | WEIGHT: 157 LBS | HEIGHT: 65 IN | TEMPERATURE: 98.7 F

## 2020-11-02 PROBLEM — E87.1 HYPONATREMIA: Status: RESOLVED | Noted: 2020-11-01 | Resolved: 2020-11-02

## 2020-11-02 PROBLEM — K85.90 ACUTE PANCREATITIS WITHOUT INFECTION OR NECROSIS: Status: RESOLVED | Noted: 2020-11-01 | Resolved: 2020-11-02

## 2020-11-02 LAB
ALBUMIN SERPL-MCNC: 4 G/DL (ref 3.5–5.2)
ALP BLD-CCNC: 87 U/L (ref 35–104)
ALT SERPL-CCNC: 14 U/L (ref 0–32)
AMYLASE: 45 U/L (ref 20–100)
ANION GAP SERPL CALCULATED.3IONS-SCNC: 10 MMOL/L (ref 7–16)
AST SERPL-CCNC: 17 U/L (ref 0–31)
BASOPHILS ABSOLUTE: 0.04 E9/L (ref 0–0.2)
BASOPHILS RELATIVE PERCENT: 0.3 % (ref 0–2)
BILIRUB SERPL-MCNC: 0.4 MG/DL (ref 0–1.2)
BUN BLDV-MCNC: 3 MG/DL (ref 6–20)
CALCIUM SERPL-MCNC: 9.4 MG/DL (ref 8.6–10.2)
CHLORIDE BLD-SCNC: 103 MMOL/L (ref 98–107)
CO2: 23 MMOL/L (ref 22–29)
CREAT SERPL-MCNC: 0.5 MG/DL (ref 0.5–1)
EOSINOPHILS ABSOLUTE: 0.16 E9/L (ref 0.05–0.5)
EOSINOPHILS RELATIVE PERCENT: 1.2 % (ref 0–6)
GFR AFRICAN AMERICAN: >60
GFR NON-AFRICAN AMERICAN: >60 ML/MIN/1.73
GLUCOSE BLD-MCNC: 96 MG/DL (ref 74–99)
HCT VFR BLD CALC: 35.7 % (ref 34–48)
HEMOGLOBIN: 12.1 G/DL (ref 11.5–15.5)
IMMATURE GRANULOCYTES #: 0.03 E9/L
IMMATURE GRANULOCYTES %: 0.2 % (ref 0–5)
INR BLD: 1.1
LIPASE: 57 U/L (ref 13–60)
LYMPHOCYTES ABSOLUTE: 1.91 E9/L (ref 1.5–4)
LYMPHOCYTES RELATIVE PERCENT: 14.8 % (ref 20–42)
MCH RBC QN AUTO: 33 PG (ref 26–35)
MCHC RBC AUTO-ENTMCNC: 33.9 % (ref 32–34.5)
MCV RBC AUTO: 97.3 FL (ref 80–99.9)
MONOCYTES ABSOLUTE: 1.23 E9/L (ref 0.1–0.95)
MONOCYTES RELATIVE PERCENT: 9.5 % (ref 2–12)
NEUTROPHILS ABSOLUTE: 9.57 E9/L (ref 1.8–7.3)
NEUTROPHILS RELATIVE PERCENT: 74 % (ref 43–80)
PDW BLD-RTO: 13 FL (ref 11.5–15)
PLATELET # BLD: 270 E9/L (ref 130–450)
PMV BLD AUTO: 9.4 FL (ref 7–12)
POTASSIUM REFLEX MAGNESIUM: 3.7 MMOL/L (ref 3.5–5)
POTASSIUM SERPL-SCNC: 3.7 MMOL/L (ref 3.5–5)
PROCALCITONIN: <0.02 NG/ML (ref 0–0.08)
PROTHROMBIN TIME: 12.5 SEC (ref 9.3–12.4)
RBC # BLD: 3.67 E12/L (ref 3.5–5.5)
SODIUM BLD-SCNC: 136 MMOL/L (ref 132–146)
TOTAL PROTEIN: 6.5 G/DL (ref 6.4–8.3)
TRIGL SERPL-MCNC: 58 MG/DL (ref 0–149)
WBC # BLD: 12.9 E9/L (ref 4.5–11.5)

## 2020-11-02 PROCEDURE — 82150 ASSAY OF AMYLASE: CPT

## 2020-11-02 PROCEDURE — G0378 HOSPITAL OBSERVATION PER HR: HCPCS

## 2020-11-02 PROCEDURE — 83690 ASSAY OF LIPASE: CPT

## 2020-11-02 PROCEDURE — 36415 COLL VENOUS BLD VENIPUNCTURE: CPT

## 2020-11-02 PROCEDURE — 6360000002 HC RX W HCPCS: Performed by: FAMILY MEDICINE

## 2020-11-02 PROCEDURE — 6360000002 HC RX W HCPCS: Performed by: STUDENT IN AN ORGANIZED HEALTH CARE EDUCATION/TRAINING PROGRAM

## 2020-11-02 PROCEDURE — 85610 PROTHROMBIN TIME: CPT

## 2020-11-02 PROCEDURE — 6370000000 HC RX 637 (ALT 250 FOR IP): Performed by: FAMILY MEDICINE

## 2020-11-02 PROCEDURE — 80053 COMPREHEN METABOLIC PANEL: CPT

## 2020-11-02 PROCEDURE — 85025 COMPLETE CBC W/AUTO DIFF WBC: CPT

## 2020-11-02 PROCEDURE — 2580000003 HC RX 258: Performed by: FAMILY MEDICINE

## 2020-11-02 PROCEDURE — 84478 ASSAY OF TRIGLYCERIDES: CPT

## 2020-11-02 PROCEDURE — 84145 PROCALCITONIN (PCT): CPT

## 2020-11-02 PROCEDURE — 96372 THER/PROPH/DIAG INJ SC/IM: CPT

## 2020-11-02 PROCEDURE — 99238 HOSP IP/OBS DSCHRG MGMT 30/<: CPT | Performed by: FAMILY MEDICINE

## 2020-11-02 PROCEDURE — 96376 TX/PRO/DX INJ SAME DRUG ADON: CPT

## 2020-11-02 RX ADMIN — LORAZEPAM 0.5 MG: 2 INJECTION INTRAMUSCULAR; INTRAVENOUS at 09:47

## 2020-11-02 RX ADMIN — PANTOPRAZOLE SODIUM 40 MG: 40 TABLET, DELAYED RELEASE ORAL at 06:42

## 2020-11-02 RX ADMIN — PREDNISONE 3 MG: 1 TABLET ORAL at 09:47

## 2020-11-02 RX ADMIN — LISINOPRIL 10 MG: 10 TABLET ORAL at 09:34

## 2020-11-02 RX ADMIN — SODIUM CHLORIDE, PRESERVATIVE FREE 10 ML: 5 INJECTION INTRAVENOUS at 09:35

## 2020-11-02 RX ADMIN — AMLODIPINE BESYLATE 5 MG: 5 TABLET ORAL at 09:34

## 2020-11-02 RX ADMIN — ENOXAPARIN SODIUM 40 MG: 40 INJECTION SUBCUTANEOUS at 09:34

## 2020-11-02 ASSESSMENT — ENCOUNTER SYMPTOMS
CHEST TIGHTNESS: 0
ABDOMINAL PAIN: 0
NAUSEA: 0
VOMITING: 0
COUGH: 0
DIARRHEA: 0
ABDOMINAL DISTENTION: 0

## 2020-11-02 ASSESSMENT — PAIN SCALES - GENERAL: PAINLEVEL_OUTOF10: 0

## 2020-11-02 NOTE — PROGRESS NOTES
PROGRESS NOTE    Patient Presents with/Seen in Consultation For      *Reason for Consult: acute pancreatitis, first time, alcohol use, steroid use     CHIEF COMPLAINT:  Abdominal pain and nausea    Subjective:     Patient seen sitting up in bed, eating breakfast. States she is feeling well. Denies any N/V. Slight abdominal pain. No BM since admission. Denies feelings of constipation. +flatus. POC reviewed with the patient, all questions answered. Review of Systems  Aside from what was mentioned in the PMH and HPI, essentially unremarkable, all others negative. Objective:     Patient Vitals for the past 8 hrs:   BP Temp Temp src Pulse Resp SpO2   11/02/20 0852 117/73 98.7 °F (37.1 °C) Oral 95 18 97 %       General appearance: alert, awake, sitting up in bed, and cooperative  Eyes: conjunctivae/corneas clear. PERRL.   Lungs: clear to auscultation bilaterally  Heart: regular rate and rhythm, no murmur, 2+ pulses; no without edema  Abdomen: soft, slight upper abdominal tenderness to palpitation, no guarding or rebound; bowel sounds normal; no masses  Extremities: extremities without edema  Pulses: 2+ and symmetric  Skin: Skin color, texture, turgor normal.   Neurologic: Grossly normal    pantoprazole (PROTONIX) tablet 40 mg, QAM AC  predniSONE (DELTASONE) tablet 3 mg, Daily  sodium chloride flush 0.9 % injection 10 mL, 2 times per day  sodium chloride flush 0.9 % injection 10 mL, PRN  acetaminophen (TYLENOL) tablet 650 mg, Q6H PRN    Or  acetaminophen (TYLENOL) suppository 650 mg, Q6H PRN  polyethylene glycol (GLYCOLAX) packet 17 g, Daily PRN  enoxaparin (LOVENOX) injection 40 mg, Daily  trimethobenzamide (TIGAN) injection 200 mg, Q6H PRN  nicotine (NICODERM CQ) 14 MG/24HR 1 patch, Daily  amLODIPine (NORVASC) tablet 5 mg, Daily    And  lisinopril (PRINIVIL;ZESTRIL) tablet 10 mg, Daily  FLUoxetine (PROZAC) capsule 60 mg, Nightly  hydroxychloroquine (PLAQUENIL) tablet 400 mg, Nightly  morphine (PF) injection 2 mg, Q4H PRN  morphine sulfate (PF) injection 4 mg, Q4H PRN  LORazepam (ATIVAN) injection 0.5 mg, BID  cyclobenzaprine (FLEXERIL) tablet 10 mg, Nightly  methotrexate (RHEUMATREX) chemo tablet 10 mg, Weekly         Data Review  CBC:   Lab Results   Component Value Date    WBC 12.9 11/02/2020    RBC 3.67 11/02/2020    HGB 12.1 11/02/2020    HCT 35.7 11/02/2020    MCV 97.3 11/02/2020    MCH 33.0 11/02/2020    MCHC 33.9 11/02/2020    RDW 13.0 11/02/2020     11/02/2020    MPV 9.4 11/02/2020     CMP:    Lab Results   Component Value Date     11/02/2020    K 3.7 11/02/2020    K 3.7 11/02/2020     11/02/2020    CO2 23 11/02/2020    BUN 3 11/02/2020    CREATININE 0.5 11/02/2020    GFRAA >60 11/02/2020    LABGLOM >60 11/02/2020    GLUCOSE 96 11/02/2020    PROT 6.5 11/02/2020    LABALBU 4.0 11/02/2020    CALCIUM 9.4 11/02/2020    BILITOT 0.4 11/02/2020    ALKPHOS 87 11/02/2020    AST 17 11/02/2020    ALT 14 11/02/2020     Hepatic Function Panel:    Lab Results   Component Value Date    ALKPHOS 87 11/02/2020    ALT 14 11/02/2020    AST 17 11/02/2020    PROT 6.5 11/02/2020    BILITOT 0.4 11/02/2020    BILIDIR 0.3 12/29/2015    IBILI 0.3 12/29/2015    LABALBU 4.0 11/02/2020     No components found for: CHLPL  Lab Results   Component Value Date    TRIG 58 11/02/2020    TRIG 111 07/16/2020    TRIG 141 03/16/2017     Lab Results   Component Value Date    HDL 56 07/16/2020    HDL 50 03/16/2017    HDL 50 02/05/2015     Lab Results   Component Value Date    LDLCALC 132 (H) 07/16/2020    LDLCALC 127 (H) 03/16/2017    LDLCALC 150 (H) 02/05/2015     Lab Results   Component Value Date    LABVLDL 22 07/16/2020    LABVLDL 28 03/16/2017    LABVLDL 18 02/05/2015      PT/INR:    Lab Results   Component Value Date    PROTIME 12.5 11/02/2020    INR 1.1 11/02/2020       Assessment:     Principal Problem (Resolved):  ? Acute pancreatitis   ? Abdominal pain LUQ  ? Alcohol abuse  ?  Nausea- resolved  ? Hyponatremia  ? Leukocytosis      Plan:     ? Low fat diet as tolerated  ? Medical managment per Primary Care  ? Medicate for nausea per PCP  ? Alcohol cessation- patient agrees  ? OP EGD too follow, office to arrange  ?  OK to DC from GI POV; when OK with others    Discussed with Dr. Cheyenne Eubanks per Dr. Nicole Bal, NP-C 11/2/2020 10:00 AM For Dr. Carlos Muñiz

## 2020-11-02 NOTE — PROGRESS NOTES
[facility]  Progress Note    Chief complaint :  Chief Complaint   Patient presents with    Abdominal Pain     mid abdomen    Nausea     Subjective:    Patient was seen and examined this morning. Patient states the pain has improved with the increased dosage of morphine. Patient denies any abdominal pain, nausea, vomiting, changes in BM. Past medical, surgical, family and social history were reviewed, non-contributory, and unchanged unless otherwise stated. Review of Systems   Constitutional: Negative for activity change and fever. Respiratory: Negative for cough and chest tightness. Cardiovascular: Negative for chest pain, palpitations and leg swelling. Gastrointestinal: Negative for abdominal distention, abdominal pain, diarrhea, nausea and vomiting. Objective:  /76   Pulse 109   Temp 98.7 °F (37.1 °C) (Oral)   Resp 16   Ht 5' 5\" (1.651 m)   Wt 157 lb (71.2 kg)   SpO2 94%   BMI 26.13 kg/m²     Physical Exam  Constitutional:       Appearance: Normal appearance. Cardiovascular:      Rate and Rhythm: Normal rate and regular rhythm. Pulses: Normal pulses. Heart sounds: Murmur (Systolic) present. Pulmonary:      Effort: Pulmonary effort is normal.      Breath sounds: Normal breath sounds. Abdominal:      General: There is no distension. Tenderness: There is no abdominal tenderness. There is no guarding or rebound. Neurological:      Mental Status: She is alert.    Psychiatric:         Mood and Affect: Mood normal.         Behavior: Behavior normal.       Labs:  Recent Results (from the past 24 hour(s))   URINE ELECTROLYTES    Collection Time: 11/01/20  8:52 AM   Result Value Ref Range    Sodium, Ur 130 Not Established mmol/L    Potassium, Ur 15.0 Not Established mmol/L    Chloride 122 Not Established mmol/L   Creatinine, urine, random    Collection Time: 11/01/20  8:52 AM   Result Value Ref Range    Creatinine, Ur 15 (L) 29 - 226 mg/dL   Osmolality, urine    Collection Time: 11/01/20  8:52 AM   Result Value Ref Range    Osmolality, Ur 307 300 - 900 mOsm/kg   Comprehensive metabolic panel    Collection Time: 11/01/20 10:10 AM   Result Value Ref Range    Sodium 134 132 - 146 mmol/L    Potassium 3.4 (L) 3.5 - 5.0 mmol/L    Chloride 104 98 - 107 mmol/L    CO2 23 22 - 29 mmol/L    Anion Gap 7 7 - 16 mmol/L    Glucose 110 (H) 74 - 99 mg/dL    BUN 3 (L) 6 - 20 mg/dL    CREATININE 0.5 0.5 - 1.0 mg/dL    GFR Non-African American >60 >=60 mL/min/1.73    GFR African American >60     Calcium 8.5 (L) 8.6 - 10.2 mg/dL    Total Protein 6.5 6.4 - 8.3 g/dL    Alb 3.9 3.5 - 5.2 g/dL    Total Bilirubin 0.3 0.0 - 1.2 mg/dL    Alkaline Phosphatase 86 35 - 104 U/L    ALT 15 0 - 32 U/L    AST 19 0 - 31 U/L   CBC WITH AUTO DIFFERENTIAL    Collection Time: 11/01/20 10:10 AM   Result Value Ref Range    WBC 13.6 (H) 4.5 - 11.5 E9/L    RBC 3.71 3.50 - 5.50 E12/L    Hemoglobin 12.2 11.5 - 15.5 g/dL    Hematocrit 35.8 34.0 - 48.0 %    MCV 96.5 80.0 - 99.9 fL    MCH 32.9 26.0 - 35.0 pg    MCHC 34.1 32.0 - 34.5 %    RDW 13.0 11.5 - 15.0 fL    Platelets 423 666 - 885 E9/L    MPV 9.2 7.0 - 12.0 fL    Neutrophils % 81.8 (H) 43.0 - 80.0 %    Immature Granulocytes % 0.3 0.0 - 5.0 %    Lymphocytes % 7.4 (L) 20.0 - 42.0 %    Monocytes % 9.4 2.0 - 12.0 %    Eosinophils % 0.8 0.0 - 6.0 %    Basophils % 0.3 0.0 - 2.0 %    Neutrophils Absolute 11.12 (H) 1.80 - 7.30 E9/L    Immature Granulocytes # 0.04 E9/L    Lymphocytes Absolute 1.00 (L) 1.50 - 4.00 E9/L    Monocytes Absolute 1.27 (H) 0.10 - 0.95 E9/L    Eosinophils Absolute 0.11 0.05 - 0.50 E9/L    Basophils Absolute 0.04 0.00 - 0.20 E9/L   Lipase    Collection Time: 11/01/20 10:10 AM   Result Value Ref Range    Lipase 68 (H) 13 - 60 U/L   Amylase    Collection Time: 11/01/20 10:10 AM   Result Value Ref Range    Amylase 43 20 - 100 U/L   Comprehensive metabolic panel    Collection Time: 11/02/20  3:25 AM   Result Value Ref Range    Sodium 136 132 - 146 mmol/L    Potassium 3.7 3.5 - 5.0 mmol/L    Chloride 103 98 - 107 mmol/L    CO2 23 22 - 29 mmol/L    Anion Gap 10 7 - 16 mmol/L    Glucose 96 74 - 99 mg/dL    BUN 3 (L) 6 - 20 mg/dL    CREATININE 0.5 0.5 - 1.0 mg/dL    GFR Non-African American >60 >=60 mL/min/1.73    GFR African American >60     Calcium 9.4 8.6 - 10.2 mg/dL    Total Protein 6.5 6.4 - 8.3 g/dL    Alb 4.0 3.5 - 5.2 g/dL    Total Bilirubin 0.4 0.0 - 1.2 mg/dL    Alkaline Phosphatase 87 35 - 104 U/L    ALT 14 0 - 32 U/L    AST 17 0 - 31 U/L   CBC WITH AUTO DIFFERENTIAL    Collection Time: 11/02/20  3:25 AM   Result Value Ref Range    WBC 12.9 (H) 4.5 - 11.5 E9/L    RBC 3.67 3.50 - 5.50 E12/L    Hemoglobin 12.1 11.5 - 15.5 g/dL    Hematocrit 35.7 34.0 - 48.0 %    MCV 97.3 80.0 - 99.9 fL    MCH 33.0 26.0 - 35.0 pg    MCHC 33.9 32.0 - 34.5 %    RDW 13.0 11.5 - 15.0 fL    Platelets 554 923 - 433 E9/L    MPV 9.4 7.0 - 12.0 fL    Neutrophils % 74.0 43.0 - 80.0 %    Immature Granulocytes % 0.2 0.0 - 5.0 %    Lymphocytes % 14.8 (L) 20.0 - 42.0 %    Monocytes % 9.5 2.0 - 12.0 %    Eosinophils % 1.2 0.0 - 6.0 %    Basophils % 0.3 0.0 - 2.0 %    Neutrophils Absolute 9.57 (H) 1.80 - 7.30 E9/L    Immature Granulocytes # 0.03 E9/L    Lymphocytes Absolute 1.91 1.50 - 4.00 E9/L    Monocytes Absolute 1.23 (H) 0.10 - 0.95 E9/L    Eosinophils Absolute 0.16 0.05 - 0.50 E9/L    Basophils Absolute 0.04 0.00 - 0.20 E9/L   Lipase    Collection Time: 11/02/20  3:25 AM   Result Value Ref Range    Lipase 57 13 - 60 U/L   Amylase    Collection Time: 11/02/20  3:25 AM   Result Value Ref Range    Amylase 45 20 - 100 U/L   Comprehensive Metabolic Panel w/ Reflex to MG    Collection Time: 11/02/20  3:25 AM   Result Value Ref Range    Potassium reflex Magnesium 3.7 3.5 - 5.0 mmol/L       Radiology and other tests reviewed:  XR CHEST (2 VW)   Final Result   Focal patchy infiltrate versus nodule in right mid lung.   Consider CT of this   does not resolve in a timely manner. CT ABDOMEN PELVIS W IV CONTRAST Additional Contrast? None   Final Result   Mild peripancreatic reticulation which may indicate underlying acute   pancreatitis. Recommend correlation with clinical/laboratory analysis. Chronic fracture deformities involving the lateral right 5th and 6th ribs as   well as chronic anterior compression of the L3 vertebral body. Assessment:  Active Hospital Problems    Diagnosis Date Noted    Leukocytosis [D72.829] 12/28/2015     Priority: Medium    Depression [F32.9] 12/28/2015     Priority: Low    HTN (hypertension) [I10] 04/27/2015     Priority: Low    Acute pancreatitis without infection or necrosis [K85.90] 11/01/2020    Hyponatremia [E87.1] 11/01/2020    Abdominal pain, epigastric [R10.13]        Plan:    Acute Pancreatitis  - Abdominal pain has resolved. No nausea, vomiting, changes in BM. Not requiring pain meds. -Advance to low fat diet. Possible d/c today if patient tolerates. -IVF stopped. Hyponatremia  - Resolved    Leukocytosis  - Improving  - No fever, chills.      Hypertension  -Continue lisinopril, amlodipine    Juan Peguero MD  Family Medicine Resident PGY-1  11/02/20   6:59 AM

## 2020-11-02 NOTE — DISCHARGE SUMMARY
evening. What changed:    · how much to take  · when to take this  · additional instructions     predniSONE 5 MG tablet  Commonly known as:  DELTASONE  Take 1 tablet by mouth daily  What changed:  how much to take        CONTINUE taking these medications    amLODIPine-benazepril 5-10 MG per capsule  Commonly known as:  LOTREL  take 1 capsule by mouth once daily     BL FLAX SEED OIL PO     busPIRone 5 MG tablet  Commonly known as:  BUSPAR     CALCIUM-VITAMIN D PO     cyclobenzaprine 10 MG tablet  Commonly known as:  FLEXERIL  Take 1 tablet by mouth nightly as needed for Muscle spasms     fish oil 1000 MG Caps     * HAIR/SKIN/NAILS PO     * CENTRA-JACOBY PO     hydroxychloroquine 200 MG tablet  Commonly known as:  PLAQUENIL     hypromellose 0.4 % Soln ophthalmic solution  Commonly known as:  Artificial Tears  Place 1 drop into both eyes every 4 hours as needed (dry eyes)     methotrexate 2.5 MG chemo tablet  Commonly known as:  RHEUMATREX     omeprazole 20 MG delayed release capsule  Commonly known as:  PRILOSEC  Take 1 capsule by mouth Daily     VITAMIN B COMPLEX PO     vitamin D 1000 UNIT Tabs tablet  Commonly known as:  CHOLECALCIFEROL     Xiidra 5 % Soln  Generic drug:  Lifitegrast         * This list has 2 medication(s) that are the same as other medications prescribed for you. Read the directions carefully, and ask your doctor or other care provider to review them with you.               Activity: activity as tolerated  Diet: low fat, low cholesterol diet    Jorden Runner, MD  [address] [id number] [phone]    Call in 2 days      [facility], [address] [place] [address]  [phone]    Call in 2 days      Signed:  Sanjuana [facility]  11/2/2020  10:09 AM

## 2020-11-02 NOTE — PROGRESS NOTES
Notified Dr. Calvin Estrada to patient notification of some evening meds that were not ordered. Orders placed. 24 hour chart check complete.   Amol Godinez RN

## 2020-11-02 NOTE — CARE COORDINATION
Introduced my self and provided explanation of CM role to patient. Patient is awake, alert, and aware of current diagnosis and treatment plan including monitoring of diet tolerance, likely discharge. Patient verbalizes no concerns and identifies no areas to focus on nor barriers to discharge. Patient is established with a pcp and denies any issue with retail pharmaceutical coverage. She has contact with daughter and parent in the community and completes her adl's independently. She is anticipating discharge today and denies post discharge needs. Freddy Galvan.  Star, MSN, RN  [facility] Case Management  [phone]

## 2020-11-03 ENCOUNTER — TELEPHONE (OUTPATIENT)
Dept: FAMILY MEDICINE CLINIC | Age: 50
End: 2020-11-03

## 2020-11-03 NOTE — TELEPHONE ENCOUNTER
[facility] Transitions Initial Follow Up Call    Outreach made within 2 business days of discharge: Yes    Patient: Maria A Sweeney Patient : 1970   MRN: [id number]  Reason for Admission: There are no discharge diagnoses documented for the most recent discharge. Discharge Date: 20       Spoke with: left message to call myself or Dr Ronen Caal @ [phone]    Discharge department/facility: home     Follow Up  No future appointments.     Norma Lundberg

## 2020-11-06 ENCOUNTER — OFFICE VISIT (OUTPATIENT)
Dept: FAMILY MEDICINE CLINIC | Age: 50
End: 2020-11-06
Payer: MEDICAID

## 2020-11-06 VITALS
HEIGHT: 65 IN | WEIGHT: 156 LBS | DIASTOLIC BLOOD PRESSURE: 88 MMHG | SYSTOLIC BLOOD PRESSURE: 134 MMHG | OXYGEN SATURATION: 100 % | HEART RATE: 98 BPM | BODY MASS INDEX: 25.99 KG/M2 | TEMPERATURE: 97.6 F

## 2020-11-06 PROCEDURE — G8419 CALC BMI OUT NRM PARAM NOF/U: HCPCS | Performed by: FAMILY MEDICINE

## 2020-11-06 PROCEDURE — 1111F DSCHRG MED/CURRENT MED MERGE: CPT | Performed by: FAMILY MEDICINE

## 2020-11-06 PROCEDURE — G8427 DOCREV CUR MEDS BY ELIG CLIN: HCPCS | Performed by: FAMILY MEDICINE

## 2020-11-06 PROCEDURE — G8484 FLU IMMUNIZE NO ADMIN: HCPCS | Performed by: FAMILY MEDICINE

## 2020-11-06 PROCEDURE — 99214 OFFICE O/P EST MOD 30 MIN: CPT | Performed by: FAMILY MEDICINE

## 2020-11-06 PROCEDURE — 4004F PT TOBACCO SCREEN RCVD TLK: CPT | Performed by: FAMILY MEDICINE

## 2020-11-06 RX ORDER — TRIAMCINOLONE ACETONIDE 1 MG/G
CREAM TOPICAL
COMMUNITY
Start: 2020-10-20 | End: 2021-03-24

## 2020-11-06 RX ORDER — NICOTINE 21 MG/24HR
1 PATCH, TRANSDERMAL 24 HOURS TRANSDERMAL DAILY
Qty: 42 PATCH | Refills: 0 | Status: SHIPPED
Start: 2020-11-06 | End: 2021-04-30

## 2020-11-06 NOTE — PROGRESS NOTES
weakness,   No bowel changes, No hematochezia, No melena,  No bladder changes, No hematuria  No skin rashes, No skin lesions. No polyuria, polydipsia, polyphagia. Stable mood. ROS otherwise negative unless as listed in HPI. Chart reviewed and updated where appropriate for PMH, Fam, and Soc Hx.  __________________________________________________________  Physical Exam   /88 (Site: Left Upper Arm, Position: Sitting, Cuff Size: Medium Adult)   Pulse 98   Temp 97.6 °F (36.4 °C) (Temporal)   Ht 5' 5\" (1.651 m)   Wt 156 lb (70.8 kg)   SpO2 100%   BMI 25.96 kg/m²   Wt Readings from Last 3 Encounters:   11/06/20 156 lb (70.8 kg)   11/01/20 157 lb (71.2 kg)   08/17/20 157 lb (71.2 kg)       Constitutional:    She is oriented to person, place, and time. She appears well-developed and well-nourished. Cardiovascular:    Normal rate, regular rhythm and normal heart sounds. No murmur. No gallop and no friction rub. Pulmonary/Chest:    Effort normal and breath sounds normal.    No wheezes. No rales or rhonchi. Abdominal:    Soft. Bowel sounds are normal.    No distension. No tenderness. Musculoskeletal:    Normal range of motion. No joint swelling noted. No peripheral edema. Skin:    Skin is warm and dry. No rashes, No lesions. Psychiatric:    She has a normal mood and affect. Normal groom and dress. No SI or HI.   ________________________________________________________  Current Outpatient Medications on File Prior to Visit   Medication Sig Dispense Refill    triamcinolone (KENALOG) 0.1 % cream Apply twice a day to active rash on hands and elbow.       busPIRone (BUSPAR) 5 MG tablet Take 7.5 mg by mouth 2 times daily      amLODIPine-benazepril (LOTREL) 5-10 MG per capsule take 1 capsule by mouth once daily 30 capsule 5    omeprazole (PRILOSEC) 20 MG delayed release capsule Take 1 capsule by mouth Daily 30 capsule 5    predniSONE (DELTASONE) 5 MG tablet Take 1 tablet by mouth daily (Patient taking differently: Take 3 mg by mouth daily ) 30 tablet 2    cyclobenzaprine (FLEXERIL) 10 MG tablet Take 1 tablet by mouth nightly as needed for Muscle spasms 30 tablet 5    FLUoxetine (PROZAC) 20 MG capsule Take 1 capsule by mouth every morning and 2 capsules every evening. (Patient taking differently: 60 mg daily Takes 3 20mg tablets) 90 capsule 5    Multiple Vitamins-Minerals (CENTRA-JACOBY PO) Take by mouth daily      Lifitegrast (XIIDRA) 5 % SOLN Apply to eye 2 times daily       methotrexate (RHEUMATREX) 2.5 MG chemo tablet Take 2.5 mg by mouth once a week 4 pills weekly      Flaxseed, Linseed, (BL FLAX SEED OIL PO) Take by mouth daily      hydroxychloroquine (PLAQUENIL) 200 MG tablet Take 400 mg by mouth daily       Omega-3 Fatty Acids (FISH OIL) 1000 MG CAPS Take 3,000 mg by mouth daily       hypromellose (ARTIFICIAL TEARS) 0.4 % SOLN ophthalmic solution Place 1 drop into both eyes every 4 hours as needed (dry eyes) 15 mL 0    vitamin D (CHOLECALCIFEROL) 1000 UNIT TABS tablet Take 1,000 Units by mouth every morning       Multiple Vitamins-Minerals (HAIR/SKIN/NAILS PO) Take 1 tablet by mouth every morning       B Complex Vitamins (VITAMIN B COMPLEX PO) Take 1 tablet by mouth every morning       CALCIUM-VITAMIN D PO Take 1 tablet by mouth every morning        No current facility-administered medications on file prior to visit.         Patient Active Problem List   Diagnosis Code    Anxiety F41.9    HTN (hypertension) I10    Raynaud's phenomenon I73.00    Chronic daily headache R51.9    Leukocytosis D72.829    Cigarette smoker F17.210    Depression F32.9    Elevated LFTs R79.89    Alcohol-induced acute pancreatitis without infection or necrosis K85.20      ________________________________________________________  Assessment / Bela Bush was seen today for follow-up from hospital.    Diagnoses and all orders for this visit:    Postmenopausal  -     DEXA BONE DENSITY 2 SITES; Future    Encounter for mammogram to establish baseline mammogram  -     [facility] KAITY DIGITAL SCREEN BILATERAL; Future    Idiopathic acute pancreatitis without infection or necrosis  Resolved  Suspect chronic EtOH related as it was a very mild episode. Cant attribute to stones or Tgs. Less likely a rheum process involved. Continues to follow with [facility] Rheum. Other orders  -     nicotine (NICODERM CQ) 14 MG/24HR; Place 1 patch onto the skin daily  She is motivated to work on stopping smoking. Patch helped her in hospital.       Erythromelalgia as cause of her neuropathic and rash symptoms on hands and feet? Rare genetic condition. Return in about 2 months (around 1/6/2021). or as scheduled. Patient counseled to follow up sooner or seek more acute care if symptoms worsening or not improving according to plan. Please note that >25 minutes was spent face-to-face with patient gathering history, performing physical exam, discussing findings, counseling patient, and determining plan forward. All questions addressed and answered. Electronically signed by Giancarlo Figueroa MD on 11/10/2020    This note may have been created using dictation software.  Efforts were made to reduce grammatical or syntax errors, but some may persist.

## 2020-11-12 NOTE — TELEPHONE ENCOUNTER
Last Appointment:  11/6/2020  Future Appointments   Date Time Provider Yanet Quiroz   1/7/2021 10:00 AM Jayesh Joseph  [address] [facility] [address]

## 2020-11-13 RX ORDER — CYCLOBENZAPRINE HCL 10 MG
10 TABLET ORAL NIGHTLY PRN
Qty: 30 TABLET | Refills: 5 | Status: SHIPPED | OUTPATIENT
Start: 2020-11-13

## 2020-11-13 RX ORDER — FLUOXETINE HYDROCHLORIDE 20 MG/1
CAPSULE ORAL
Qty: 90 CAPSULE | Refills: 5 | Status: SHIPPED
Start: 2020-11-13 | End: 2021-04-28

## 2020-12-07 ENCOUNTER — TELEPHONE (OUTPATIENT)
Dept: ADMINISTRATIVE | Age: 50
End: 2020-12-07

## 2020-12-08 ENCOUNTER — NURSE ONLY (OUTPATIENT)
Dept: FAMILY MEDICINE CLINIC | Age: 50
End: 2020-12-08
Payer: MEDICAID

## 2020-12-08 PROCEDURE — 90686 IIV4 VACC NO PRSV 0.5 ML IM: CPT | Performed by: FAMILY MEDICINE

## 2020-12-08 PROCEDURE — 90471 IMMUNIZATION ADMIN: CPT | Performed by: FAMILY MEDICINE

## 2021-01-07 ENCOUNTER — OFFICE VISIT (OUTPATIENT)
Dept: FAMILY MEDICINE CLINIC | Age: 51
End: 2021-01-07
Payer: MEDICAID

## 2021-01-07 VITALS
SYSTOLIC BLOOD PRESSURE: 138 MMHG | HEIGHT: 65 IN | DIASTOLIC BLOOD PRESSURE: 84 MMHG | OXYGEN SATURATION: 100 % | BODY MASS INDEX: 26.16 KG/M2 | TEMPERATURE: 97.3 F | HEART RATE: 91 BPM | WEIGHT: 157 LBS

## 2021-01-07 DIAGNOSIS — G62.9 SMALL FIBER NEUROPATHY: ICD-10-CM

## 2021-01-07 DIAGNOSIS — M33.90 DERMATOMYOSITIS (HCC): Primary | ICD-10-CM

## 2021-01-07 DIAGNOSIS — I10 ESSENTIAL HYPERTENSION: ICD-10-CM

## 2021-01-07 PROCEDURE — 4004F PT TOBACCO SCREEN RCVD TLK: CPT | Performed by: FAMILY MEDICINE

## 2021-01-07 PROCEDURE — G8482 FLU IMMUNIZE ORDER/ADMIN: HCPCS | Performed by: FAMILY MEDICINE

## 2021-01-07 PROCEDURE — 99214 OFFICE O/P EST MOD 30 MIN: CPT | Performed by: FAMILY MEDICINE

## 2021-01-07 PROCEDURE — G8419 CALC BMI OUT NRM PARAM NOF/U: HCPCS | Performed by: FAMILY MEDICINE

## 2021-01-07 PROCEDURE — G8427 DOCREV CUR MEDS BY ELIG CLIN: HCPCS | Performed by: FAMILY MEDICINE

## 2021-01-07 RX ORDER — BUSPIRONE HYDROCHLORIDE 7.5 MG/1
TABLET ORAL
COMMUNITY
Start: 2020-12-17 | End: 2021-02-15

## 2021-01-07 RX ORDER — PREDNISONE 1 MG/1
5 TABLET ORAL DAILY
COMMUNITY
Start: 2020-12-07 | End: 2021-03-24

## 2021-01-07 NOTE — PROGRESS NOTES
[facility]  Office Progress Note - Dr. Gayatri David  1/7/21    CC:   Chief Complaint   Patient presents with    Skin Problem     Hand and feet, warm to touch and turn red.  Hypertension        HPI:  She was up to [facility] rheum and her rheum fellow called me to update  They do think she has a dermatomyositis problem that is mostly affecting the skin. They are trying some Tx for that. She went about 6 weeks off MTX. She had her flu shot during that time. She restarted the MTX, made her sick, and she was again taken off of it by rheum. She is going to follow up with rheum. She continues to have on and off flares of hand and foot redness  She thinks made worse by exposure to the sun. And in particular heat - socks, gloves, having extremities under the covers. Hypertension  Follow-up  Blood pressure is borderline controlled today. A little better on recheck. BP Readings from Last 3 Encounters:   01/07/21 138/84   11/06/20 134/88   11/02/20 117/73     Patient continues medications regularly. Compliance is good. Denies CP, sob, abd pain, headaches, vision changes, dizziness, hypotensive symptoms. No side effects from medications noted. Mood  Continues prozac and buspar. No recurrent pancreatitis symptoms.      _________________________________________________________  Past Medical History:   Diagnosis Date    Anxiety     Depression     Herniated disc     Hypertension     Pneumonia     Raynaud's disease     TMJ (dislocation of temporomandibular joint)        Family History   Problem Relation Age of Onset    Heart Disease Father         CHF       Past Surgical History:   Procedure Laterality Date    HYSTERECTOMY      OVARIAN CYST REMOVAL      benign mass removal    SKIN BIOPSY Left     left hand       Social History     Tobacco Use    Smoking status: Current Every Day Smoker     Packs/day: 0.50     Years: 30.00     Pack years: 15.00     Types: Cigarettes    Smokeless tobacco: Never Used   Substance Use Topics    Alcohol use: Yes     Frequency: 2-3 times a week     Drinks per session: 3 or 4     Comment: occasional    Drug use: No     _________________________________________________________  ROS: POSITIVE:tingling, numbness in hands  Burning in hands on and off. Face rash on and off. Otherwise:  No CP, No palpitations,   No sob, No cough,   No abd pain, No heartburn,   No headaches, No vision changes, No hearing changes,   No tingling, No numbness, No weakness,   No bowel changes, No hematochezia, No melena,  No bladder changes, No hematuria  No skin rashes, No skin lesions. No polyuria, polydipsia, polyphagia. Stable mood. ROS otherwise negative unless as listed in HPI. Chart reviewed and updated where appropriate for PMH, Fam, and Soc Hx.  __________________________________________________________  Physical Exam   /84   Pulse 91   Temp 97.3 °F (36.3 °C) (Temporal)   Ht 5' 5\" (1.651 m)   Wt 157 lb (71.2 kg)   SpO2 100%   BMI 26.13 kg/m²   Wt Readings from Last 3 Encounters:   01/07/21 157 lb (71.2 kg)   11/06/20 156 lb (70.8 kg)   11/01/20 157 lb (71.2 kg)       Constitutional:    She is oriented to person, place, and time. She appears well-developed and well-nourished. Cardiovascular:    Normal rate, regular rhythm and normal heart sounds. No murmur. No gallop and no friction rub. Pulmonary/Chest:    Effort normal and breath sounds normal.    No wheezes. No rales or rhonchi. Abdominal:    Soft. Bowel sounds are normal.    No distension. No tenderness. Musculoskeletal:    Normal range of motion. No joint swelling noted. No peripheral edema. Skin:    Skin is warm and dry. No rashes, No lesions. Psychiatric:    She has a normal mood and anxious affect. Normal groom and dress.  No SI or HI.   ________________________________________________________  Current Outpatient Medications on File Prior to Visit   Medication Sig Dispense Refill    busPIRone (BUSPAR) 7.5 MG tablet take 1 tablet by mouth twice a day      hydrocortisone 2.5 % cream Apply twice a day to active rash on face.  predniSONE (DELTASONE) 1 MG tablet Take 2 mg by mouth daily      cyclobenzaprine (FLEXERIL) 10 MG tablet Take 1 tablet by mouth nightly as needed for Muscle spasms 30 tablet 5    FLUoxetine (PROZAC) 20 MG capsule Take 1 capsule by mouth every morning and 2 capsules every evening. 90 capsule 5    triamcinolone (KENALOG) 0.1 % cream Apply twice a day to active rash on hands and elbow.  nicotine (NICODERM CQ) 14 MG/24HR Place 1 patch onto the skin daily 42 patch 0    amLODIPine-benazepril (LOTREL) 5-10 MG per capsule take 1 capsule by mouth once daily 30 capsule 5    omeprazole (PRILOSEC) 20 MG delayed release capsule Take 1 capsule by mouth Daily 30 capsule 5    Multiple Vitamins-Minerals (CENTRA-JACOBY PO) Take by mouth daily      Lifitegrast (XIIDRA) 5 % SOLN Apply to eye 2 times daily       Flaxseed, Linseed, (BL FLAX SEED OIL PO) Take by mouth daily      hydroxychloroquine (PLAQUENIL) 200 MG tablet Take 400 mg by mouth daily       Omega-3 Fatty Acids (FISH OIL) 1000 MG CAPS Take 3,000 mg by mouth daily       hypromellose (ARTIFICIAL TEARS) 0.4 % SOLN ophthalmic solution Place 1 drop into both eyes every 4 hours as needed (dry eyes) 15 mL 0    vitamin D (CHOLECALCIFEROL) 1000 UNIT TABS tablet Take 1,000 Units by mouth every morning       Multiple Vitamins-Minerals (HAIR/SKIN/NAILS PO) Take 1 tablet by mouth every morning       B Complex Vitamins (VITAMIN B COMPLEX PO) Take 1 tablet by mouth every morning       CALCIUM-VITAMIN D PO Take 1 tablet by mouth every morning        No current facility-administered medications on file prior to visit.         Patient Active Problem List   Diagnosis Code    Anxiety F41.9    HTN (hypertension) I10    Raynaud's phenomenon I73.00    Chronic daily headache R51.9    Leukocytosis D72.829    Cigarette smoker F17.210    Depression F32.9    Elevated LFTs R79.89    Alcohol-induced acute pancreatitis without infection or necrosis K85.20      ________________________________________________________  Assessment / Khushbu Stewarts was seen today for skin problem and hypertension. Diagnoses and all orders for this visit:    Dermatomyositis ([address]  Discussed  Continues bothersome  Reviewed chat notes from [facility]. Encouraged to follow up with them as they have not completed workup. She is to schedule US and then see Rheum on same day. Small fiber neuropathy  -     Monik Pantoja MD, Neurology, [place]  Possible Dx suggested by Rheum. They recommended Neuro eval for this. Will refer to local neuro for eval.   Discussed rarely find cause, sometimes can be the rheum disorder itself. Often managed with Tx of underlying condition or use of neuro pain meds to improve symptoms. Essential hypertension  Borderline but controlled. Continue same. Feeling well. Return in about 6 months (around 7/7/2021). or as scheduled. Patient counseled to follow up sooner or seek more acute care if symptoms worsening or not improving according to plan. Electronically signed by Keren Virk MD on 1/7/2021  Please note that >30 minutes was spent face-to-face with patient gathering history, performing physical exam, discussing findings, counseling patient, and determining plan forward, documenting and coordinating care. All questions addressed and answered. This note may have been created using dictation software.  Efforts were made to reduce grammatical or syntax errors, but some may persist.

## 2021-01-09 ENCOUNTER — OFFICE VISIT (OUTPATIENT)
Dept: FAMILY MEDICINE CLINIC | Age: 51
End: 2021-01-09
Payer: MEDICAID

## 2021-01-09 VITALS
HEIGHT: 65 IN | DIASTOLIC BLOOD PRESSURE: 76 MMHG | BODY MASS INDEX: 25.99 KG/M2 | WEIGHT: 156 LBS | SYSTOLIC BLOOD PRESSURE: 144 MMHG | RESPIRATION RATE: 14 BRPM | TEMPERATURE: 97.5 F

## 2021-01-09 DIAGNOSIS — R60.0 EDEMA OF LEFT LOWER LEG: ICD-10-CM

## 2021-01-09 DIAGNOSIS — M79.605 LEFT LEG PAIN: Primary | ICD-10-CM

## 2021-01-09 PROCEDURE — 4004F PT TOBACCO SCREEN RCVD TLK: CPT | Performed by: FAMILY MEDICINE

## 2021-01-09 PROCEDURE — 99213 OFFICE O/P EST LOW 20 MIN: CPT | Performed by: FAMILY MEDICINE

## 2021-01-09 PROCEDURE — G8419 CALC BMI OUT NRM PARAM NOF/U: HCPCS | Performed by: FAMILY MEDICINE

## 2021-01-09 PROCEDURE — G8427 DOCREV CUR MEDS BY ELIG CLIN: HCPCS | Performed by: FAMILY MEDICINE

## 2021-01-09 PROCEDURE — G8482 FLU IMMUNIZE ORDER/ADMIN: HCPCS | Performed by: FAMILY MEDICINE

## 2021-01-09 ASSESSMENT — ENCOUNTER SYMPTOMS
DIARRHEA: 0
SHORTNESS OF BREATH: 0
CONSTIPATION: 0
TROUBLE SWALLOWING: 0
SINUS PAIN: 0
COUGH: 0
NAUSEA: 0
CHEST TIGHTNESS: 0
ABDOMINAL PAIN: 0
SORE THROAT: 0
BACK PAIN: 0
VOMITING: 0
EYE PAIN: 0
WHEEZING: 0

## 2021-01-09 NOTE — PROGRESS NOTES
Andree Shaw (:  1970) is a 53 y.o. female,Established patient, here for evaluation of the following chief complaint(s):  Leg Injury (Left lower leg bruising, swelling and pain x1 week from collision with roller skates) and Tingling (pins & needles posterior left lower leg)      ASSESSMENT/PLAN:  1. Left leg pain  Comments:  elevvate and warm compresses  asa daily 81mg  get stat ultrasound to be sure there is no DVT  Orders:  -     US DUP LOWER EXTREMITY LEFT MARVA; Future  2. Edema of left lower leg  Comments:  elevated and warm compresses      Return if symptoms worsen or fail to improve. SUBJECTIVE/OBJECTIVE:  Patient here with left lower leg injury  Occurred 1 week ago today roller skating  She got hit with another skaters skate  Immediately had swelling and pain in the left lower leg on the outside  Then over the last 4 days the bruising started to move down her leg into the foot  But now swelling in the lower leg and pain  Pain is laterally and in the back of the calf          Review of Systems   Constitutional: Negative for appetite change, fatigue and fever. HENT: Negative for congestion, ear pain, sinus pain, sore throat and trouble swallowing. Eyes: Negative for pain. Respiratory: Negative for cough, chest tightness, shortness of breath and wheezing. Cardiovascular: Positive for leg swelling. Negative for chest pain and palpitations. Left leg swelling and bruising   Gastrointestinal: Negative for abdominal pain, constipation, diarrhea, nausea and vomiting. Endocrine: Negative for cold intolerance and heat intolerance. Genitourinary: Negative for difficulty urinating, hematuria and pelvic pain. Musculoskeletal: Negative for back pain, gait problem and joint swelling. Skin: Negative for rash and wound. Neurological: Negative for dizziness, syncope and headaches. Hematological: Negative for adenopathy. Psychiatric/Behavioral: Negative for confusion, sleep disturbance and suicidal ideas. Physical Exam  Vitals signs and nursing note reviewed. Constitutional:       General: She is not in acute distress. Appearance: Normal appearance. She is well-developed. She is not ill-appearing. HENT:      Head: Normocephalic and atraumatic. Right Ear: Tympanic membrane normal.      Left Ear: Tympanic membrane normal.      Nose: Nose normal.      Mouth/Throat:      Mouth: Mucous membranes are moist.   Eyes:      Pupils: Pupils are equal, round, and reactive to light. Neck:      Musculoskeletal: Normal range of motion. Cardiovascular:      Rate and Rhythm: Normal rate and regular rhythm. Pulmonary:      Effort: Pulmonary effort is normal.      Breath sounds: Normal breath sounds. Abdominal:      General: Bowel sounds are normal.      Palpations: Abdomen is soft. Musculoskeletal:      Comments: Left lower leg there is a resolving hematoma about 4 inches down from knee on lateral side of the left lower leg  Lower leg is swollen with calf tenderness and ecchymosis in anterior leg and on both side of the ankle   Skin:     General: Skin is warm and dry. Neurological:      Mental Status: She is alert and oriented to person, place, and time. Mental status is at baseline. Psychiatric:         Mood and Affect: Mood normal.         Thought Content: Thought content normal.           On this date 01/09/21 I have spent 20 minutes reviewing previous notes, test results and face to face with the patient discussing the diagnosis and importance of compliance with the treatment plan as well as documenting on the day of the visit.     Phone call from [facility] imaging  Ultrasound of the left lower leg is negative  No dvt, normal bruising from the hematoma from her injury  Reviewed this with the patient  willhave her elevated ove rthe weekend and use moist heat to get the bruising to resolve quicker

## 2021-01-11 ENCOUNTER — HOSPITAL ENCOUNTER (OUTPATIENT)
Age: 51
Discharge: HOME | End: 2021-01-13
Payer: MEDICAID

## 2021-01-11 PROCEDURE — 88305 TISSUE EXAM BY PATHOLOGIST: CPT

## 2021-01-11 PROCEDURE — 87081 CULTURE SCREEN ONLY: CPT

## 2021-01-12 LAB — CLOTEST: NORMAL

## 2021-01-19 LAB
ALBUMIN SERPL-MCNC: 4.2 G/DL (ref 3.5–5.2)
ALP BLD-CCNC: 81 U/L (ref 35–104)
ALT SERPL-CCNC: 19 U/L (ref 0–32)
ANION GAP SERPL CALCULATED.3IONS-SCNC: 14 MMOL/L (ref 7–16)
AST SERPL-CCNC: 28 U/L (ref 0–31)
BASOPHILS ABSOLUTE: 0.06 E9/L (ref 0–0.2)
BASOPHILS RELATIVE PERCENT: 0.8 % (ref 0–2)
BILIRUB SERPL-MCNC: <0.2 MG/DL (ref 0–1.2)
BUN BLDV-MCNC: 8 MG/DL (ref 6–20)
C-REACTIVE PROTEIN: 0.5 MG/DL (ref 0–0.4)
CALCIUM SERPL-MCNC: 9.5 MG/DL (ref 8.6–10.2)
CHLORIDE BLD-SCNC: 97 MMOL/L (ref 98–107)
CO2: 25 MMOL/L (ref 22–29)
CREAT SERPL-MCNC: 0.8 MG/DL (ref 0.5–1)
EOSINOPHILS ABSOLUTE: 0.1 E9/L (ref 0.05–0.5)
EOSINOPHILS RELATIVE PERCENT: 1.4 % (ref 0–6)
GFR AFRICAN AMERICAN: >60
GFR NON-AFRICAN AMERICAN: >60 ML/MIN/1.73
GLUCOSE BLD-MCNC: 94 MG/DL (ref 74–99)
HCT VFR BLD CALC: 39 % (ref 34–48)
HEMOGLOBIN: 13 G/DL (ref 11.5–15.5)
IMMATURE GRANULOCYTES #: 0.02 E9/L
IMMATURE GRANULOCYTES %: 0.3 % (ref 0–5)
LYMPHOCYTES ABSOLUTE: 1.17 E9/L (ref 1.5–4)
LYMPHOCYTES RELATIVE PERCENT: 16.4 % (ref 20–42)
MCH RBC QN AUTO: 32.5 PG (ref 26–35)
MCHC RBC AUTO-ENTMCNC: 33.3 % (ref 32–34.5)
MCV RBC AUTO: 97.5 FL (ref 80–99.9)
MONOCYTES ABSOLUTE: 0.46 E9/L (ref 0.1–0.95)
MONOCYTES RELATIVE PERCENT: 6.5 % (ref 2–12)
NEUTROPHILS ABSOLUTE: 5.32 E9/L (ref 1.8–7.3)
NEUTROPHILS RELATIVE PERCENT: 74.6 % (ref 43–80)
PDW BLD-RTO: 12.7 FL (ref 11.5–15)
PLATELET # BLD: 285 E9/L (ref 130–450)
PMV BLD AUTO: 10.1 FL (ref 7–12)
POTASSIUM SERPL-SCNC: 4 MMOL/L (ref 3.5–5)
RBC # BLD: 4 E12/L (ref 3.5–5.5)
SODIUM BLD-SCNC: 136 MMOL/L (ref 132–146)
TOTAL CK: 91 U/L (ref 20–180)
TOTAL PROTEIN: 6.8 G/DL (ref 6.4–8.3)
WBC # BLD: 7.1 E9/L (ref 4.5–11.5)

## 2021-01-20 LAB — SEDIMENTATION RATE, ERYTHROCYTE: 7 MM/HR (ref 0–20)

## 2021-01-21 LAB — ALDOLASE: 5.1 U/L (ref 1.5–8.1)

## 2021-01-24 DIAGNOSIS — I10 ESSENTIAL HYPERTENSION: ICD-10-CM

## 2021-01-25 ENCOUNTER — OFFICE VISIT (OUTPATIENT)
Dept: NEUROLOGY | Age: 51
End: 2021-01-25
Payer: MEDICAID

## 2021-01-25 VITALS
BODY MASS INDEX: 25.99 KG/M2 | TEMPERATURE: 97.2 F | HEIGHT: 65 IN | SYSTOLIC BLOOD PRESSURE: 144 MMHG | DIASTOLIC BLOOD PRESSURE: 90 MMHG | WEIGHT: 156 LBS

## 2021-01-25 DIAGNOSIS — M33.90 DERMATOMYOSITIS (HCC): ICD-10-CM

## 2021-01-25 DIAGNOSIS — Z86.79 HISTORY OF RAYNAUD'S SYNDROME: ICD-10-CM

## 2021-01-25 DIAGNOSIS — G62.9 NEUROPATHY: ICD-10-CM

## 2021-01-25 DIAGNOSIS — R20.2 PARESTHESIAS: Primary | ICD-10-CM

## 2021-01-25 DIAGNOSIS — Z86.79 HX OF RAYNAUD'S SYNDROME: Chronic | ICD-10-CM

## 2021-01-25 PROCEDURE — G8419 CALC BMI OUT NRM PARAM NOF/U: HCPCS | Performed by: PSYCHIATRY & NEUROLOGY

## 2021-01-25 PROCEDURE — G8427 DOCREV CUR MEDS BY ELIG CLIN: HCPCS | Performed by: PSYCHIATRY & NEUROLOGY

## 2021-01-25 PROCEDURE — 99204 OFFICE O/P NEW MOD 45 MIN: CPT | Performed by: PSYCHIATRY & NEUROLOGY

## 2021-01-25 PROCEDURE — 4004F PT TOBACCO SCREEN RCVD TLK: CPT | Performed by: PSYCHIATRY & NEUROLOGY

## 2021-01-25 PROCEDURE — G8482 FLU IMMUNIZE ORDER/ADMIN: HCPCS | Performed by: PSYCHIATRY & NEUROLOGY

## 2021-01-25 RX ORDER — ASPIRIN 81 MG/1
81 TABLET ORAL DAILY
COMMUNITY

## 2021-01-25 RX ORDER — AMLODIPINE BESYLATE AND BENAZEPRIL HYDROCHLORIDE 5; 10 MG/1; MG/1
CAPSULE ORAL
Qty: 30 CAPSULE | Refills: 5 | Status: SHIPPED
Start: 2021-01-25 | End: 2021-07-08

## 2021-01-25 ASSESSMENT — ENCOUNTER SYMPTOMS
EYES NEGATIVE: 1
GASTROINTESTINAL NEGATIVE: 1
RESPIRATORY NEGATIVE: 1
ALLERGIC/IMMUNOLOGIC NEGATIVE: 1

## 2021-01-25 NOTE — TELEPHONE ENCOUNTER
Last Appointment:  1/7/2021  Future Appointments   Date Time Provider Yanet Portilloi   1/25/2021  1:00 PM Opal Brower MD [facility]   7/8/2021 10:20 AM Chu Medina  W [address] [place] [address]

## 2021-01-25 NOTE — PROGRESS NOTES
Mi-2 Antibody Negative Negative   PL-7 Antibody Negative Negative   PL-12 Antibody Negative Negative   P155/140 Antibody Negative Negative   EJ Antibody Negative Negative   SRP Antibody Negative Negative   OJ Antibody Negative Negative   SAE1 Antibody Negative Negative   NXP-2 Antibody Negative Negative   MDA5 Antibody Negative Negative   TIF-1 gamma Antibody Negative Negative   Myositis Interpretation SEE NOTE   Protein, Total 6.3 - 8.0 g/dL 7.0   Albumin 3.37 - 4.23 gm/dL 4.19   Alpha 1 Globulin 0.18 - 0.31 gm/dL 0.23   Alpha 2 Globulin 0.52 - 0.97 gm/dL 0.79   Beta Globulin 0.84 - 1.36 gm/dL 0.99   Gamma Globulin 0.70 - 1.44 gm/dL 0.79   Interpretation (Prot Electro) SEE COMMENT   M-Protein Location N/A   M-Protein Concentration 0.00 gm/dL 0.00     Lab Data: Reviewed from 1/19/2020. No significant findings. Normal aldolase level 5.1, sed rate 7. NCS/EMG study results reviewed from 6/9/2020, Dr. Rufino Sever: Normal study. No electrodiagnostic evidence of any peripheral nerve mononeuropathy, plexopathy, right lumbar motor radiculopathy or peripheral polyneuropathy in bilateral lower extremities. Pure sensory radiculopathy or small fiber neuropathy cannot be evaluated by means of electrodiagnostic techniques. Imaging: MR lumbar spine without contrast, 5/18/2017. Multilevel degenerative disc disease and facet osteoarthropathy, T12-S1, worse at T12-L1 segment, disc protrusion, left paracentral.    Current Outpatient Medications   Medication Sig Dispense Refill    aspirin 81 MG EC tablet Take 81 mg by mouth daily      busPIRone (BUSPAR) 7.5 MG tablet take 1 tablet by mouth twice a day      hydrocortisone 2.5 % cream Apply twice a day to active rash on face.       predniSONE (DELTASONE) 5 MG tablet Take 5 mg by mouth daily       cyclobenzaprine (FLEXERIL) 10 MG tablet Take 1 tablet by mouth nightly as needed for Muscle spasms 30 tablet 5  FLUoxetine (PROZAC) 20 MG capsule Take 1 capsule by mouth every morning and 2 capsules every evening. 90 capsule 5    triamcinolone (KENALOG) 0.1 % cream Apply twice a day to active rash on hands and elbow.  amLODIPine-benazepril (LOTREL) 5-10 MG per capsule take 1 capsule by mouth once daily 30 capsule 5    omeprazole (PRILOSEC) 20 MG delayed release capsule Take 1 capsule by mouth Daily 30 capsule 5    Multiple Vitamins-Minerals (CENTRA-JACOBY PO) Take by mouth daily      Lifitegrast (XIIDRA) 5 % SOLN Apply to eye 2 times daily       Flaxseed, Linseed, (BL FLAX SEED OIL PO) Take by mouth daily      hydroxychloroquine (PLAQUENIL) 200 MG tablet Take 400 mg by mouth daily       Omega-3 Fatty Acids (FISH OIL) 1000 MG CAPS Take 3,000 mg by mouth daily       hypromellose (ARTIFICIAL TEARS) 0.4 % SOLN ophthalmic solution Place 1 drop into both eyes every 4 hours as needed (dry eyes) 15 mL 0    vitamin D (CHOLECALCIFEROL) 1000 UNIT TABS tablet Take 1,000 Units by mouth every morning       Multiple Vitamins-Minerals (HAIR/SKIN/NAILS PO) Take 1 tablet by mouth every morning       B Complex Vitamins (VITAMIN B COMPLEX PO) Take 1 tablet by mouth every morning       CALCIUM-VITAMIN D PO Take 1 tablet by mouth every morning       nicotine (NICODERM CQ) 14 MG/24HR Place 1 patch onto the skin daily (Patient not taking: Reported on 1/25/2021) 42 patch 0     No current facility-administered medications for this visit.         Allergies   Allergen Reactions    Penicillins     Bactrim [Sulfamethoxazole-Trimethoprim] Nausea And Vomiting and Other (See Comments)    Levaquin [Levofloxacin] Hives and Rash    Macrobid [Nitrofurantoin] Rash       Patient Active Problem List   Diagnosis    Anxiety    HTN (hypertension)    Raynaud's phenomenon    Chronic daily headache    Leukocytosis    Cigarette smoker    Depression    Elevated LFTs    Alcohol-induced acute pancreatitis without infection or necrosis  Peripheral neuropathy       Past Medical History:   Diagnosis Date    Anxiety     Depression     Herniated disc     Hypertension     Peripheral neuropathy 1/25/2021    Pneumonia     Raynaud's disease     TMJ (dislocation of temporomandibular joint)        Past Surgical History:   Procedure Laterality Date    HYSTERECTOMY      OVARIAN CYST REMOVAL      benign mass removal    SKIN BIOPSY Left     left hand       Family History   Problem Relation Age of Onset    Heart Disease Father         CHF       Social History     Socioeconomic History    Marital status:      Spouse name: Not on file    Number of children: Not on file    Years of education: Not on file    Highest education level: Not on file   Occupational History    Not on file   Social Needs    Financial resource strain: Not on file    Food insecurity     Worry: Not on file     Inability: Not on file    Transportation needs     Medical: Not on file     Non-medical: Not on file   Tobacco Use    Smoking status: Current Every Day Smoker     Packs/day: 0.25     Years: 30.00     Pack years: 7.50     Types: Cigarettes    Smokeless tobacco: Never Used   Substance and Sexual Activity    Alcohol use: Yes     Frequency: 2-3 times a week     Drinks per session: 3 or 4     Comment: occasional    Drug use: No    Sexual activity: Not on file   Lifestyle    Physical activity     Days per week: Not on file     Minutes per session: Not on file    Stress: Not on file   Relationships    Social connections     Talks on phone: Not on file     Gets together: Not on file     Attends Buddhist service: Not on file     Active member of club or organization: Not on file     Attends meetings of clubs or organizations: Not on file     Relationship status: Not on file    Intimate partner violence     Fear of current or ex partner: Not on file     Emotionally abused: Not on file     Physically abused: Not on file Forced sexual activity: Not on file   Other Topics Concern    Not on file   Social History Narrative    Not on file     Review of Systems   Constitutional: Negative. HENT: Negative. Eyes: Negative. Respiratory: Negative. Cardiovascular: Negative. Gastrointestinal: Negative. Endocrine: Negative. Genitourinary: Negative. Musculoskeletal: Positive for arthralgias and myalgias. Hx Raynaud's; dermatomyositis   Skin: Negative. Allergic/Immunologic: Negative. Neurological: Positive for numbness. Hematological: Negative. Psychiatric/Behavioral: Negative. Neurologic Exam:  BP (!) 144/90 (Site: Right Upper Arm, Position: Sitting, Cuff Size: Medium Adult)   Temp 97.2 °F (36.2 °C)   Ht 5' 5\" (1.651 m)   Wt 156 lb (70.8 kg)   BMI 25.96 kg/m²   General appearance: Alert, cooperative, anxious, well nourished, well groomed, seated on the exam table, no acute distress. HEENT: Normocephalic/atraumatic. Neck: Supple  Cardiac: RRR  Respiratory: grossly clear  Extremities: No edema, erythema or cyanosis. Skin: No apparent lesions or rashes at this time. Musculoskeletal: Negative bilateral straight leg raising test, no fasciculations, tremors or foot drop. No focal muscular atrophy.   Mental Status: Alert, oriented x4  Speech/Language: Clear, grossly fluent  Attention span/Concentration: Grossly intact  Affect/Mood: Mildly anxious  Insight/Judgement: Fairly good     Fund of Knowledge/Current events: Grossly intact  CN II-XII:     Pupils: Equal, reactive to light, 3.0 mm     EOM's: Full without nystagmus  Visual Fields: Full to confrontation  Fundi: Miosis to light, grossly unremarkable  CN V: normal V1-V3  CN VII: No facial droop  CN VIII: Hearing grossly intact  CN IX-XII: Tongue midline  SCM/Trapezii: 5/5 power Motor: 5/5 power in the upper and lower extremities without tremor or drift and normal motor tone without cogwheeling or spasticity, intact fine motor function of both hands, symmetric. DTR's: 1+ and symmetric in the upper and lower extremities, no ankle clonus, plantar responses are flexor. Sensory: Grossly intact subjectively to light touch and sharp stick testing. Denies a stocking/glove distribution of sensory loss pattern. Decreased cold temperature sensation reported distally in the lower and upper extremities. Mildly reduced vibratory sensation at the ankles. Coordination/Gait: No gross limb dysmetria, truncal or cerebellar gait ataxia, two-step turns. Assessment/Plan:  1. History of dermatomyositis treated with prednisone and methotrexate, and previously hydroxychloroquine. 2.  Possible small fiber sensory neuropathy underlying the above but apparently this is a controversial subject in dermatomyositis patients for various reasons. 3.  History of prednisone exposure which may induce a mild neuropathy of glucose impairment. 4.  Hydroxychloroquine may also contribute to a peripheral neuropathy. 5.  She is referred to [facility], Department of Neuromuscular Medicine, for further evaluation and assessment. Perhaps a sural nerve biopsy and/or muscle biopsy may be considered. 6.  Patient information was provided from the [employer]urop[employer] website and NIH. Sincerely,      Adrienne Ward MD    This note was created using speech recognition transcription software. Despite proofreading, there may be several typographical errors present that may affect the meaning of the content. Please call with any questions. Note: More than 50% of this 40-minute face-face visit time included counseling and coordination of care based on clinical impression, review of test results, treatment plan, risk factor reduction and patient and/or family education.     Orders Placed This Encounter Procedures    External Referral To Neurology     Referral Priority:   Routine     Referral Type:   Eval and Treat     Referral Reason:   Specialty Services Required     Referral Location[facility]   [facility]     Requested Specialty:   Neurology     Number of Visits Requested:   1

## 2021-03-24 ENCOUNTER — OFFICE VISIT (OUTPATIENT)
Dept: FAMILY MEDICINE CLINIC | Age: 51
End: 2021-03-24
Payer: MEDICAID

## 2021-03-24 VITALS
HEART RATE: 94 BPM | BODY MASS INDEX: 26.63 KG/M2 | SYSTOLIC BLOOD PRESSURE: 138 MMHG | OXYGEN SATURATION: 97 % | WEIGHT: 160 LBS | DIASTOLIC BLOOD PRESSURE: 88 MMHG | TEMPERATURE: 98.4 F

## 2021-03-24 DIAGNOSIS — L56.8 PHOTOSENSITIVITY DERMATITIS: Primary | ICD-10-CM

## 2021-03-24 PROCEDURE — 99213 OFFICE O/P EST LOW 20 MIN: CPT | Performed by: FAMILY MEDICINE

## 2021-03-24 PROCEDURE — G8482 FLU IMMUNIZE ORDER/ADMIN: HCPCS | Performed by: FAMILY MEDICINE

## 2021-03-24 PROCEDURE — G8427 DOCREV CUR MEDS BY ELIG CLIN: HCPCS | Performed by: FAMILY MEDICINE

## 2021-03-24 PROCEDURE — G8419 CALC BMI OUT NRM PARAM NOF/U: HCPCS | Performed by: FAMILY MEDICINE

## 2021-03-24 PROCEDURE — 4004F PT TOBACCO SCREEN RCVD TLK: CPT | Performed by: FAMILY MEDICINE

## 2021-03-24 RX ORDER — PIMECROLIMUS 10 MG/G
CREAM TOPICAL
COMMUNITY
Start: 2021-02-03 | End: 2021-04-30

## 2021-03-24 RX ORDER — PREDNISONE 1 MG/1
1 TABLET ORAL DAILY
Status: ON HOLD | COMMUNITY
Start: 2021-03-19 | End: 2021-05-03 | Stop reason: HOSPADM

## 2021-03-24 SDOH — ECONOMIC STABILITY: FOOD INSECURITY: WITHIN THE PAST 12 MONTHS, THE FOOD YOU BOUGHT JUST DIDN'T LAST AND YOU DIDN'T HAVE MONEY TO GET MORE.: NEVER TRUE

## 2021-03-24 SDOH — ECONOMIC STABILITY: FOOD INSECURITY: WITHIN THE PAST 12 MONTHS, YOU WORRIED THAT YOUR FOOD WOULD RUN OUT BEFORE YOU GOT MONEY TO BUY MORE.: NEVER TRUE

## 2021-03-24 SDOH — ECONOMIC STABILITY: TRANSPORTATION INSECURITY
IN THE PAST 12 MONTHS, HAS THE LACK OF TRANSPORTATION KEPT YOU FROM MEDICAL APPOINTMENTS OR FROM GETTING MEDICATIONS?: NO

## 2021-03-24 SDOH — ECONOMIC STABILITY: INCOME INSECURITY: HOW HARD IS IT FOR YOU TO PAY FOR THE VERY BASICS LIKE FOOD, HOUSING, MEDICAL CARE, AND HEATING?: NOT HARD AT ALL

## 2021-03-24 NOTE — PROGRESS NOTES
[facility]  Office Progress Note - Dr. Betzaida Rasheed  3/24/21    CC:   Chief Complaint   Patient presents with    Rash     on face        HPI: having a flare of underlying process today  Seems to be sun sensitive. They are weaning prednisone and plaquenil and use just ointments for treatment. [facility] referred her to [facility] clinic dermatology work-up. The dermatology team thinks that she is dealing with contact dermatitis which is very frustrating to her because it does not explain her dry eyes and dry mouth. She is supposed to have testing for contact dermatitis, but has to wait until at least June for this due to scheduling. She is quite frustrated. She came in today because she has increased rash on her face, chest, arms, feet. These are all sun exposed areas today and she was outside in the nice weather for about 20 minutes when the rash started to worsen. Images are associated in the media tab that I took today. She feels MTX did seem to improve symptoms - dry eyes were better, mouth was not as dry, the rash was less frequent. She has noted that has bene having some thigh pain over the past couple months as well and feeling some weakness when rising off of the toilet.     _________________________________________________________    Assessment / Ardath Bridges was seen today for rash. Diagnoses and all orders for this visit:    Photosensitivity dermatitis    She seems to be having a flare of her underlying process today. This does seem to be on sun exposed areas only. [facility] dermat[facility] and rheumatology. She feels like her symptoms have worsened since coming off of methotrexate, though the dermatology team notes that methotrexate could help with dermatitis. They feel that they are likely safer ways to manage this. She is being asked to wait even longer to have further testing. This is frustrating to her.   Discussed that she could get a third opinion from rheumatology in [place] or [place] to see if they would prescribe the methotrexate for her which helped her feel better. Otherwise encouraged her to call [facility] dermatology every couple of weeks and see if they have any cancellations. I have nothing further to offer at this time it has not already been done. Encouraged her to wear clothing that has UV blocking properties and to wear sunscreen when she is sun exposed. Keep regular follow-up or as scheduled. Patient counseled to follow up sooner or seek more acute care if symptoms worsening or not improving according to plan. Electronically signed by Eliud Quintero MD on 3/24/2021    _________________________________________________________  Current Outpatient Medications on File Prior to Visit   Medication Sig Dispense Refill    hydrocortisone 2.5 % ointment apply to affected area ON face twice a day (DO NOT PUT INSIDE EYES)      pimecrolimus (ELIDEL) 1 % cream apply to affected area twice a day      predniSONE (DELTASONE) 1 MG tablet take 4 tablets by mouth once daily      busPIRone (BUSPAR) 7.5 MG tablet take 1 tablet by mouth twice a day 60 tablet 5    amLODIPine-benazepril (LOTREL) 5-10 MG per capsule take 1 capsule by mouth once daily 30 capsule 5    aspirin 81 MG EC tablet Take 81 mg by mouth daily      hydrocortisone 2.5 % cream Apply twice a day to active rash on face.  cyclobenzaprine (FLEXERIL) 10 MG tablet Take 1 tablet by mouth nightly as needed for Muscle spasms 30 tablet 5    FLUoxetine (PROZAC) 20 MG capsule Take 1 capsule by mouth every morning and 2 capsules every evening.  90 capsule 5    nicotine (NICODERM CQ) 14 MG/24HR Place 1 patch onto the skin daily (Patient not taking: Reported on 1/25/2021) 42 patch 0    omeprazole (PRILOSEC) 20 MG delayed release capsule Take 1 capsule by mouth Daily 30 capsule 5    Multiple Vitamins-Minerals (CENTRA-JACOBY PO) Take by mouth daily      Lifitegrast (XIIDRA) 5 % SOLN Apply to eye 2 times daily       Flaxseed, Linseed, (BL FLAX SEED OIL PO) Take by mouth daily      hydroxychloroquine (PLAQUENIL) 200 MG tablet Take 400 mg by mouth daily       Omega-3 Fatty Acids (FISH OIL) 1000 MG CAPS Take 3,000 mg by mouth daily       hypromellose (ARTIFICIAL TEARS) 0.4 % SOLN ophthalmic solution Place 1 drop into both eyes every 4 hours as needed (dry eyes) 15 mL 0    vitamin D (CHOLECALCIFEROL) 1000 UNIT TABS tablet Take 1,000 Units by mouth every morning       Multiple Vitamins-Minerals (HAIR/SKIN/NAILS PO) Take 1 tablet by mouth every morning       B Complex Vitamins (VITAMIN B COMPLEX PO) Take 1 tablet by mouth every morning       CALCIUM-VITAMIN D PO Take 1 tablet by mouth every morning        No current facility-administered medications on file prior to visit.         Patient Active Problem List   Diagnosis Code    Anxiety F41.9    HTN (hypertension) I10    Raynaud's phenomenon I73.00    Chronic daily headache R51.9    Leukocytosis D72.829    Cigarette smoker F17.210    Depression F32.9    Elevated LFTs R79.89    Alcohol-induced acute pancreatitis without infection or necrosis K85.20    Peripheral neuropathy G62.9    Hx of Raynaud's syndrome Z86.79     _________________________________________________________  Past Medical History:   Diagnosis Date    Anxiety     Depression     Herniated disc     Hx of Raynaud's syndrome 1/25/2021    Hypertension     Peripheral neuropathy 1/25/2021    Pneumonia     Raynaud's disease     TMJ (dislocation of temporomandibular joint)        Family History   Problem Relation Age of Onset    Heart Disease Father         CHF       Past Surgical History:   Procedure Laterality Date    HYSTERECTOMY      OVARIAN CYST REMOVAL      benign mass removal    SKIN BIOPSY Left     left hand       Social History     Tobacco Use    Smoking status: Current Every Day Smoker     Packs/day: 0.25 Years: 30.00     Pack years: 7.50     Types: Cigarettes    Smokeless tobacco: Never Used   Substance Use Topics    Alcohol use: Yes     Frequency: 2-3 times a week     Drinks per session: 3 or 4     Comment: occasional    Drug use: No       Chart reviewed and updated where appropriate for PMH, Fam, and Soc Hx.  _________________________________________________________  ROS: POSITIVE: As in the HPI. Otherwise Pertinent negatives are negative.    __________________________________________________________  Physical Exam   /88   Pulse 94   Temp 98.4 °F (36.9 °C)   Wt 160 lb (72.6 kg)   SpO2 97%   BMI 26.63 kg/m²   Wt Readings from Last 3 Encounters:   03/24/21 160 lb (72.6 kg)   01/25/21 156 lb (70.8 kg)   01/09/21 156 lb (70.8 kg)       Constitutional:    She is oriented to person, place, and time. She appears well-developed and well-nourished. Cardiovascular:    Normal rate, regular rhythm and normal heart sounds. No murmur. No gallop and no friction rub. Pulmonary/Chest:    Effort normal and breath sounds normal.    No wheezes. No rales or rhonchi. Abdominal:    Soft. Bowel sounds are normal.    No distension. No tenderness. Skin:    Skin is warm and dry. She has an erythematous blanching rash on her cheeks, forehead, chest, and arms. These are all sun exposed areas and the rashes not present under her clothing. See media tab for images. Psychiatric:    She has a normal mood and affect. Normal groom and dress. No SI or HI.   ________________________________________________________    This note may have been created using dictation software.  Efforts were made to reduce grammatical or syntax errors, but some may persist.

## 2021-03-24 NOTE — PATIENT INSTRUCTIONS
[facility]   [address], [place] [facility] - [facility], [facility]

## 2021-04-28 DIAGNOSIS — F41.9 ANXIETY: ICD-10-CM

## 2021-04-28 NOTE — TELEPHONE ENCOUNTER
Last Appointment:  3/24/2021  Future Appointments   Date Time Provider Yanet Quiroz   7/8/2021 10:20 AM Matt Robb  [address] [facility] [address]

## 2021-04-29 RX ORDER — FLUOXETINE HYDROCHLORIDE 20 MG/1
CAPSULE ORAL
Qty: 90 CAPSULE | Refills: 5 | Status: SHIPPED
Start: 2021-04-29 | End: 2021-12-28

## 2021-04-29 RX ORDER — FLUOXETINE HYDROCHLORIDE 20 MG/1
CAPSULE ORAL
Qty: 90 CAPSULE | Refills: 1 | Status: SHIPPED
Start: 2021-04-29 | End: 2021-04-29

## 2021-04-30 ENCOUNTER — OFFICE VISIT (OUTPATIENT)
Dept: FAMILY MEDICINE CLINIC | Age: 51
End: 2021-04-30
Payer: MEDICAID

## 2021-04-30 VITALS
OXYGEN SATURATION: 98 % | SYSTOLIC BLOOD PRESSURE: 128 MMHG | HEART RATE: 101 BPM | TEMPERATURE: 97.1 F | HEIGHT: 65 IN | WEIGHT: 157 LBS | DIASTOLIC BLOOD PRESSURE: 82 MMHG | BODY MASS INDEX: 26.16 KG/M2

## 2021-04-30 DIAGNOSIS — R10.13 EPIGASTRIC ABDOMINAL PAIN: Primary | ICD-10-CM

## 2021-04-30 PROCEDURE — G8427 DOCREV CUR MEDS BY ELIG CLIN: HCPCS | Performed by: FAMILY MEDICINE

## 2021-04-30 PROCEDURE — 99214 OFFICE O/P EST MOD 30 MIN: CPT | Performed by: FAMILY MEDICINE

## 2021-04-30 PROCEDURE — G8419 CALC BMI OUT NRM PARAM NOF/U: HCPCS | Performed by: FAMILY MEDICINE

## 2021-04-30 PROCEDURE — 4004F PT TOBACCO SCREEN RCVD TLK: CPT | Performed by: FAMILY MEDICINE

## 2021-04-30 PROCEDURE — 93000 ELECTROCARDIOGRAM COMPLETE: CPT | Performed by: FAMILY MEDICINE

## 2021-04-30 RX ORDER — TRIAMCINOLONE ACETONIDE 1 MG/G
CREAM TOPICAL
COMMUNITY
Start: 2021-04-09

## 2021-04-30 NOTE — PROGRESS NOTES
[facility]  Office Progress Note - Dr. Paula Coles  4/30/21    CC:   Chief Complaint   Patient presents with   [facility]     Abdominal pain. Increased bowel movements 4 yesterday        /82 (Site: Left Upper Arm, Position: Sitting, Cuff Size: Large Adult)   Pulse 101   Temp 97.1 °F (36.2 °C) (Temporal)   Ht 5' 5\" (1.651 m)   Wt 157 lb (71.2 kg)   SpO2 98%   BMI 26.13 kg/m²   Wt Readings from Last 3 Encounters:   04/30/21 157 lb (71.2 kg)   03/24/21 160 lb (72.6 kg)   01/25/21 156 lb (70.8 kg)       HPI: symptoms started this morning  Epigastric abd pains, bloating, feels like her pancreatitis did the last time. Seems to come in waves, eases up, ends up coming back. She had 4 BMs yday - unusual for her, but not diarrhea. No blood. Crackers at lunch time, granola bar this am did not sit well (but the crackers went ok). Couple cups of coffee this Am then water rest of day. Had 1 beer yesterday, none weds, 5 beers or so on Tuesday, maybe 1 Monday (\"I really havent drank mch this week\"  Normally about 12-16 beers per week, which is down from previous about 24 per week. little bit of back pressure with this. Normal Bm today. Tried pepto without relief yet. Took her normal medicines. _________________________________________________________    Assessment / Lenore White was seen today for bloated. Diagnoses and all orders for this visit:    Epigastric abdominal pain  -     CBC Auto Differential; Future  -     Comprehensive Metabolic Panel; Future  -     LIPASE; Future  -     Urinalysis; Future  -     EKG 12 lead; Future    suspicious for pancreatitis episode again. Has been drinking somewhat regularly, though cut back. EKG reviewed and shows NSR with normal axis and good r-wave progression, ST elevations or depressions, No Q-waves. Possible LAE. Normal EKG. Labs to further assess. Feels manageable at home right now. Pain tolerable. No nausea.    If so, water only for rest of day, clear pop if needs to. Advance to clear liquid diet tomorrow with chicken broth, etc.   Soft solids on Sunday if tolerating. No alcohol. Follow up early next week. To Ed sooner if worsening or pain not controlled or unable to keep water down. Follow up early next week or as scheduled. Patient counseled to follow up sooner or seek more acute care if symptoms worsening or not improving according to plan. Electronically signed by Annamaria Au MD on 4/30/2021    _________________________________________________________  Current Outpatient Medications on File Prior to Visit   Medication Sig Dispense Refill    triamcinolone (KENALOG) 0.1 % cream Apply twice a day to active rash on hands and elbow.  FLUoxetine (PROZAC) 20 MG capsule take 1 capsule by mouth every morning 2 capsules every evening 90 capsule 5    hydrocortisone 2.5 % ointment apply to affected area ON face twice a day (DO NOT PUT INSIDE EYES)      predniSONE (DELTASONE) 1 MG tablet 1 mg daily       busPIRone (BUSPAR) 7.5 MG tablet take 1 tablet by mouth twice a day 60 tablet 5    amLODIPine-benazepril (LOTREL) 5-10 MG per capsule take 1 capsule by mouth once daily 30 capsule 5    aspirin 81 MG EC tablet Take 81 mg by mouth daily      hydrocortisone 2.5 % cream Apply twice a day to active rash on face.       cyclobenzaprine (FLEXERIL) 10 MG tablet Take 1 tablet by mouth nightly as needed for Muscle spasms 30 tablet 5    omeprazole (PRILOSEC) 20 MG delayed release capsule Take 1 capsule by mouth Daily 30 capsule 5    Multiple Vitamins-Minerals (CENTRA-JACOBY PO) Take by mouth daily      Lifitegrast (XIIDRA) 5 % SOLN Apply to eye 2 times daily       Flaxseed, Linseed, (BL FLAX SEED OIL PO) Take by mouth daily      hydroxychloroquine (PLAQUENIL) 200 MG tablet Take 400 mg by mouth daily       Omega-3 Fatty Acids (FISH OIL) 1000 MG CAPS Take 3,000 mg by mouth daily       hypromellose (ARTIFICIAL TEARS) 0.4 % SOLN ophthalmic solution Place 1 drop into both eyes every 4 hours as needed (dry eyes) 15 mL 0    vitamin D (CHOLECALCIFEROL) 1000 UNIT TABS tablet Take 1,000 Units by mouth every morning       Multiple Vitamins-Minerals (HAIR/SKIN/NAILS PO) Take 1 tablet by mouth every morning       B Complex Vitamins (VITAMIN B COMPLEX PO) Take 1 tablet by mouth every morning       CALCIUM-VITAMIN D PO Take 1 tablet by mouth every morning        No current facility-administered medications on file prior to visit.         Patient Active Problem List   Diagnosis Code    Anxiety F41.9    HTN (hypertension) I10    Raynaud's phenomenon I73.00    Chronic daily headache R51.9    Leukocytosis D72.829    Cigarette smoker F17.210    Depression F32.9    Elevated LFTs R79.89    Alcohol-induced acute pancreatitis without infection or necrosis K85.20    Peripheral neuropathy G62.9    Hx of Raynaud's syndrome Z86.79     _________________________________________________________  Past Medical History:   Diagnosis Date    Anxiety     Depression     Herniated disc     Hx of Raynaud's syndrome 1/25/2021    Hypertension     Peripheral neuropathy 1/25/2021    Pneumonia     Raynaud's disease     TMJ (dislocation of temporomandibular joint)        Family History   Problem Relation Age of Onset    Heart Disease Father         CHF       Past Surgical History:   Procedure Laterality Date    HYSTERECTOMY      OVARIAN CYST REMOVAL      benign mass removal    SKIN BIOPSY Left     left hand       Social History     Tobacco Use    Smoking status: Current Every Day Smoker     Packs/day: 0.25     Years: 30.00     Pack years: 7.50     Types: Cigarettes    Smokeless tobacco: Never Used   Substance Use Topics    Alcohol use: Yes     Frequency: 2-3 times a week     Drinks per session: 3 or 4     Comment: occasional    Drug use: No       Chart reviewed and updated where appropriate for PMH, Fam, and Soc Hx.  _________________________________________________________  ROS: POSITIVE: As in the HPI. Otherwise Pertinent negatives are negative.    __________________________________________________________  Physical Exam   Constitutional:    She is oriented to person, place, and time. She appears well-developed and well-nourished. Cardiovascular:    Normal rate, regular rhythm and normal heart sounds. No murmur. No gallop and no friction rub. Pulmonary/Chest:    Effort normal and breath sounds normal.    No wheezes. No rales or rhonchi. Abdominal:    Soft. Bowel sounds are normal.    No distension. Most tender in the epigastrium but also some RUQ ttp, less luq ttp. No lower ttp. No rigidity, guarding. No rebound. Musculoskeletal:    Normal range of motion. No joint swelling noted. No peripheral edema. Skin:    Skin is warm and dry. No rashes, No lesions. Psychiatric:    She has a normal mood and affect. Normal groom and dress. No SI or HI.   ________________________________________________________    This note may have been created using dictation software.  Efforts were made to reduce errors, but some may persist.

## 2021-05-01 ENCOUNTER — HOSPITAL ENCOUNTER (INPATIENT)
Age: 51
LOS: 1 days | Discharge: HOME | DRG: 282 | End: 2021-05-03
Attending: EMERGENCY MEDICINE | Admitting: FAMILY MEDICINE
Payer: MEDICAID

## 2021-05-01 ENCOUNTER — APPOINTMENT (OUTPATIENT)
Dept: CT IMAGING | Age: 51
DRG: 282 | End: 2021-05-01
Payer: MEDICAID

## 2021-05-01 DIAGNOSIS — K85.90 ACUTE PANCREATITIS, UNSPECIFIED COMPLICATION STATUS, UNSPECIFIED PANCREATITIS TYPE: Primary | ICD-10-CM

## 2021-05-01 LAB
ALBUMIN SERPL-MCNC: 4 G/DL (ref 3.5–5.2)
ALP BLD-CCNC: 91 U/L (ref 35–104)
ALT SERPL-CCNC: 18 U/L (ref 0–32)
ANION GAP SERPL CALCULATED.3IONS-SCNC: 11 MMOL/L (ref 7–16)
AST SERPL-CCNC: 29 U/L (ref 0–31)
BACTERIA: NORMAL /HPF
BASOPHILS ABSOLUTE: 0.07 E9/L (ref 0–0.2)
BASOPHILS RELATIVE PERCENT: 0.5 % (ref 0–2)
BILIRUB SERPL-MCNC: 0.3 MG/DL (ref 0–1.2)
BILIRUBIN URINE: NEGATIVE
BLOOD, URINE: ABNORMAL
BUN BLDV-MCNC: 7 MG/DL (ref 6–20)
CALCIUM SERPL-MCNC: 8.8 MG/DL (ref 8.6–10.2)
CHLORIDE BLD-SCNC: 94 MMOL/L (ref 98–107)
CLARITY: CLEAR
CO2: 22 MMOL/L (ref 22–29)
COLOR: YELLOW
CREAT SERPL-MCNC: 0.5 MG/DL (ref 0.5–1)
EOSINOPHILS ABSOLUTE: 0.23 E9/L (ref 0.05–0.5)
EOSINOPHILS RELATIVE PERCENT: 1.5 % (ref 0–6)
EPITHELIAL CELLS, UA: NORMAL /HPF
GFR AFRICAN AMERICAN: >60
GFR NON-AFRICAN AMERICAN: >60 ML/MIN/1.73
GLUCOSE BLD-MCNC: 127 MG/DL (ref 74–99)
GLUCOSE URINE: NEGATIVE MG/DL
HCT VFR BLD CALC: 36.6 % (ref 34–48)
HEMOGLOBIN: 12.7 G/DL (ref 11.5–15.5)
IMMATURE GRANULOCYTES #: 0.06 E9/L
IMMATURE GRANULOCYTES %: 0.4 % (ref 0–5)
KETONES, URINE: NEGATIVE MG/DL
LEUKOCYTE ESTERASE, URINE: ABNORMAL
LIPASE: 87 U/L (ref 13–60)
LYMPHOCYTES ABSOLUTE: 1.21 E9/L (ref 1.5–4)
LYMPHOCYTES RELATIVE PERCENT: 7.9 % (ref 20–42)
MCH RBC QN AUTO: 32.2 PG (ref 26–35)
MCHC RBC AUTO-ENTMCNC: 34.7 % (ref 32–34.5)
MCV RBC AUTO: 92.7 FL (ref 80–99.9)
MONOCYTES ABSOLUTE: 1.42 E9/L (ref 0.1–0.95)
MONOCYTES RELATIVE PERCENT: 9.3 % (ref 2–12)
NEUTROPHILS ABSOLUTE: 12.28 E9/L (ref 1.8–7.3)
NEUTROPHILS RELATIVE PERCENT: 80.4 % (ref 43–80)
NITRITE, URINE: NEGATIVE
PDW BLD-RTO: 12 FL (ref 11.5–15)
PH UA: 6 (ref 5–9)
PLATELET # BLD: 334 E9/L (ref 130–450)
PMV BLD AUTO: 10 FL (ref 7–12)
POTASSIUM REFLEX MAGNESIUM: 3.9 MMOL/L (ref 3.5–5)
PROTEIN UA: NEGATIVE MG/DL
RBC # BLD: 3.95 E12/L (ref 3.5–5.5)
RBC UA: NORMAL /HPF (ref 0–2)
SODIUM BLD-SCNC: 127 MMOL/L (ref 132–146)
SPECIFIC GRAVITY UA: 1.01 (ref 1–1.03)
TOTAL PROTEIN: 6.9 G/DL (ref 6.4–8.3)
UROBILINOGEN, URINE: 0.2 E.U./DL
WBC # BLD: 15.3 E9/L (ref 4.5–11.5)
WBC UA: NORMAL /HPF (ref 0–5)

## 2021-05-01 PROCEDURE — 93005 ELECTROCARDIOGRAM TRACING: CPT | Performed by: EMERGENCY MEDICINE

## 2021-05-01 PROCEDURE — 74177 CT ABD & PELVIS W/CONTRAST: CPT

## 2021-05-01 PROCEDURE — 83690 ASSAY OF LIPASE: CPT

## 2021-05-01 PROCEDURE — 99284 EMERGENCY DEPT VISIT MOD MDM: CPT

## 2021-05-01 PROCEDURE — 2580000003 HC RX 258: Performed by: EMERGENCY MEDICINE

## 2021-05-01 PROCEDURE — 36415 COLL VENOUS BLD VENIPUNCTURE: CPT

## 2021-05-01 PROCEDURE — 96374 THER/PROPH/DIAG INJ IV PUSH: CPT

## 2021-05-01 PROCEDURE — 6360000002 HC RX W HCPCS: Performed by: EMERGENCY MEDICINE

## 2021-05-01 PROCEDURE — 85025 COMPLETE CBC W/AUTO DIFF WBC: CPT

## 2021-05-01 PROCEDURE — 80053 COMPREHEN METABOLIC PANEL: CPT

## 2021-05-01 PROCEDURE — 96376 TX/PRO/DX INJ SAME DRUG ADON: CPT

## 2021-05-01 PROCEDURE — 81001 URINALYSIS AUTO W/SCOPE: CPT

## 2021-05-01 PROCEDURE — 96375 TX/PRO/DX INJ NEW DRUG ADDON: CPT

## 2021-05-01 RX ORDER — ONDANSETRON 2 MG/ML
4 INJECTION INTRAMUSCULAR; INTRAVENOUS ONCE
Status: COMPLETED | OUTPATIENT
Start: 2021-05-01 | End: 2021-05-01

## 2021-05-01 RX ORDER — 0.9 % SODIUM CHLORIDE 0.9 %
1000 INTRAVENOUS SOLUTION INTRAVENOUS ONCE
Status: COMPLETED | OUTPATIENT
Start: 2021-05-01 | End: 2021-05-01

## 2021-05-01 RX ORDER — MORPHINE SULFATE 4 MG/ML
4 INJECTION, SOLUTION INTRAMUSCULAR; INTRAVENOUS
Status: DISCONTINUED | OUTPATIENT
Start: 2021-05-01 | End: 2021-05-02

## 2021-05-01 RX ORDER — MORPHINE SULFATE 4 MG/ML
4 INJECTION, SOLUTION INTRAMUSCULAR; INTRAVENOUS ONCE
Status: COMPLETED | OUTPATIENT
Start: 2021-05-01 | End: 2021-05-01

## 2021-05-01 RX ADMIN — ONDANSETRON 4 MG: 2 INJECTION INTRAMUSCULAR; INTRAVENOUS at 22:04

## 2021-05-01 RX ADMIN — MORPHINE SULFATE 4 MG: 4 INJECTION, SOLUTION INTRAMUSCULAR; INTRAVENOUS at 23:10

## 2021-05-01 RX ADMIN — SODIUM CHLORIDE 1000 ML: 9 INJECTION, SOLUTION INTRAVENOUS at 22:02

## 2021-05-01 RX ADMIN — MORPHINE SULFATE 4 MG: 4 INJECTION, SOLUTION INTRAMUSCULAR; INTRAVENOUS at 22:04

## 2021-05-01 ASSESSMENT — ENCOUNTER SYMPTOMS
WHEEZING: 0
SHORTNESS OF BREATH: 0
NAUSEA: 1
EYE PAIN: 0
VOMITING: 0
ABDOMINAL PAIN: 1
COUGH: 0
BACK PAIN: 0
BLOOD IN STOOL: 0
TROUBLE SWALLOWING: 0
RHINORRHEA: 0
DIARRHEA: 0
CHEST TIGHTNESS: 0

## 2021-05-01 ASSESSMENT — PAIN DESCRIPTION - ONSET: ONSET: ON-GOING

## 2021-05-01 ASSESSMENT — PAIN DESCRIPTION - LOCATION: LOCATION: ABDOMEN

## 2021-05-02 PROBLEM — M33.90 DERMATOMYOSITIS (HCC): Status: ACTIVE | Noted: 2021-05-02

## 2021-05-02 PROBLEM — M33.13 DERMATOMYOSITIS (HCC): Status: ACTIVE | Noted: 2021-05-02

## 2021-05-02 PROBLEM — Z72.0 TOBACCO USE: Status: ACTIVE | Noted: 2021-05-02

## 2021-05-02 LAB
ALBUMIN SERPL-MCNC: 4.2 G/DL (ref 3.5–5.2)
ALP BLD-CCNC: 90 U/L (ref 35–104)
ALT SERPL-CCNC: 16 U/L (ref 0–32)
ANION GAP SERPL CALCULATED.3IONS-SCNC: 12 MMOL/L (ref 7–16)
AST SERPL-CCNC: 21 U/L (ref 0–31)
BILIRUB SERPL-MCNC: 0.4 MG/DL (ref 0–1.2)
BUN BLDV-MCNC: 5 MG/DL (ref 6–20)
CALCIUM SERPL-MCNC: 8.8 MG/DL (ref 8.6–10.2)
CHLORIDE BLD-SCNC: 99 MMOL/L (ref 98–107)
CHLORIDE URINE RANDOM: 152 MMOL/L
CHOLESTEROL, TOTAL: 178 MG/DL (ref 0–199)
CO2: 22 MMOL/L (ref 22–29)
CREAT SERPL-MCNC: 0.5 MG/DL (ref 0.5–1)
CREATININE URINE: 33 MG/DL (ref 29–226)
EKG ATRIAL RATE: 99 BPM
EKG P-R INTERVAL: 154 MS
EKG Q-T INTERVAL: 372 MS
EKG QRS DURATION: 90 MS
EKG QTC CALCULATION (BAZETT): 477 MS
EKG R AXIS: -167 DEGREES
EKG T AXIS: -150 DEGREES
EKG VENTRICULAR RATE: 99 BPM
GFR AFRICAN AMERICAN: >60
GFR NON-AFRICAN AMERICAN: >60 ML/MIN/1.73
GLUCOSE BLD-MCNC: 126 MG/DL (ref 74–99)
HDLC SERPL-MCNC: 54 MG/DL
LACTATE DEHYDROGENASE: 215 U/L (ref 135–214)
LDL CHOLESTEROL CALCULATED: 116 MG/DL (ref 0–99)
MAGNESIUM: 1.7 MG/DL (ref 1.6–2.6)
OSMOLALITY URINE: 494 MOSM/KG (ref 300–900)
OSMOLALITY: 272 MOSM/KG (ref 285–310)
POTASSIUM REFLEX MAGNESIUM: 3.6 MMOL/L (ref 3.5–5)
POTASSIUM, UR: 43.6 MMOL/L
SODIUM BLD-SCNC: 133 MMOL/L (ref 132–146)
SODIUM URINE: 141 MMOL/L
TOTAL PROTEIN: 6.3 G/DL (ref 6.4–8.3)
TRIGL SERPL-MCNC: 41 MG/DL (ref 0–149)
VLDLC SERPL CALC-MCNC: 8 MG/DL

## 2021-05-02 PROCEDURE — 82436 ASSAY OF URINE CHLORIDE: CPT

## 2021-05-02 PROCEDURE — 36415 COLL VENOUS BLD VENIPUNCTURE: CPT

## 2021-05-02 PROCEDURE — 83615 LACTATE (LD) (LDH) ENZYME: CPT

## 2021-05-02 PROCEDURE — 83735 ASSAY OF MAGNESIUM: CPT

## 2021-05-02 PROCEDURE — 84133 ASSAY OF URINE POTASSIUM: CPT

## 2021-05-02 PROCEDURE — 6360000004 HC RX CONTRAST MEDICATION: Performed by: RADIOLOGY

## 2021-05-02 PROCEDURE — 93010 ELECTROCARDIOGRAM REPORT: CPT | Performed by: INTERNAL MEDICINE

## 2021-05-02 PROCEDURE — 84300 ASSAY OF URINE SODIUM: CPT

## 2021-05-02 PROCEDURE — 6360000002 HC RX W HCPCS: Performed by: FAMILY MEDICINE

## 2021-05-02 PROCEDURE — 99222 1ST HOSP IP/OBS MODERATE 55: CPT | Performed by: FAMILY MEDICINE

## 2021-05-02 PROCEDURE — 82570 ASSAY OF URINE CREATININE: CPT

## 2021-05-02 PROCEDURE — 2060000000 HC ICU INTERMEDIATE R&B

## 2021-05-02 PROCEDURE — 96375 TX/PRO/DX INJ NEW DRUG ADDON: CPT

## 2021-05-02 PROCEDURE — 6370000000 HC RX 637 (ALT 250 FOR IP): Performed by: FAMILY MEDICINE

## 2021-05-02 PROCEDURE — 83935 ASSAY OF URINE OSMOLALITY: CPT

## 2021-05-02 PROCEDURE — 83930 ASSAY OF BLOOD OSMOLALITY: CPT

## 2021-05-02 PROCEDURE — 2580000003 HC RX 258: Performed by: FAMILY MEDICINE

## 2021-05-02 PROCEDURE — 80053 COMPREHEN METABOLIC PANEL: CPT

## 2021-05-02 PROCEDURE — 80061 LIPID PANEL: CPT

## 2021-05-02 PROCEDURE — 6360000002 HC RX W HCPCS: Performed by: EMERGENCY MEDICINE

## 2021-05-02 RX ORDER — FLUOXETINE HYDROCHLORIDE 20 MG/1
40 CAPSULE ORAL NIGHTLY
Status: DISCONTINUED | OUTPATIENT
Start: 2021-05-02 | End: 2021-05-03 | Stop reason: HOSPADM

## 2021-05-02 RX ORDER — SODIUM CHLORIDE 9 MG/ML
INJECTION, SOLUTION INTRAVENOUS CONTINUOUS
Status: DISCONTINUED | OUTPATIENT
Start: 2021-05-02 | End: 2021-05-03 | Stop reason: HOSPADM

## 2021-05-02 RX ORDER — PROMETHAZINE HYDROCHLORIDE 25 MG/1
12.5 TABLET ORAL EVERY 6 HOURS PRN
Status: DISCONTINUED | OUTPATIENT
Start: 2021-05-02 | End: 2021-05-03 | Stop reason: HOSPADM

## 2021-05-02 RX ORDER — HYDROXYZINE PAMOATE 25 MG/1
25 CAPSULE ORAL 3 TIMES DAILY PRN
Status: DISCONTINUED | OUTPATIENT
Start: 2021-05-02 | End: 2021-05-03 | Stop reason: HOSPADM

## 2021-05-02 RX ORDER — ACETAMINOPHEN 325 MG/1
650 TABLET ORAL EVERY 6 HOURS PRN
Status: DISCONTINUED | OUTPATIENT
Start: 2021-05-02 | End: 2021-05-03 | Stop reason: HOSPADM

## 2021-05-02 RX ORDER — PANTOPRAZOLE SODIUM 40 MG/1
40 TABLET, DELAYED RELEASE ORAL
Status: DISCONTINUED | OUTPATIENT
Start: 2021-05-02 | End: 2021-05-03 | Stop reason: HOSPADM

## 2021-05-02 RX ORDER — SODIUM CHLORIDE 0.9 % (FLUSH) 0.9 %
5-40 SYRINGE (ML) INJECTION EVERY 12 HOURS SCHEDULED
Status: DISCONTINUED | OUTPATIENT
Start: 2021-05-02 | End: 2021-05-03 | Stop reason: HOSPADM

## 2021-05-02 RX ORDER — FOLIC ACID 1 MG/1
1 TABLET ORAL DAILY
Status: DISCONTINUED | OUTPATIENT
Start: 2021-05-02 | End: 2021-05-03 | Stop reason: HOSPADM

## 2021-05-02 RX ORDER — ONDANSETRON 2 MG/ML
4 INJECTION INTRAMUSCULAR; INTRAVENOUS EVERY 6 HOURS PRN
Status: DISCONTINUED | OUTPATIENT
Start: 2021-05-02 | End: 2021-05-03 | Stop reason: HOSPADM

## 2021-05-02 RX ORDER — LABETALOL HYDROCHLORIDE 5 MG/ML
10 INJECTION, SOLUTION INTRAVENOUS EVERY 4 HOURS PRN
Status: DISCONTINUED | OUTPATIENT
Start: 2021-05-02 | End: 2021-05-03 | Stop reason: HOSPADM

## 2021-05-02 RX ORDER — CYCLOBENZAPRINE HCL 10 MG
10 TABLET ORAL NIGHTLY PRN
Status: DISCONTINUED | OUTPATIENT
Start: 2021-05-02 | End: 2021-05-03 | Stop reason: HOSPADM

## 2021-05-02 RX ORDER — FENTANYL CITRATE 50 UG/ML
25 INJECTION, SOLUTION INTRAMUSCULAR; INTRAVENOUS
Status: DISCONTINUED | OUTPATIENT
Start: 2021-05-02 | End: 2021-05-02

## 2021-05-02 RX ORDER — SODIUM CHLORIDE 9 MG/ML
25 INJECTION, SOLUTION INTRAVENOUS PRN
Status: DISCONTINUED | OUTPATIENT
Start: 2021-05-02 | End: 2021-05-03 | Stop reason: HOSPADM

## 2021-05-02 RX ORDER — MAGNESIUM SULFATE IN WATER 40 MG/ML
2000 INJECTION, SOLUTION INTRAVENOUS ONCE
Status: COMPLETED | OUTPATIENT
Start: 2021-05-02 | End: 2021-05-02

## 2021-05-02 RX ORDER — AMLODIPINE BESYLATE AND BENAZEPRIL HYDROCHLORIDE 5; 10 MG/1; MG/1
1 CAPSULE ORAL DAILY
Status: DISCONTINUED | OUTPATIENT
Start: 2021-05-02 | End: 2021-05-02 | Stop reason: CLARIF

## 2021-05-02 RX ORDER — SODIUM CHLORIDE 0.9 % (FLUSH) 0.9 %
5-40 SYRINGE (ML) INJECTION PRN
Status: DISCONTINUED | OUTPATIENT
Start: 2021-05-02 | End: 2021-05-03 | Stop reason: HOSPADM

## 2021-05-02 RX ORDER — NICOTINE 21 MG/24HR
1 PATCH, TRANSDERMAL 24 HOURS TRANSDERMAL DAILY
Status: DISCONTINUED | OUTPATIENT
Start: 2021-05-02 | End: 2021-05-03 | Stop reason: HOSPADM

## 2021-05-02 RX ORDER — POLYETHYLENE GLYCOL 3350 17 G/17G
17 POWDER, FOR SOLUTION ORAL DAILY PRN
Status: DISCONTINUED | OUTPATIENT
Start: 2021-05-02 | End: 2021-05-03 | Stop reason: HOSPADM

## 2021-05-02 RX ORDER — MULTIVITAMIN WITH IRON
1 TABLET ORAL DAILY
Status: DISCONTINUED | OUTPATIENT
Start: 2021-05-02 | End: 2021-05-03 | Stop reason: HOSPADM

## 2021-05-02 RX ORDER — ASPIRIN 81 MG/1
81 TABLET ORAL DAILY
Status: DISCONTINUED | OUTPATIENT
Start: 2021-05-02 | End: 2021-05-03 | Stop reason: HOSPADM

## 2021-05-02 RX ORDER — FLUOXETINE HYDROCHLORIDE 20 MG/1
20 CAPSULE ORAL DAILY
Status: DISCONTINUED | OUTPATIENT
Start: 2021-05-02 | End: 2021-05-03 | Stop reason: HOSPADM

## 2021-05-02 RX ORDER — HYDROXYCHLOROQUINE SULFATE 200 MG/1
400 TABLET, FILM COATED ORAL DAILY
Status: DISCONTINUED | OUTPATIENT
Start: 2021-05-02 | End: 2021-05-03 | Stop reason: HOSPADM

## 2021-05-02 RX ORDER — LISINOPRIL 10 MG/1
10 TABLET ORAL DAILY
Status: DISCONTINUED | OUTPATIENT
Start: 2021-05-02 | End: 2021-05-03 | Stop reason: HOSPADM

## 2021-05-02 RX ORDER — FENTANYL CITRATE 50 UG/ML
50 INJECTION, SOLUTION INTRAMUSCULAR; INTRAVENOUS
Status: DISCONTINUED | OUTPATIENT
Start: 2021-05-02 | End: 2021-05-02

## 2021-05-02 RX ORDER — MORPHINE SULFATE 4 MG/ML
4 INJECTION, SOLUTION INTRAMUSCULAR; INTRAVENOUS ONCE
Status: COMPLETED | OUTPATIENT
Start: 2021-05-02 | End: 2021-05-02

## 2021-05-02 RX ORDER — MORPHINE SULFATE 2 MG/ML
2 INJECTION, SOLUTION INTRAMUSCULAR; INTRAVENOUS
Status: DISCONTINUED | OUTPATIENT
Start: 2021-05-02 | End: 2021-05-02

## 2021-05-02 RX ORDER — BUSPIRONE HYDROCHLORIDE 5 MG/1
7.5 TABLET ORAL 2 TIMES DAILY
Status: DISCONTINUED | OUTPATIENT
Start: 2021-05-02 | End: 2021-05-03 | Stop reason: HOSPADM

## 2021-05-02 RX ORDER — MORPHINE SULFATE 4 MG/ML
4 INJECTION, SOLUTION INTRAMUSCULAR; INTRAVENOUS
Status: DISCONTINUED | OUTPATIENT
Start: 2021-05-02 | End: 2021-05-02

## 2021-05-02 RX ORDER — ACETAMINOPHEN 650 MG/1
650 SUPPOSITORY RECTAL EVERY 6 HOURS PRN
Status: DISCONTINUED | OUTPATIENT
Start: 2021-05-02 | End: 2021-05-03 | Stop reason: HOSPADM

## 2021-05-02 RX ORDER — AMLODIPINE BESYLATE 5 MG/1
5 TABLET ORAL DAILY
Status: DISCONTINUED | OUTPATIENT
Start: 2021-05-02 | End: 2021-05-03 | Stop reason: HOSPADM

## 2021-05-02 RX ORDER — PREDNISONE 1 MG/1
1 TABLET ORAL DAILY
Status: DISCONTINUED | OUTPATIENT
Start: 2021-05-02 | End: 2021-05-02

## 2021-05-02 RX ORDER — FENTANYL CITRATE 50 UG/ML
50 INJECTION, SOLUTION INTRAMUSCULAR; INTRAVENOUS ONCE
Status: COMPLETED | OUTPATIENT
Start: 2021-05-02 | End: 2021-05-02

## 2021-05-02 RX ORDER — LANOLIN ALCOHOL/MO/W.PET/CERES
100 CREAM (GRAM) TOPICAL DAILY
Status: DISCONTINUED | OUTPATIENT
Start: 2021-05-02 | End: 2021-05-03 | Stop reason: HOSPADM

## 2021-05-02 RX ADMIN — ASPIRIN 81 MG: 81 TABLET, FILM COATED ORAL at 07:58

## 2021-05-02 RX ADMIN — ONDANSETRON 4 MG: 2 INJECTION INTRAMUSCULAR; INTRAVENOUS at 07:58

## 2021-05-02 RX ADMIN — PANTOPRAZOLE SODIUM 40 MG: 40 TABLET, DELAYED RELEASE ORAL at 05:59

## 2021-05-02 RX ADMIN — BUSPIRONE HYDROCHLORIDE 7.5 MG: 5 TABLET ORAL at 07:59

## 2021-05-02 RX ADMIN — FOLIC ACID 1 MG: 1 TABLET ORAL at 09:44

## 2021-05-02 RX ADMIN — Medication 100 MG: at 09:44

## 2021-05-02 RX ADMIN — ONDANSETRON 4 MG: 2 INJECTION INTRAMUSCULAR; INTRAVENOUS at 16:13

## 2021-05-02 RX ADMIN — BUSPIRONE HYDROCHLORIDE 7.5 MG: 5 TABLET ORAL at 21:22

## 2021-05-02 RX ADMIN — HYDROMORPHONE HYDROCHLORIDE 1 MG: 1 INJECTION, SOLUTION INTRAMUSCULAR; INTRAVENOUS; SUBCUTANEOUS at 19:16

## 2021-05-02 RX ADMIN — FENTANYL CITRATE 50 MCG: 50 INJECTION, SOLUTION INTRAMUSCULAR; INTRAVENOUS at 07:58

## 2021-05-02 RX ADMIN — HYDROMORPHONE HYDROCHLORIDE 1 MG: 1 INJECTION, SOLUTION INTRAMUSCULAR; INTRAVENOUS; SUBCUTANEOUS at 16:10

## 2021-05-02 RX ADMIN — SODIUM CHLORIDE, PRESERVATIVE FREE 10 ML: 5 INJECTION INTRAVENOUS at 07:59

## 2021-05-02 RX ADMIN — FENTANYL CITRATE 50 MCG: 50 INJECTION, SOLUTION INTRAMUSCULAR; INTRAVENOUS at 00:16

## 2021-05-02 RX ADMIN — SODIUM CHLORIDE: 9 INJECTION, SOLUTION INTRAVENOUS at 16:13

## 2021-05-02 RX ADMIN — MORPHINE SULFATE 4 MG: 4 INJECTION, SOLUTION INTRAMUSCULAR; INTRAVENOUS at 03:54

## 2021-05-02 RX ADMIN — HYDROMORPHONE HYDROCHLORIDE 1 MG: 1 INJECTION, SOLUTION INTRAMUSCULAR; INTRAVENOUS; SUBCUTANEOUS at 02:47

## 2021-05-02 RX ADMIN — AMLODIPINE BESYLATE 5 MG: 5 TABLET ORAL at 07:59

## 2021-05-02 RX ADMIN — LISINOPRIL 10 MG: 10 TABLET ORAL at 07:58

## 2021-05-02 RX ADMIN — HYDROMORPHONE HYDROCHLORIDE 1 MG: 1 INJECTION, SOLUTION INTRAMUSCULAR; INTRAVENOUS; SUBCUTANEOUS at 09:43

## 2021-05-02 RX ADMIN — SODIUM CHLORIDE 200 ML/HR: 9 INJECTION, SOLUTION INTRAVENOUS at 23:48

## 2021-05-02 RX ADMIN — HYDROXYZINE PAMOATE 25 MG: 25 CAPSULE ORAL at 09:44

## 2021-05-02 RX ADMIN — SODIUM CHLORIDE: 9 INJECTION, SOLUTION INTRAVENOUS at 04:55

## 2021-05-02 RX ADMIN — MORPHINE SULFATE 4 MG: 4 INJECTION, SOLUTION INTRAMUSCULAR; INTRAVENOUS at 05:54

## 2021-05-02 RX ADMIN — ACETAMINOPHEN 650 MG: 325 TABLET ORAL at 18:29

## 2021-05-02 RX ADMIN — MORPHINE SULFATE 4 MG: 4 INJECTION, SOLUTION INTRAMUSCULAR; INTRAVENOUS at 04:52

## 2021-05-02 RX ADMIN — CYCLOBENZAPRINE 10 MG: 10 TABLET, FILM COATED ORAL at 23:44

## 2021-05-02 RX ADMIN — IOPAMIDOL 75 ML: 755 INJECTION, SOLUTION INTRAVENOUS at 00:36

## 2021-05-02 RX ADMIN — HYDROMORPHONE HYDROCHLORIDE 1 MG: 1 INJECTION, SOLUTION INTRAMUSCULAR; INTRAVENOUS; SUBCUTANEOUS at 12:57

## 2021-05-02 RX ADMIN — FLUOXETINE 40 MG: 20 CAPSULE ORAL at 21:27

## 2021-05-02 RX ADMIN — MAGNESIUM SULFATE HEPTAHYDRATE 2000 MG: 40 INJECTION, SOLUTION INTRAVENOUS at 07:59

## 2021-05-02 RX ADMIN — THERA TABS 1 TABLET: TAB at 09:44

## 2021-05-02 RX ADMIN — HYDROXYCHLOROQUINE SULFATE 400 MG: 200 TABLET ORAL at 07:59

## 2021-05-02 RX ADMIN — FLUOXETINE 20 MG: 20 CAPSULE ORAL at 07:59

## 2021-05-02 ASSESSMENT — ENCOUNTER SYMPTOMS
VOMITING: 0
NAUSEA: 1
WHEEZING: 0
EYE PAIN: 0
SHORTNESS OF BREATH: 0
CONSTIPATION: 0
SORE THROAT: 0
RHINORRHEA: 0
COLOR CHANGE: 0
ABDOMINAL PAIN: 1
BLOOD IN STOOL: 0
DIARRHEA: 0
COUGH: 0

## 2021-05-02 ASSESSMENT — PAIN SCALES - GENERAL
PAINLEVEL_OUTOF10: 9
PAINLEVEL_OUTOF10: 10
PAINLEVEL_OUTOF10: 7
PAINLEVEL_OUTOF10: 10
PAINLEVEL_OUTOF10: 10
PAINLEVEL_OUTOF10: 9
PAINLEVEL_OUTOF10: 7
PAINLEVEL_OUTOF10: 7

## 2021-05-02 ASSESSMENT — PAIN DESCRIPTION - PROGRESSION: CLINICAL_PROGRESSION: GRADUALLY WORSENING

## 2021-05-02 ASSESSMENT — PAIN DESCRIPTION - ORIENTATION: ORIENTATION: UPPER

## 2021-05-02 ASSESSMENT — PAIN DESCRIPTION - ONSET: ONSET: ON-GOING

## 2021-05-02 ASSESSMENT — PAIN DESCRIPTION - FREQUENCY: FREQUENCY: CONTINUOUS

## 2021-05-02 ASSESSMENT — PAIN DESCRIPTION - LOCATION: LOCATION: ABDOMEN

## 2021-05-02 NOTE — ED PROVIDER NOTES
[facility]  eMERGENCY dEPARTMENT eNCOUnter      Pt Name: Mikael Hinkle  MRN: [id number]  [place] 1970  Date of evaluation: 5/1/2021  Provider: Samantha Eddy MD     CHIEF COMPLAINT       Chief Complaint   Patient presents with    Pancreatitis     had blood work done by PCP and was dx and told not to eat, tried food today         HISTORY OF PRESENT ILLNESS   (Location/Symptom, Timing/Onset,Context/Setting, Quality, Duration, Modifying Factors, Severity) Note limiting factors. Patient presents here with chief complaint of abdominal pain. Patient has a history of pancreatitis first episode being in October. She states that they told her that it was from alcohol but she did not feel she was a heavy drinker. She also has an autoimmune disease for which she has been on prednisone and at that time was on methotrexate. She is currently weaning off the prednisone. Pain started yesterday. She thought might have been from something she ate unusual but that was earlier in the week. She did have 1 beer the day before this started. See her PCP who rhonda blood work and advised her to let her pancreas rest and go on clear liquids but the pain has gotten worse. She denies any fevers or chills. She is nauseated. She denies chest pain or shortness of breath. Mikael Hinkle is a 53 y.o. female who presents to the emergency department      Nursing Notes were reviewed. REVIEW OF SYSTEMS    (2+ for4; 10+ for level 5)     Review of Systems   Constitutional: Negative for appetite change, chills, fatigue, fever and unexpected weight change. HENT: Negative for congestion, drooling, nosebleeds, rhinorrhea and trouble swallowing. Eyes: Negative for pain and visual disturbance. Respiratory: Negative for cough, chest tightness, shortness of breath and wheezing. Cardiovascular: Negative for chest pain, palpitations and leg swelling.    Gastrointestinal: Positive for abdominal pain and nausea. Negative for blood in stool, diarrhea and vomiting. Endocrine: Negative for polydipsia and polyuria. Genitourinary: Negative for difficulty urinating, dysuria, flank pain, frequency, hematuria and urgency. Musculoskeletal: Negative for arthralgias, back pain, myalgias and neck pain. Skin: Negative for pallor and rash. Allergic/Immunologic: Negative for environmental allergies. Neurological: Negative for dizziness, syncope, speech difficulty, weakness, light-headedness, numbness and headaches. Hematological: Does not bruise/bleed easily. Psychiatric/Behavioral: Negative for confusion and decreased concentration. All other systems reviewed and are negative.       PAST MEDICAL HISTORY     Past Medical History:   Diagnosis Date    Anxiety     Depression     Herniated disc     Hx of Raynaud's syndrome 1/25/2021    Hypertension     Peripheral neuropathy 1/25/2021    Pneumonia     Raynaud's disease     TMJ (dislocation of temporomandibular joint)        SURGICALHISTORY       Past Surgical History:   Procedure Laterality Date    HYSTERECTOMY      OVARIAN CYST REMOVAL      benign mass removal    SKIN BIOPSY Left     left hand       CURRENT MEDICATIONS       Previous Medications    AMLODIPINE-BENAZEPRIL (LOTREL) 5-10 MG PER CAPSULE    take 1 capsule by mouth once daily    ASPIRIN 81 MG EC TABLET    Take 81 mg by mouth daily    B COMPLEX VITAMINS (VITAMIN B COMPLEX PO)    Take 1 tablet by mouth every morning     BUSPIRONE (BUSPAR) 7.5 MG TABLET    take 1 tablet by mouth twice a day    CALCIUM-VITAMIN D PO    Take 1 tablet by mouth every morning     CYCLOBENZAPRINE (FLEXERIL) 10 MG TABLET    Take 1 tablet by mouth nightly as needed for Muscle spasms    FLAXSEED, LINSEED, (BL FLAX SEED OIL PO)    Take by mouth daily    FLUOXETINE (PROZAC) 20 MG CAPSULE    take 1 capsule by mouth every morning 2 capsules every evening    HYDROCORTISONE 2.5 % CREAM    Apply twice a day to active rash on face. HYDROCORTISONE 2.5 % OINTMENT    apply to affected area ON face twice a day (DO NOT PUT INSIDE EYES)    HYDROXYCHLOROQUINE (PLAQUENIL) 200 MG TABLET    Take 400 mg by mouth daily     HYPROMELLOSE (ARTIFICIAL TEARS) 0.4 % SOLN OPHTHALMIC SOLUTION    Place 1 drop into both eyes every 4 hours as needed (dry eyes)    LIFITEGRAST (XIIDRA) 5 % SOLN    Apply to eye 2 times daily     MULTIPLE VITAMINS-MINERALS (CENTRA-JACOBY PO)    Take by mouth daily    MULTIPLE VITAMINS-MINERALS (HAIR/SKIN/NAILS PO)    Take 1 tablet by mouth every morning     OMEGA-3 FATTY ACIDS (FISH OIL) 1000 MG CAPS    Take 3,000 mg by mouth daily     OMEPRAZOLE (PRILOSEC) 20 MG DELAYED RELEASE CAPSULE    Take 1 capsule by mouth Daily    PREDNISONE (DELTASONE) 1 MG TABLET    1 mg daily     TRIAMCINOLONE (KENALOG) 0.1 % CREAM    Apply twice a day to active rash on hands and elbow.     VITAMIN D (CHOLECALCIFEROL) 1000 UNIT TABS TABLET    Take 1,000 Units by mouth every morning             Penicillins, Bactrim [sulfamethoxazole-trimethoprim], Levaquin [levofloxacin], and Macrobid [nitrofurantoin]    FAMILY HISTORY       Family History   Problem Relation Age of Onset    Heart Disease Father         CHF          SOCIAL HISTORY       Social History     Socioeconomic History    Marital status:      Spouse name: None    Number of children: None    Years of education: None    Highest education level: None   Occupational History    None   Social Needs    Financial resource strain: Not hard at all   [place] insecurity     Worry: Never true     Inability: Never true    Transportation needs     Medical: No     Non-medical: No   Tobacco Use    Smoking status: Current Every Day Smoker     Packs/day: 0.25     Years: 30.00     Pack years: 7.50     Types: Cigarettes    Smokeless tobacco: Never Used   Substance and Sexual Activity    Alcohol use: Yes     Frequency: 2-3 times a week     Drinks per session: 3 or 4 Comment: occasional    Drug use: No    Sexual activity: None   Lifestyle    Physical activity     Days per week: None     Minutes per session: None    Stress: None   Relationships    Social connections     Talks on phone: None     Gets together: None     Attends Jain service: None     Active member of club or organization: None     Attends meetings of clubs or organizations: None     Relationship status: None    Intimate partner violence     Fear of current or ex partner: None     Emotionally abused: None     Physically abused: None     Forced sexual activity: None   Other Topics Concern    None   Social History Narrative    None       SCREENINGS      @FLOW([id number])@    PHYSICAL EXAM    (5+ for level 4, 8+ for level 5)     ED Triage Vitals [05/01/21 2139]   BP Temp Temp src Pulse Resp SpO2 Height Weight   (!) 181/95 97.9 °F (36.6 °C) -- 103 18 99 % 5' 5\" (1.651 m) 156 lb (70.8 kg)       Physical Exam  Vitals signs and nursing note reviewed. Constitutional:       General: She is in acute distress. Appearance: She is well-developed and normal weight. She is not diaphoretic. HENT:      Head: Normocephalic and atraumatic. Nose: Nose normal.      Mouth/Throat:      Pharynx: No oropharyngeal exudate. Eyes:      General: No scleral icterus. Right eye: No discharge. Left eye: No discharge. Conjunctiva/sclera: Conjunctivae normal.      Pupils: Pupils are equal, round, and reactive to light. Neck:      Musculoskeletal: Normal range of motion and neck supple. Thyroid: No thyromegaly. Vascular: No JVD. Trachea: No tracheal deviation. Cardiovascular:      Rate and Rhythm: Normal rate and regular rhythm. Heart sounds: Normal heart sounds. No murmur. No gallop. Pulmonary:      Effort: Pulmonary effort is normal. No respiratory distress. Breath sounds: Normal breath sounds. No stridor. No wheezing or rales. Chest:      Chest wall: No tenderness. Abdominal:      General: There is no distension. Palpations: Abdomen is soft. There is no mass. Tenderness: There is abdominal tenderness. There is no guarding or rebound. Comments: Tenderness across mid abdomen   Musculoskeletal: Normal range of motion. General: No tenderness or deformity. Lymphadenopathy:      Cervical: No cervical adenopathy. Skin:     General: Skin is warm and dry. Coloration: Skin is not pale. Findings: No erythema or rash. Neurological:      General: No focal deficit present. Mental Status: She is alert and oriented to person, place, and time. Cranial Nerves: No cranial nerve deficit. Motor: No abnormal muscle tone. Coordination: Coordination normal.   Psychiatric:         Mood and Affect: Mood normal.         Behavior: Behavior normal.         Thought Content: Thought content normal.         Judgment: Judgment normal.         DIAGNOSTIC RESULTS     EKG (Per Emergency Physician):       RADIOLOGY (Per Rosette Fontana): Interpretation per the Radiologist below, if available at the time of this note:  Ct Abdomen Pelvis W Iv Contrast Additional Contrast? None    Result Date: 5/2/2021  EXAMINATION: CT OF THE ABDOMEN AND PELVIS WITH CONTRAST 5/1/2021 11:36 pm TECHNIQUE: CT of the abdomen and pelvis was performed with the administration of intravenous contrast. Multiplanar reformatted images are provided for review. Dose modulation, iterative reconstruction, and/or weight based adjustment of the mA/kV was utilized to reduce the radiation dose to as low as reasonably achievable.  COMPARISON: November 1, 2020 HISTORY: ORDERING SYSTEM PROVIDED HISTORY: pain  hx pancreatitis TECHNOLOGIST PROVIDED HISTORY: Additional Contrast?->None Reason for exam:->pain  hx pancreatitis Decision Support Exception - unselect if not a suspected or confirmed emergency medical condition->Emergency Medical Condition (MA) FINDINGS: Lower Chest: The lung bases are clear. Organs: The liver, spleen, adrenals, gallbladder and kidneys are within normal limits. There are inflammatory changes adjacent to and caudal to the head of the pancreas and uncinate process, consistent with acute pancreatitis. There are no organized peripancreatic fluid collections. Pancreatic duct is not dilated. GI/Bowel: No evidence of bowel obstruction or free intraperitoneal air. No colitis or diverticulitis. No signs of appendicitis. Pelvis: Status post hysterectomy. Urinary bladder within normal limits. No free fluid in the pelvis. Peritoneum/Retroperitoneum: No abdominal aortic aneurysm. Nonspecific retroperitoneal nodes. Bones/Soft Tissues: No acute bony pathology. Inflammatory changes adjacent to and caudal to the head of the pancreas and uncinate process consistent with acute pancreatitis. No organized peripancreatic fluid collections.       :  Labs Reviewed   CBC WITH AUTO DIFFERENTIAL - Abnormal; Notable for the following components:       Result Value    WBC 15.3 (*)     MCHC 34.7 (*)     Neutrophils % 80.4 (*)     Lymphocytes % 7.9 (*)     Neutrophils Absolute 12.28 (*)     Lymphocytes Absolute 1.21 (*)     Monocytes Absolute 1.42 (*)     All other components within normal limits   COMPREHENSIVE METABOLIC PANEL W/ REFLEX TO MG FOR LOW K - Abnormal; Notable for the following components:    Sodium 127 (*)     Chloride 94 (*)     Glucose 127 (*)     All other components within normal limits   LIPASE - Abnormal; Notable for the following components:    Lipase 87 (*)     All other components within normal limits   URINALYSIS - Abnormal; Notable for the following components:    Leukocyte Esterase, Urine TRACE (*)     All other components within normal limits   MICROSCOPIC URINALYSIS       All other labs were within normal range or not returned as of this dictation.     EMERGENCY DEPARTMENT COURSE and DIFFERENTIALDIAGNOSIS/MDM:   Vitals:    Vitals:    05/01/21 2139 05/01/21 2250 05/02/21 0017   BP: (!) 181/95 (!) 146/87 (!) 151/82   Pulse: 103 91    Resp: 18     Temp: 97.9 °F (36.6 °C)     SpO2: 99% 99%    Weight: 156 lb (70.8 kg)     Height: 5' 5\" (1.651 m)         Medications   morphine sulfate (PF) injection 4 mg (4 mg Intravenous Given 5/1/21 2310)   HYDROmorphone (DILAUDID) injection 1 mg (has no administration in time range)   0.9 % sodium chloride bolus (0 mLs Intravenous Stopped 5/1/21 2253)   morphine sulfate (PF) injection 4 mg (4 mg Intravenous Given 5/1/21 2204)   ondansetron (ZOFRAN) injection 4 mg (4 mg Intravenous Given 5/1/21 2204)   iopamidol (ISOVUE-370) 76 % injection 75 mL (75 mLs Intravenous Given 5/2/21 0036)   fentaNYL (SUBLIMAZE) injection 50 mcg (50 mcg Intravenous Given 5/2/21 0016)       MDM  Number of Diagnoses or Management Options  Diagnosis management comments: Patient presents here with abdominal pain. She has a history of pancreatitis. She did have 1 beer a couple of days ago but she does not think that started it. She is having some tenderness. She is obviously very uncomfortable and pacing around the room. Labs were as noted that her lipase actually had improved and was in the 80s. Her white count was slightly elevated. She received multiple doses of pain medication and still uncomfortable and her CT did reveal evidence of acute pancreatitis. At this time I spoke with the family practice resident who will be coming to evaluate the patient mission. .      CONSULTS:  IP CONSULT TO FAMILY MEDICINE    PROCEDURES:  Unless otherwise noted below, none     Procedures    FINAL IMPRESSION      1. Acute pancreatitis, unspecified complication status, unspecified pancreatitis type Worsening       DISPOSITION/PLAN   DISPOSITION Decision To Admit 05/02/2021 02:19:21 AM      PATIENT REFERRED TO:  No follow-up provider specified.     DISCHARGE MEDICATIONS:  New Prescriptions    No medications on file          (Please note:  Portions of this note were completed with a voice recognition program.  Efforts were made to edit thedictations but occasionally words and phrases are mis-transcribed.)    Form v2016. J.5-cn    Alejandro Moise MD (electronically signed)  Emergency Medicine Provider         Alejandro Moise MD  05/02/21 [id number]

## 2021-05-02 NOTE — H&P
[address]  History and Physical      CHIEF COMPLAINT: abdominal pain    History of Present Illness:   Ms. Ene Porras is a 53year-old female patient of Dr. Samantha Miller with a PMHx of tobacco use, alcohol-induced pancreatitis, anxiety, depression, dermatomyositis, and HTN who presents to [facility] ED this morning for abdominal pain. Patient states that her abdominal pain started Friday morning. Patient was seen by PCP in the office on Friday morning. Labs were ordered and revealed a lipase=103. Patient was told that she had pancreatitis. Since she did not have severe pain or nausea and was tolerating liquids, she was instructed to continue clear broths at home and follow-up in the office on Monday. However, patient states that Saturday evening, her abdominal pain worsened after eating bread with her broth. She reports that she developed accompanying nausea. Due to worsening symptoms, she sought the ED. Patient states that her abdominal pain is located in the middle of her abdomen. She describes the pain as an ache. It has been constant. Nothing makes it better or worse. Her pain is a 9/10 on the pain scale. Patient states that she had 5 beers on Tuesday evening and 1 beer on Thursday evening, which was her last alcoholic drink. She states that she normally drinks a 12-pack of beer weekly. She has cut down from a 24-pack of beer weekly after being hospitalized for alcohol-induced pancreatitis in November 2020. Patient also smokes 0.5 pack of cigarettes per day. ROS is positive for chills, abdominal pain, and nausea. She denies fever, chest pain, palpitations, shortness of breath, diarrhea, constipation, blood in stool, and urinary symptoms. In the ED, vitals upon arrival were as follows: T 97.9 F, RR 18, , /95, and SpO2 99% on room air. Her BP improved to 151/82. EKG was reviewed and showed concerns for lead reversal. Patient had QTc WNL.  Labs were notable for lipase= 87, WBC= 15.3, and Na+= 127. CT abdomen showed evidence of acute pancreatitis without necrosis or pseudocyst. She was given 1 L 0.9% NaCl bolus, IV Zofran 4 mg x 1, Fentanyl 50 mcg x 1, Dilaudid 1 mg x 1, and morphine 4 mg x 2 while in the ED. Patient was admitted to the intermediate floor for alcohol-induced acute pancreatitis. Past Medical History:   Diagnosis Date    Anxiety     Depression     Herniated disc     Hx of Raynaud's syndrome 1/25/2021    Hypertension     Peripheral neuropathy 1/25/2021    Pneumonia     Raynaud's disease     TMJ (dislocation of temporomandibular joint)          Past Surgical History:   Procedure Laterality Date    HYSTERECTOMY      OVARIAN CYST REMOVAL      benign mass removal    SKIN BIOPSY Left     left hand       Medications Prior to Admission:    Not in a hospital admission. Allergies:    Penicillins, Bactrim [sulfamethoxazole-trimethoprim], Levaquin [levofloxacin], and Macrobid [nitrofurantoin]    Social History:    reports that she has been smoking cigarettes. She has a 7.50 pack-year smoking history. She has never used smokeless tobacco. She reports current alcohol use. She reports that she does not use drugs. Family History:   family history includes Heart Disease in her father. REVIEW OF SYSTEMS:  Review of Systems   Constitutional: Positive for chills. Negative for fever. HENT: Negative for congestion, ear pain, rhinorrhea and sore throat. Eyes: Negative for pain and visual disturbance. Respiratory: Negative for cough, shortness of breath and wheezing. Cardiovascular: Negative for chest pain and palpitations. Gastrointestinal: Positive for abdominal pain and nausea. Negative for blood in stool, constipation, diarrhea and vomiting. Genitourinary: Negative for dysuria, frequency, hematuria and urgency. Skin: Negative for color change and rash. Neurological: Negative for dizziness, syncope and light-headedness. Psychiatric/Behavioral: Positive for dysphoric mood. The patient is nervous/anxious. PHYSICAL EXAM:  Vitals:  BP (!) 151/82   Pulse 91   Temp 97.9 °F (36.6 °C)   Resp 18   Ht 5' 5\" (1.651 m)   Wt 156 lb (70.8 kg)   SpO2 99%   BMI 25.96 kg/m²     Physical Exam  Vitals signs reviewed. Constitutional:       General: She is awake. She is in acute distress. Appearance: She is well-developed and well-groomed. She is ill-appearing. She is not toxic-appearing or diaphoretic. HENT:      Head: Normocephalic and atraumatic. Mouth/Throat:      Lips: Pink. Mouth: Mucous membranes are dry. Pharynx: Uvula midline. No oropharyngeal exudate or posterior oropharyngeal erythema. Eyes:      General: No scleral icterus. Conjunctiva/sclera:      Right eye: Right conjunctiva is not injected. Left eye: Left conjunctiva is not injected. Cardiovascular:      Rate and Rhythm: Normal rate and regular rhythm. Heart sounds: S1 normal and S2 normal. No murmur. Pulmonary:      Breath sounds: No decreased breath sounds, wheezing, rhonchi or rales. Abdominal:      General: Bowel sounds are normal. There is no distension. Palpations: Abdomen is soft. Tenderness: There is abdominal tenderness in the epigastric area. There is no guarding or rebound. Musculoskeletal:      Right lower leg: She exhibits no deformity. No edema. Left lower leg: She exhibits no deformity. No edema. Skin:     General: Skin is warm and dry. Coloration: Skin is not jaundiced. Findings: No bruising or rash. Neurological:      General: No focal deficit present. Mental Status: She is alert. Psychiatric:         Attention and Perception: Attention normal.         Mood and Affect: Mood normal.         Speech: Speech normal.         Behavior: Behavior is cooperative. Thought Content:  Thought content normal.         Cognition and Memory: Cognition normal.         Judgment: Judgment normal.       LABS:  CBC with Differential:    Lab Results   Component Value Date    WBC 15.3 05/01/2021    RBC 3.95 05/01/2021    HGB 12.7 05/01/2021    HCT 36.6 05/01/2021     05/01/2021    MCV 92.7 05/01/2021    MCH 32.2 05/01/2021    MCHC 34.7 05/01/2021    RDW 12.0 05/01/2021    BANDSPCT 4 12/27/2015    METASPCT 0.9 03/11/2019    LYMPHOPCT 7.9 05/01/2021    MONOPCT 9.3 05/01/2021    BASOPCT 0.5 05/01/2021    MONOSABS 1.42 05/01/2021    LYMPHSABS 1.21 05/01/2021    EOSABS 0.23 05/01/2021    BASOSABS 0.07 05/01/2021     CMP:    Lab Results   Component Value Date     05/01/2021    K 3.9 05/01/2021    CL 94 05/01/2021    CO2 22 05/01/2021    BUN 7 05/01/2021    CREATININE 0.5 05/01/2021    GFRAA >60 05/01/2021    LABGLOM >60 05/01/2021    GLUCOSE 127 05/01/2021    PROT 6.9 05/01/2021    LABALBU 4.0 05/01/2021    CALCIUM 8.8 05/01/2021    BILITOT 0.3 05/01/2021    ALKPHOS 91 05/01/2021    AST 29 05/01/2021    ALT 18 05/01/2021     U/A:    Lab Results   Component Value Date    NITRITE negative 03/11/2019    COLORU Yellow 05/01/2021    PROTEINU Negative 05/01/2021    PHUR 6.0 05/01/2021    WBCUA 0-1 05/01/2021    RBCUA NONE 05/01/2021    BACTERIA NONE SEEN 05/01/2021    CLARITYU Clear 05/01/2021    SPECGRAV 1.015 05/01/2021    LEUKOCYTESUR TRACE 05/01/2021    UROBILINOGEN 0.2 05/01/2021    BILIRUBINUR Negative 05/01/2021    BILIRUBINUR negative 03/11/2019    BLOODU TRACE-INTACT 05/01/2021    GLUCOSEU Negative 05/01/2021     LIPASE:    Lab Results   Component Value Date    LIPASE 87 05/01/2021     IMAGING:   CT ABDOMEN PELVIS W IV CONTRAST Additional Contrast? None   Final Result   Inflammatory changes adjacent to and caudal to the head of the pancreas and   uncinate process consistent with acute pancreatitis. No organized   peripancreatic fluid collections.            ASSESSMENT:    Active Hospital Problems    Diagnosis Date Noted    Alcohol-induced acute pancreatitis without infection or necrosis [K85.20]      Priority: High    HTN (hypertension) [I10] 04/27/2015     Priority: Medium    Depression [F32.9] 12/28/2015     Priority: Low    Anxiety [F41.9] 04/27/2015     Priority: Low    Tobacco use [Z72.0] 05/02/2021    Dermatomyositis ([facility] [place] [address] [M33.90] 05/02/2021    Hyponatremia [E87.1] 11/01/2020       PLAN:  Alcohol-induced acute pancreatitis without infection or necrosis  - Telemetry   - Start 0.9% NaCl at 200 cc/hr   - Labs: CBC, CMP, lactate dehydrogenase, lipid panel, and magnesium    - NPO (sips with medications)   - I/Os   - Nausea control: IV Zofran 4 mg q 6 hrs PRN or PO Phenergan 12.5 mg q 6 hrs PRN   - Pain control: IV morphine 2 mg q 2 hrs PRN (moderate) or IV morphine 4 mg q 2 hrs PRN (severe)    Hyponatremia   Likely secondary to decreased PO intake   - On 0.9% NaCl at 200 cc/hr for acute pancreatitis, so will have to monitor Na+ level closely   - Labs: CMP, serum osmolality   - Urine studies: urine electrolytes, urine osmolality, urine creatinine       HTN   - Vitals q 4 hours   - Continue home amlodipine-benazepril 5-10 mg QD   - IV labetalol 10 mg q 4 hrs PRN with parameters: SBP> 160 or DBP> 100     Dermatomyositis  - Continue home Plaquenil 400 mg QD   - Continue home prednisone 1 mg QD     Depression and anxiety   - Continue home Buspar 7.5 mg BID   - Continue home Prozac: 20 mg AM and 40 mg PM     Tobacco use   - Nicotine patch 14 mg/24 hours    Continue additional home medications:   - Flexeril 10 mg HS PRN for muscle spasms   - Protonix 40 mg QD substituted for home omeprazole 20 mg QD     Diet: NPO (sips with medications)  DVT prophylaxis: Lovenox 40 mg QD   Code: Full     Discussed with on-call Family Medicine attending physician, Dr. Pamela Phillips.      Rasheeda Howard DO, PGY-3   3:31 AM  5/2/2021

## 2021-05-02 NOTE — PROGRESS NOTES
Patient seen and examined by rounding team. Patient is still in pain and is anxious. Only changes to the plan are addition of Dilaudid PRN for pain control and Vistaril PRN for anxiety    MD Anshul Robledo Attending    S: 53 y.o. female with continued pain and increased anxiety. O: VS- Blood pressure (!) 143/93, pulse 111, temperature 97.8 °F (36.6 °C), temperature source Oral, resp. rate 18, height 5' 5\" (1.651 m), weight 156 lb (70.8 kg), SpO2 98 %, not currently breastfeeding. Exam is as noted by resident on H and P.  RRR, positive murmur  CTA B no crackles or wheezes  + abd pain most in left upper and epigastric region. No rebound. No LE edema B      Impressions:   Principal Problem:    Alcohol-induced acute pancreatitis without infection or necrosis  Active Problems:    Anxiety    HTN (hypertension)    Depression    Hyponatremia    Tobacco use    Dermatomyositis ([facility] [address]  Resolved Problems:    * No resolved hospital problems. *      Plan:     1.pancreatitis without infection or necrosis - pt's last drink was on Thursday. CWA protocol not in place. Pt ed on AA. Will follow lipase levels. NPO as pt still in significant pain. Have changed to diluadid for pain control. Vistaril for anxiety. Low ransen score. Steroids stopped as they have remote increassed risk of causing pancreatitis - pt was on 1mg daily. Fluids on board. 2. Dermatomyositis - pt on 1mg pred daily. Was being tapered off this. Will stop here. Cont on home plaquenil. 3. Depression and anxeity - on on buspar and prozac. Have added vistaril. 4. tob use - nicotine patch started. 5. Chronic issues - per resident h and p. Attending Physician Statement  I have reviewed the chart, including any radiology or labs, and seen the patient with the resident(s).   I personally reviewed and performed key elements of the history and exam.  I agree with the assessment, plan and orders as

## 2021-05-03 VITALS
HEART RATE: 101 BPM | HEIGHT: 65 IN | WEIGHT: 156 LBS | DIASTOLIC BLOOD PRESSURE: 71 MMHG | SYSTOLIC BLOOD PRESSURE: 130 MMHG | OXYGEN SATURATION: 95 % | RESPIRATION RATE: 16 BRPM | BODY MASS INDEX: 25.99 KG/M2 | TEMPERATURE: 97 F

## 2021-05-03 PROBLEM — E87.1 HYPONATREMIA: Status: RESOLVED | Noted: 2020-11-01 | Resolved: 2021-05-03

## 2021-05-03 LAB
ALBUMIN SERPL-MCNC: 4 G/DL (ref 3.5–5.2)
ALP BLD-CCNC: 101 U/L (ref 35–104)
ALT SERPL-CCNC: 15 U/L (ref 0–32)
ANION GAP SERPL CALCULATED.3IONS-SCNC: 12 MMOL/L (ref 7–16)
AST SERPL-CCNC: 21 U/L (ref 0–31)
BASOPHILS ABSOLUTE: 0.03 E9/L (ref 0–0.2)
BASOPHILS RELATIVE PERCENT: 0.2 % (ref 0–2)
BILIRUB SERPL-MCNC: 0.4 MG/DL (ref 0–1.2)
BUN BLDV-MCNC: 5 MG/DL (ref 6–20)
CALCIUM SERPL-MCNC: 9.1 MG/DL (ref 8.6–10.2)
CHLORIDE BLD-SCNC: 100 MMOL/L (ref 98–107)
CO2: 23 MMOL/L (ref 22–29)
CREAT SERPL-MCNC: 0.5 MG/DL (ref 0.5–1)
EOSINOPHILS ABSOLUTE: 0.12 E9/L (ref 0.05–0.5)
EOSINOPHILS RELATIVE PERCENT: 0.9 % (ref 0–6)
GFR AFRICAN AMERICAN: >60
GFR NON-AFRICAN AMERICAN: >60 ML/MIN/1.73
GLUCOSE BLD-MCNC: 82 MG/DL (ref 74–99)
HCT VFR BLD CALC: 35.8 % (ref 34–48)
HEMOGLOBIN: 12.1 G/DL (ref 11.5–15.5)
IMMATURE GRANULOCYTES #: 0.06 E9/L
IMMATURE GRANULOCYTES %: 0.4 % (ref 0–5)
LIPASE: 27 U/L (ref 13–60)
LYMPHOCYTES ABSOLUTE: 1.07 E9/L (ref 1.5–4)
LYMPHOCYTES RELATIVE PERCENT: 7.8 % (ref 20–42)
MAGNESIUM: 2 MG/DL (ref 1.6–2.6)
MCH RBC QN AUTO: 31.6 PG (ref 26–35)
MCHC RBC AUTO-ENTMCNC: 33.8 % (ref 32–34.5)
MCV RBC AUTO: 93.5 FL (ref 80–99.9)
MONOCYTES ABSOLUTE: 1.1 E9/L (ref 0.1–0.95)
MONOCYTES RELATIVE PERCENT: 8 % (ref 2–12)
NEUTROPHILS ABSOLUTE: 11.41 E9/L (ref 1.8–7.3)
NEUTROPHILS RELATIVE PERCENT: 82.7 % (ref 43–80)
PDW BLD-RTO: 12.2 FL (ref 11.5–15)
PLATELET # BLD: 259 E9/L (ref 130–450)
PMV BLD AUTO: 9.6 FL (ref 7–12)
POTASSIUM REFLEX MAGNESIUM: 3.4 MMOL/L (ref 3.5–5)
RBC # BLD: 3.83 E12/L (ref 3.5–5.5)
SODIUM BLD-SCNC: 135 MMOL/L (ref 132–146)
TOTAL PROTEIN: 7.1 G/DL (ref 6.4–8.3)
WBC # BLD: 13.8 E9/L (ref 4.5–11.5)

## 2021-05-03 PROCEDURE — 6370000000 HC RX 637 (ALT 250 FOR IP): Performed by: STUDENT IN AN ORGANIZED HEALTH CARE EDUCATION/TRAINING PROGRAM

## 2021-05-03 PROCEDURE — 36415 COLL VENOUS BLD VENIPUNCTURE: CPT

## 2021-05-03 PROCEDURE — 80053 COMPREHEN METABOLIC PANEL: CPT

## 2021-05-03 PROCEDURE — 85025 COMPLETE CBC W/AUTO DIFF WBC: CPT

## 2021-05-03 PROCEDURE — 6370000000 HC RX 637 (ALT 250 FOR IP): Performed by: FAMILY MEDICINE

## 2021-05-03 PROCEDURE — 99238 HOSP IP/OBS DSCHRG MGMT 30/<: CPT | Performed by: FAMILY MEDICINE

## 2021-05-03 PROCEDURE — 2580000003 HC RX 258: Performed by: STUDENT IN AN ORGANIZED HEALTH CARE EDUCATION/TRAINING PROGRAM

## 2021-05-03 PROCEDURE — 83690 ASSAY OF LIPASE: CPT

## 2021-05-03 PROCEDURE — 83735 ASSAY OF MAGNESIUM: CPT

## 2021-05-03 RX ORDER — NICOTINE 21 MG/24HR
1 PATCH, TRANSDERMAL 24 HOURS TRANSDERMAL DAILY
Qty: 30 PATCH | Refills: 3 | Status: SHIPPED | OUTPATIENT
Start: 2021-05-04 | End: 2021-06-17

## 2021-05-03 RX ORDER — HYDROCODONE BITARTRATE AND ACETAMINOPHEN 5; 325 MG/1; MG/1
1 TABLET ORAL EVERY 4 HOURS PRN
Status: DISCONTINUED | OUTPATIENT
Start: 2021-05-03 | End: 2021-05-03 | Stop reason: HOSPADM

## 2021-05-03 RX ORDER — HYDROCODONE BITARTRATE AND ACETAMINOPHEN 5; 325 MG/1; MG/1
1 TABLET ORAL EVERY 6 HOURS PRN
Qty: 12 TABLET | Refills: 0 | Status: SHIPPED | OUTPATIENT
Start: 2021-05-03 | End: 2021-05-06

## 2021-05-03 RX ADMIN — PANTOPRAZOLE SODIUM 40 MG: 40 TABLET, DELAYED RELEASE ORAL at 06:34

## 2021-05-03 RX ADMIN — ACETAMINOPHEN 650 MG: 325 TABLET ORAL at 03:22

## 2021-05-03 RX ADMIN — HYDROXYCHLOROQUINE SULFATE 400 MG: 200 TABLET ORAL at 08:53

## 2021-05-03 RX ADMIN — HYDROCODONE BITARTRATE AND ACETAMINOPHEN 1 TABLET: 5; 325 TABLET ORAL at 13:35

## 2021-05-03 RX ADMIN — THERA TABS 1 TABLET: TAB at 08:54

## 2021-05-03 RX ADMIN — SODIUM CHLORIDE: 9 INJECTION, SOLUTION INTRAVENOUS at 08:32

## 2021-05-03 RX ADMIN — ASPIRIN 81 MG: 81 TABLET, FILM COATED ORAL at 08:55

## 2021-05-03 RX ADMIN — Medication 100 MG: at 08:54

## 2021-05-03 RX ADMIN — BUSPIRONE HYDROCHLORIDE 7.5 MG: 5 TABLET ORAL at 08:53

## 2021-05-03 RX ADMIN — LISINOPRIL 10 MG: 10 TABLET ORAL at 08:54

## 2021-05-03 RX ADMIN — HYDROCODONE BITARTRATE AND ACETAMINOPHEN 1 TABLET: 5; 325 TABLET ORAL at 08:56

## 2021-05-03 RX ADMIN — FLUOXETINE 20 MG: 20 CAPSULE ORAL at 08:54

## 2021-05-03 RX ADMIN — FOLIC ACID 1 MG: 1 TABLET ORAL at 08:55

## 2021-05-03 RX ADMIN — AMLODIPINE BESYLATE 5 MG: 5 TABLET ORAL at 08:54

## 2021-05-03 ASSESSMENT — PAIN DESCRIPTION - DESCRIPTORS
DESCRIPTORS: HEADACHE
DESCRIPTORS: DISCOMFORT

## 2021-05-03 ASSESSMENT — PAIN SCALES - GENERAL
PAINLEVEL_OUTOF10: 6
PAINLEVEL_OUTOF10: 4
PAINLEVEL_OUTOF10: 0

## 2021-05-03 ASSESSMENT — PAIN - FUNCTIONAL ASSESSMENT
PAIN_FUNCTIONAL_ASSESSMENT: PREVENTS OR INTERFERES SOME ACTIVE ACTIVITIES AND ADLS
PAIN_FUNCTIONAL_ASSESSMENT: PREVENTS OR INTERFERES SOME ACTIVE ACTIVITIES AND ADLS

## 2021-05-03 ASSESSMENT — PAIN DESCRIPTION - PAIN TYPE: TYPE: ACUTE PAIN

## 2021-05-03 ASSESSMENT — PAIN DESCRIPTION - LOCATION: LOCATION: ABDOMEN;HEAD

## 2021-05-03 NOTE — DISCHARGE SUMMARY
Discharge Summary     Date:5/3/2021  Patient Name: Trina Garcia     YOB: 1970     Age: 53 y.o. MRN: [id number]    Admit Date:5/1/2021    Discharge Date: 05/03/21  Discharged Condition:good    Discharge Diagnoses   Principal Problem (Resolved):    Alcohol-induced acute pancreatitis without infection or necrosis  Active Problems:    Anxiety    HTN (hypertension)    Depression    Tobacco use    Dermatomyositis ([address]  Resolved Problems:    Hyponatremia    Acute pancreatitis      Hospital Stay   Narrative of Hospital Course:  Trina Garcia was admitted on 5/1/2021 with abdominal pain. The abdominal pain started Friday morning, at which time patient went to see her primary care provider. Her lipase at that time was 103. She denied severe pain at that time and therefore was not hospitalized. Saturday evening patient's abdominal pain became much worse and she presented to the ED. Patient was admitted for alcohol induced pancreatitis. In the ED patient received a normal saline bolus, nausea control, pain control including fentanyl, Dilaudid, and morphine. During admission, patient's abdominal pain was controlled and her lipase continued to trend down. Patient tolerated a liquid diet, and was progressed to a general diet with only minimal nausea. Upon day of discharge patient's lipase was 27, and she stated her pain was controlled at that time. Patient requested to have additional pain control in the following days until she could see her primary care provider. This was provided.     Consultants:   IP CONSULT TO FAMILY MEDICINE    Surgeries/Procedures Performed:        Treatments:   IV hydration, analgesia: Dilaudid, Morphine and fentayl, and anticoagulation: LMW heparin    Significant Diagnostic Studies:   Recent Labs:  CBC:   Lab Results   Component Value Date    WBC 13.8 05/03/2021    RBC 3.83 05/03/2021    HGB 12.1 05/03/2021    HCT 35.8 05/03/2021    MCV 93.5 05/03/2021    MCH 31.6 05/03/2021 MCHC 33.8 05/03/2021    RDW 12.2 05/03/2021     05/03/2021     CMP:    Lab Results   Component Value Date    GLUCOSE 82 05/03/2021     05/03/2021    K 3.4 05/03/2021     05/03/2021    CO2 23 05/03/2021    BUN 5 05/03/2021    CREATININE 0.5 05/03/2021    ANIONGAP 12 05/03/2021    ALKPHOS 101 05/03/2021    ALT 15 05/03/2021    AST 21 05/03/2021    BILITOT 0.4 05/03/2021    LABALBU 4.0 05/03/2021    LABGLOM >60 05/03/2021    GFRAA >60 05/03/2021    PROT 7.1 05/03/2021    CALCIUM 9.1 05/03/2021     PT/INR:    Lab Results   Component Value Date    PROTIME 12.5 11/02/2020    INR 1.1 11/02/2020     PTT:   Lab Results   Component Value Date    APTT 32.2 12/27/2015     Results for JONAS RIZVI (MRN [id number]) as of 5/4/2021 12:29   Ref. Range 4/30/2021 16:08 5/1/2021 21:56 5/3/2021 12:35   Lipase Latest Ref Range: 13 - 60 U/L 103 (H) 87 (H) 27       Radiology Last 7 Days:  Ct Abdomen Pelvis W Iv Contrast Additional Contrast? None    Result Date: 5/2/2021  Inflammatory changes adjacent to and caudal to the head of the pancreas and uncinate process consistent with acute pancreatitis. No organized peripancreatic fluid collections. Discharge Physical Exam:   Physical Exam   Constitutional:       General: She is not in acute distress. Appearance: Normal appearance. HENT:      Mouth/Throat:      Mouth: Mucous membranes are moist.      Pharynx: Oropharynx is clear. Eyes:      General:         Right eye: No discharge. Left eye: No discharge. Neck:      Musculoskeletal: Normal range of motion. No neck rigidity. Cardiovascular:      Rate and Rhythm: Normal rate and regular rhythm. Pulmonary:      Effort: Pulmonary effort is normal.      Breath sounds: Normal breath sounds. No wheezing. Abdominal:      General: Bowel sounds are normal.      Palpations: Abdomen is soft.       Tenderness: Mild abdominal tenderness in epigastrium with deep palpation  Musculoskeletal:      Right lower leg: No edema. Left lower leg: No edema. Skin:     General: Skin is warm and dry. Neurological:      General: No focal deficit present. Mental Status: She is alert and oriented to person, place, and time. Discharge Plan   Disposition: Home    Provider Follow-Up:   MD Aime Medina [address]  [place] [address]  [phone]    Schedule an appointment as soon as possible for a visit in 3 days  Hospital Follow Up       Hospital/Incidental Findings Requiring Follow-Up:  Trace intact blood in the urine on UA    Patient Instructions   Diet: low fat, low cholesterol diet and Pancreatitis diet as provided    Activity: activity as tolerated    Other Instructions: Follow-up with your primary care provider, discontinue all alcohol consumption as this may be the trigger for your pancreatitis. Information about pancreatitis diet provided.     Discharge Medications         Medication List      ASK your doctor about these medications    amLODIPine-benazepril 5-10 MG per capsule  Commonly known as: LOTREL  take 1 capsule by mouth once daily     aspirin 81 MG EC tablet     BL FLAX SEED OIL PO     busPIRone 7.5 MG tablet  Commonly known as: BUSPAR  take 1 tablet by mouth twice a day     CALCIUM-VITAMIN D PO     cyclobenzaprine 10 MG tablet  Commonly known as: FLEXERIL  Take 1 tablet by mouth nightly as needed for Muscle spasms     fish oil 1000 MG Caps     FLUoxetine 20 MG capsule  Commonly known as: PROZAC  take 1 capsule by mouth every morning 2 capsules every evening     * HAIR/SKIN/NAILS PO     * CENTRA-JACOBY PO     * hydrocortisone 2.5 % cream     * hydrocortisone 2.5 % ointment     hydroxychloroquine 200 MG tablet  Commonly known as: PLAQUENIL     hypromellose 0.4 % Soln ophthalmic solution  Commonly known as: Artificial Tears  Place 1 drop into both eyes every 4 hours as needed (dry eyes)     omeprazole 20 MG delayed release capsule  Commonly known as: PRILOSEC  Take 1 capsule

## 2021-05-03 NOTE — PROGRESS NOTES
[address]  Progress Note    Date:5/2/2021       DSRE:[id number]  Patient Tao Aragon     Date of Birth:[birth year]     Age:50 y.o. Chief Complaint   Patient presents with    Pancreatitis     had blood work done by PCP and was dx and told not to eat, tried food today        Subjective   HPI: Patient found to have elevated lipase of 103 with abdominal pain as outpatient on Friday 4/30. Tolerated at home treatment until Saturday evening 5/1 when she developed severe abdominal pain. In the ED, pain was 1/11, periumbilical.  She was hypertensive to 181/95, tachycardic to 103, and had elevated WBC count of 15.3, sodium of 127. CT showed pancreatitis  Pancreatitis was diagnosed. She reports drinking a 12 pack of beer weekly with last drink being Thursday 4/29. Patient smokes 0.5 packs per day. Patient states that her abdominal pain is greatly improved today. She states is more of a discomfort than pain. She has not required IV pain medication since yesterday. She states that she is ready to eat something.       Denies chest pain, palpitations, shortness of breath, wheezing, abdominal pain, diarrhea    Review of Systems   Review of Systems as above, otherwise negative    Medications   Scheduled Meds:    aspirin  81 mg Oral Daily    busPIRone  7.5 mg Oral BID    FLUoxetine  20 mg Oral Daily    FLUoxetine  40 mg Oral Nightly    hydroxychloroquine  400 mg Oral Daily    pantoprazole  40 mg Oral QAM AC    sodium chloride flush  5-40 mL Intravenous 2 times per day    enoxaparin  40 mg Subcutaneous Daily    nicotine  1 patch Transdermal Daily    amLODIPine  5 mg Oral Daily    And    lisinopril  10 mg Oral Daily    multivitamin  1 tablet Oral Daily    folic acid  1 mg Oral Daily    thiamine  100 mg Oral Daily     Continuous Infusions:    sodium chloride      sodium chloride 200 mL/hr at 05/02/21 1613     PRN Meds: cyclobenzaprine, sodium chloride flush, sodium edema.   Skin:     General: Skin is warm and dry. Neurological:      General: No focal deficit present. Mental Status: She is alert and oriented to person, place, and time. Labs/Imaging/Diagnostics   Labs:  CBC:  Recent Labs     04/30/21 1608 05/01/21 2156   WBC 11.8* 15.3*   RBC 4.22 3.95   HGB 13.5 12.7   HCT 40.3 36.6   MCV 95.5 92.7   RDW 12.0 12.0    334     CHEMISTRIES:  Recent Labs     04/30/21 1608 05/01/21 2156 05/02/21  0505    127* 133   K 3.9 3.9 3.6    94* 99   CO2 24 22 22   BUN 8 7 5*   CREATININE 0.7 0.5 0.5   GLUCOSE 94 127* 126*   MG  --   --  1.7     PT/INR:No results for input(s): PROTIME, INR in the last 72 hours. APTT:No results for input(s): APTT in the last 72 hours. LIVER PROFILE:  Recent Labs     04/30/21 1608 05/01/21 2156 05/02/21  0505   AST 24 29 21   ALT 17 18 16   BILITOT <0.2 0.3 0.4   ALKPHOS 81 91 90       Imaging Last 24 Hours:  Ct Abdomen Pelvis W Iv Contrast Additional Contrast? None    Result Date: 5/2/2021  EXAMINATION: CT OF THE ABDOMEN AND PELVIS WITH CONTRAST 5/1/2021 11:36 pm TECHNIQUE: CT of the abdomen and pelvis was performed with the administration of intravenous contrast. Multiplanar reformatted images are provided for review. Dose modulation, iterative reconstruction, and/or weight based adjustment of the mA/kV was utilized to reduce the radiation dose to as low as reasonably achievable. COMPARISON: November 1, 2020 HISTORY: ORDERING SYSTEM PROVIDED HISTORY: pain  hx pancreatitis TECHNOLOGIST PROVIDED HISTORY: Additional Contrast?->None Reason for exam:->pain  hx pancreatitis Decision Support Exception - unselect if not a suspected or confirmed emergency medical condition->Emergency Medical Condition (MA) FINDINGS: Lower Chest: The lung bases are clear. Organs: The liver, spleen, adrenals, gallbladder and kidneys are within normal limits.  There are inflammatory changes adjacent to and caudal to the head of the pancreas and uncinate process, consistent with acute pancreatitis. There are no organized peripancreatic fluid collections. Pancreatic duct is not dilated. GI/Bowel: No evidence of bowel obstruction or free intraperitoneal air. No colitis or diverticulitis. No signs of appendicitis. Pelvis: Status post hysterectomy. Urinary bladder within normal limits. No free fluid in the pelvis. Peritoneum/Retroperitoneum: No abdominal aortic aneurysm. Nonspecific retroperitoneal nodes. Bones/Soft Tissues: No acute bony pathology. Inflammatory changes adjacent to and caudal to the head of the pancreas and uncinate process consistent with acute pancreatitis. No organized peripancreatic fluid collections.        Assessment        Hospital Problems           Last Modified POA    * (Principal) Alcohol-induced acute pancreatitis without infection or necrosis 5/2/2021 Yes    Anxiety 5/2/2021 Yes    HTN (hypertension) 5/2/2021 Yes    Depression (Chronic) 5/2/2021 Yes    Hyponatremia 5/2/2021 Yes    Tobacco use 5/2/2021 Yes    Dermatomyositis ([facility] [address] 5/2/2021 Yes    Acute pancreatitis 5/2/2021 Yes          Plan:        PLAN:  Alcohol-induced acute pancreatitis without infection or necrosis  - Telemetry   - Start 0.9% NaCl at 125 cc/hr   - Labs: CBC, CMP, lactate dehydrogenase, lipid panel, and magnesium   - NPO (sips with medications)   - I/Os -800 mL  - Started clear liquid diet  - Nausea control: IV Zofran 4 mg q 6 hrs PRN or PO Phenergan 12.5 mg every 6 hrs PRN   - Pain control adjusted to Norco 5-325 mg every 4 hours as needed for moderate pain, and Dilaudid 0.5 mg every 4 hours as needed for moderate pain refractory to Norco  - CIWA scale not in place     Hyponatremia   Likely secondary to decreased PO intake   - On 0.9% NaCl at 125 cc/hr for acute pancreatitis, so will have to monitor Na+ level closely   - Labs: CMP, serum osmolality   - Urine studies: urine electrolytes, urine osmolality, urine creatinine       HTN   - Vitals every 4 hours - Continue home amlodipine-benazepril 5-10 mg daily  - IV labetalol 10 mg every 4 hrs PRN with parameters: SBP> 160 or DBP> 100      Dermatomyositis  - Continue home Plaquenil 400 mg daily  - Continue home prednisone 1 mg daily     Depression and anxiety   - Continue home Buspar 7.5 mg twice daily  - Continue home Prozac: 20 mg AM and 40 mg PM      Tobacco use   - Nicotine patch 14 mg/24 hours     Continue additional home medications:   - Flexeril 10 mg nightly PRN for muscle spasms   - Protonix 40 mg daily substituted for home omeprazole 20 mg daily    DVT Prophylaxis: lovenox  GI Prophylaxis: Protonix  Diet: Diet NPO Effective Now Exceptions are: Sips of Water with Meds   Code: Full   Advance Directives     Power of  Living Will ACP-Advance Directive ACP-Power of     Not on File Not on File Not on File Not on File           Electronically signed by Paige Worley DO PGY-1 Family Medicine Resident on 05/02/21 at 9:16 PM

## 2021-05-13 ENCOUNTER — TELEPHONE (OUTPATIENT)
Dept: ADMINISTRATIVE | Age: 51
End: 2021-05-13

## 2021-05-13 ENCOUNTER — NURSE TRIAGE (OUTPATIENT)
Dept: OTHER | Facility: CLINIC | Age: 51
End: 2021-05-13

## 2021-05-13 NOTE — TELEPHONE ENCOUNTER
Pt called [facility] appt today. Pt was diagnosed with pancreatitis while she admitted--[facility]/Bdm, discharged on 5/3. Pt states this Tuesday, she began to feel the same symptoms, severe abd pain and noticed she had mucous in her stool.       Warm transfer to Karine Wilkinson RN  Nurse Access

## 2021-05-13 NOTE — TELEPHONE ENCOUNTER
Received call from Meka Toscano at pre-service center [facility] with [employer] Complaint. Brief description of triage: Follow up post hospitalization for pancreatitis,  unresolved    Triage indicates for patient to see PCP today    Care advice provided, patient verbalizes understanding; denies any other questions or concerns; instructed to call back for any new or worsening symptoms. Writer provided warm transfer to Steve Davies at [facility] for appointment scheduling. Attention Provider: Thank you for allowing me to participate in the care of your patient. The patient was connected to triage in response to information provided to the [facility]. Please do not respond through this encounter as the response is not directed to a shared pool. Reason for Disposition   Vomiting and abdomen looks much more swollen than usual    Answer Assessment - Initial Assessment Questions  1. LOCATION: \"Where does it hurt? \"       Upper left side, more in the middle    2. RADIATION: \"Does the pain shoot anywhere else? \" (e.g., chest, back)      Radiates down and also into the back    3. ONSET: \"When did the pain begin? \" (e.g., minutes, hours or days ago)       Since 4/30/21    4. SUDDEN: \"Gradual or sudden onset? \"      Sudden    5. PATTERN \"Does the pain come and go, or is it constant? \"     - If constant: \"Is it getting better, staying the same, or worsening? \"       (Note: Constant means the pain never goes away completely; most serious pain is constant and it progresses)      - If intermittent: \"How long does it last?\" \"Do you have pain now? \"      (Note: Intermittent means the pain goes away completely between bouts)      Constant    6. SEVERITY: \"How bad is the pain? \"  (e.g., Scale 1-10; mild, moderate, or severe)     - MILD (1-3): doesn't interfere with normal activities, abdomen soft and not tender to touch      - MODERATE (4-7): interferes with normal activities or awakens from sleep, tender to touch      - SEVERE (8-10): excruciating pain, doubled over, unable to do any normal activities        5 moderate    7. RECURRENT SYMPTOM: \"Have you ever had this type of abdominal pain before? \" If so, ask: \"When was the last time? \" and \"What happened that time? \"       Yes, seen in Nov of last year    8. AGGRAVATING FACTORS: \"Does anything seem to cause this pain? \" (e.g., foods, stress, alcohol)      Food, denies alcohol, stress is a factor, derma mitosis    9. CARDIAC SYMPTOMS: \"Do you have any of the following symptoms: chest pain, difficulty breathing, sweating, nausea? \"      Denies    10. OTHER SYMPTOMS: \"Do you have any other symptoms? \" (e.g., fever, vomiting, diarrhea)        Nausea, mucus in stool    11. PREGNANCY: \"Is there any chance you are pregnant? \" \"When was your last menstrual period? \"        no    Protocols used: ABDOMINAL PAIN - UPPER-ADULT-OH

## 2021-05-13 NOTE — TELEPHONE ENCOUNTER
Call transferred to me, per nurse triage patient needs seen today by Dr Sandi Galvan or she was to  go thru express care.  has no openings and she refused express care. Please call her and advise.  Thanks

## 2021-06-16 ENCOUNTER — TELEPHONE (OUTPATIENT)
Dept: ADMINISTRATIVE | Age: 51
End: 2021-06-16

## 2021-06-16 NOTE — TELEPHONE ENCOUNTER
Patient calling for appointment with PCP. States her pancreatitis is acting up. She is experiencing pains and trouble eating. States she doesn't believe she is doing anything that could cause it to act up. Advise patient to utilize express care as PCP is not in office.

## 2021-06-17 ENCOUNTER — OFFICE VISIT (OUTPATIENT)
Dept: FAMILY MEDICINE CLINIC | Age: 51
End: 2021-06-17
Payer: MEDICAID

## 2021-06-17 VITALS
TEMPERATURE: 97.9 F | HEART RATE: 95 BPM | OXYGEN SATURATION: 100 % | DIASTOLIC BLOOD PRESSURE: 84 MMHG | HEIGHT: 65 IN | BODY MASS INDEX: 24.83 KG/M2 | WEIGHT: 149 LBS | SYSTOLIC BLOOD PRESSURE: 140 MMHG

## 2021-06-17 DIAGNOSIS — F41.9 ANXIETY: ICD-10-CM

## 2021-06-17 DIAGNOSIS — K86.1 CHRONIC PANCREATITIS, UNSPECIFIED PANCREATITIS TYPE (HCC): Primary | ICD-10-CM

## 2021-06-17 PROCEDURE — 4004F PT TOBACCO SCREEN RCVD TLK: CPT | Performed by: FAMILY MEDICINE

## 2021-06-17 PROCEDURE — 99214 OFFICE O/P EST MOD 30 MIN: CPT | Performed by: FAMILY MEDICINE

## 2021-06-17 PROCEDURE — G8420 CALC BMI NORM PARAMETERS: HCPCS | Performed by: FAMILY MEDICINE

## 2021-06-17 PROCEDURE — G8427 DOCREV CUR MEDS BY ELIG CLIN: HCPCS | Performed by: FAMILY MEDICINE

## 2021-06-17 RX ORDER — LORATADINE 10 MG/1
10 CAPSULE, LIQUID FILLED ORAL DAILY
COMMUNITY
End: 2021-07-08

## 2021-06-17 RX ORDER — BUSPIRONE HYDROCHLORIDE 10 MG/1
10 TABLET ORAL 2 TIMES DAILY
Qty: 90 TABLET | Refills: 1 | Status: SHIPPED
Start: 2021-06-17 | End: 2021-08-16

## 2021-06-17 RX ORDER — PANCRELIPASE 15000; 3000; 9500 [USP'U]/1; [USP'U]/1; [USP'U]/1
1 CAPSULE, DELAYED RELEASE ORAL
Qty: 90 CAPSULE | Refills: 0 | Status: SHIPPED
Start: 2021-06-17 | End: 2021-07-08

## 2021-06-17 NOTE — PROGRESS NOTES
but not having acute pain. Going to add Creon and see if this helps reduce some of her bowel symptoms. She was counseled to try to stick to a less than 20 g fat daily diet. She will follow up in a few weeks. Anxiety, worsened recently  -Increase    busPIRone (BUSPAR) 10 MG tablet; Take 1 tablet by mouth 2 times daily May increase to three times daily during more anxious episodes. Continue Prozac at same dose. Follow-up in about 3 weeks or as scheduled. Patient counseled to follow up sooner or seek more acute care if symptoms worsening or not improving according to plan. Electronically signed by Rosy Boo MD on 6/17/2021    _________________________________________________________  Current Outpatient Medications on File Prior to Visit   Medication Sig Dispense Refill    loratadine (CLARITIN) 10 MG capsule Take 10 mg by mouth daily      triamcinolone (KENALOG) 0.1 % cream Apply twice a day to active rash on hands and elbow.  FLUoxetine (PROZAC) 20 MG capsule take 1 capsule by mouth every morning 2 capsules every evening 90 capsule 5    hydrocortisone 2.5 % ointment apply to affected area ON face twice a day (DO NOT PUT INSIDE EYES)      amLODIPine-benazepril (LOTREL) 5-10 MG per capsule take 1 capsule by mouth once daily 30 capsule 5    aspirin 81 MG EC tablet Take 81 mg by mouth daily      hydrocortisone 2.5 % cream Apply twice a day to active rash on face.       cyclobenzaprine (FLEXERIL) 10 MG tablet Take 1 tablet by mouth nightly as needed for Muscle spasms 30 tablet 5    omeprazole (PRILOSEC) 20 MG delayed release capsule Take 1 capsule by mouth Daily 30 capsule 5    Multiple Vitamins-Minerals (CENTRA-JACOBY PO) Take by mouth daily      Lifitegrast (XIIDRA) 5 % SOLN Apply to eye 2 times daily       hydroxychloroquine (PLAQUENIL) 200 MG tablet Take 400 mg by mouth daily       Omega-3 Fatty Acids (FISH OIL) 1000 MG CAPS Take 3,000 mg by mouth daily       hypromellose (ARTIFICIAL TEARS) 0.4 % SOLN ophthalmic solution Place 1 drop into both eyes every 4 hours as needed (dry eyes) 15 mL 0    vitamin D (CHOLECALCIFEROL) 1000 UNIT TABS tablet Take 1,000 Units by mouth every morning       Multiple Vitamins-Minerals (HAIR/SKIN/NAILS PO) Take 1 tablet by mouth every morning       B Complex Vitamins (VITAMIN B COMPLEX PO) Take 1 tablet by mouth every morning       CALCIUM-VITAMIN D PO Take 1 tablet by mouth every morning        No current facility-administered medications on file prior to visit.        Patient Active Problem List   Diagnosis Code    Anxiety F41.9    HTN (hypertension) I10    Raynaud's phenomenon I73.00    Chronic daily headache R51.9    Leukocytosis D72.829    Cigarette smoker F17.210    Depression F32.9    Elevated LFTs R79.89    Peripheral neuropathy G62.9    Hx of Raynaud's syndrome Z86.79    Tobacco use Z72.0    Dermatomyositis ([address] M33.90     _________________________________________________________  Past Medical History:   Diagnosis Date    Anxiety     Depression     Herniated disc     Hx of Raynaud's syndrome 1/25/2021    Hypertension     Peripheral neuropathy 1/25/2021    Pneumonia     Raynaud's disease     TMJ (dislocation of temporomandibular joint)        Family History   Problem Relation Age of Onset    Heart Disease Father         CHF       Past Surgical History:   Procedure Laterality Date    HYSTERECTOMY      OVARIAN CYST REMOVAL      benign mass removal    SKIN BIOPSY Left     left hand       Social History     Tobacco Use    Smoking status: Current Every Day Smoker     Packs/day: 0.25     Years: 30.00     Pack years: 7.50     Types: Cigarettes    Smokeless tobacco: Never Used   Vaping Use    Vaping Use: Never used   Substance Use Topics    Alcohol use: Yes     Comment: occasional    Drug use: No       Chart reviewed and updated where appropriate for PMH, Fam, and Soc

## 2021-07-08 ENCOUNTER — OFFICE VISIT (OUTPATIENT)
Dept: FAMILY MEDICINE CLINIC | Age: 51
End: 2021-07-08
Payer: MEDICAID

## 2021-07-08 VITALS
TEMPERATURE: 97.8 F | HEART RATE: 94 BPM | BODY MASS INDEX: 24.83 KG/M2 | DIASTOLIC BLOOD PRESSURE: 86 MMHG | WEIGHT: 149 LBS | HEIGHT: 65 IN | SYSTOLIC BLOOD PRESSURE: 140 MMHG | OXYGEN SATURATION: 100 %

## 2021-07-08 DIAGNOSIS — I73.81 ERYTHROMELALGIA (HCC): Primary | ICD-10-CM

## 2021-07-08 DIAGNOSIS — K86.1 CHRONIC PANCREATITIS, UNSPECIFIED PANCREATITIS TYPE (HCC): ICD-10-CM

## 2021-07-08 DIAGNOSIS — I10 ESSENTIAL HYPERTENSION: ICD-10-CM

## 2021-07-08 DIAGNOSIS — Z12.11 COLON CANCER SCREENING: ICD-10-CM

## 2021-07-08 PROCEDURE — 99214 OFFICE O/P EST MOD 30 MIN: CPT | Performed by: FAMILY MEDICINE

## 2021-07-08 PROCEDURE — 4004F PT TOBACCO SCREEN RCVD TLK: CPT | Performed by: FAMILY MEDICINE

## 2021-07-08 PROCEDURE — G8427 DOCREV CUR MEDS BY ELIG CLIN: HCPCS | Performed by: FAMILY MEDICINE

## 2021-07-08 PROCEDURE — G8420 CALC BMI NORM PARAMETERS: HCPCS | Performed by: FAMILY MEDICINE

## 2021-07-08 RX ORDER — AMLODIPINE BESYLATE AND BENAZEPRIL HYDROCHLORIDE 5; 20 MG/1; MG/1
1 CAPSULE ORAL DAILY
Qty: 30 CAPSULE | Refills: 3 | Status: SHIPPED
Start: 2021-07-08 | End: 2021-11-04

## 2021-07-08 NOTE — PROGRESS NOTES
[facility]  Office Progress Note - Dr. Kori Moore  7/8/21    CC:   Chief Complaint   Patient presents with    Rash     ongoing rash around left eye. BP (!) 140/86 (Site: Left Upper Arm, Position: Sitting, Cuff Size: Medium Adult)   Pulse 94   Temp 97.8 °F (36.6 °C) (Temporal)   Ht 5' 5\" (1.651 m)   Wt 149 lb (67.6 kg)   SpO2 100%   BMI 24.79 kg/m²   Wt Readings from Last 3 Encounters:   07/08/21 149 lb (67.6 kg)   06/17/21 149 lb (67.6 kg)   05/01/21 156 lb (70.8 kg)       HPI:     Diarrhea  Took the creon for 4 days which seemed to make the diarrhea worse  Stopped taking it  Hasn't had pain or diarrhea since two days after stopping the creon  Now feels much better    Rash   Has had for three years  Around both eyes, L>R  Feels like it will break open and bleed  Possibly due to her dermatomyositis (DM), per patient  Goes away with hydrocortisone cream - was told she couldn't use the cream twice daily for many days in a row  Didn't have the rash while on methotrexate  Plaquinel helps it but it still flares up  Nothing seems to make it particularly worse    Hand Pain/ Heat Sensation in hands and feet  Hx Raynaud's, feels this is different  States her hands and feet get very hot and turn bright red  Worse at night  Very irritating to her  Was told Lyrica might help this    _________________________________________________________    Assessment / David Andreuma was seen today for rash. Diagnoses and all orders for this visit:    Erythromelalgia ([address]  I read a little more on the topic. There is no cure or definitive diagnostic test.  If this is the accurate diagnosis causing her hand and feet symptoms. There are a variety of management strategies, most of these involve cooling the extremities, some topical ointments and occasionally neuropathic pain medications. Colon cancer screening  -     Cologuard (For External Results Only);  Future  She elects for Cologuard from the choices. Chronic pancreatitis, unspecified pancreatitis type ([facility]  Rae did not seem to improve her symptoms greatly, but she seems to have had spontaneous resolution shortly after stopping the medication. So far that has been holding steady. Hypertension, blood pressure regularly not at goal recently. -   Increase amLODIPine-benazepril (LOTREL) 5-20 MG per capsule; Take 1 capsule by mouth daily For blood pressure. Return in about 4 months (around 11/8/2021). or as scheduled. Patient counseled to follow up sooner or seek more acute care if symptoms worsening or not improving according to plan. Electronically signed by Alyssa Pyle MD on 7/9/2021    _________________________________________________________  Current Outpatient Medications on File Prior to Visit   Medication Sig Dispense Refill    busPIRone (BUSPAR) 10 MG tablet Take 1 tablet by mouth 2 times daily May increase to three times daily during more anxious episodes. 90 tablet 1    triamcinolone (KENALOG) 0.1 % cream Apply twice a day to active rash on hands and elbow.  FLUoxetine (PROZAC) 20 MG capsule take 1 capsule by mouth every morning 2 capsules every evening 90 capsule 5    hydrocortisone 2.5 % ointment apply to affected area ON face twice a day (DO NOT PUT INSIDE EYES)      aspirin 81 MG EC tablet Take 81 mg by mouth daily      hydrocortisone 2.5 % cream Apply twice a day to active rash on face.       cyclobenzaprine (FLEXERIL) 10 MG tablet Take 1 tablet by mouth nightly as needed for Muscle spasms 30 tablet 5    omeprazole (PRILOSEC) 20 MG delayed release capsule Take 1 capsule by mouth Daily 30 capsule 5    Multiple Vitamins-Minerals (CENTRA-JACOBY PO) Take by mouth daily      Lifitegrast (XIIDRA) 5 % SOLN Apply to eye 2 times daily       hydroxychloroquine (PLAQUENIL) 200 MG tablet Take 400 mg by mouth daily       Omega-3 Fatty Acids (FISH OIL) 1000 MG CAPS Take 3,000 mg by mouth daily       hypromellose (ARTIFICIAL TEARS) 0.4 % SOLN ophthalmic solution Place 1 drop into both eyes every 4 hours as needed (dry eyes) 15 mL 0    vitamin D (CHOLECALCIFEROL) 1000 UNIT TABS tablet Take 1,000 Units by mouth every morning       Multiple Vitamins-Minerals (HAIR/SKIN/NAILS PO) Take 1 tablet by mouth every morning       B Complex Vitamins (VITAMIN B COMPLEX PO) Take 1 tablet by mouth every morning       CALCIUM-VITAMIN D PO Take 1 tablet by mouth every morning        No current facility-administered medications on file prior to visit.        Patient Active Problem List   Diagnosis Code    Anxiety F41.9    HTN (hypertension) I10    Raynaud's phenomenon I73.00    Chronic daily headache R51.9    Leukocytosis D72.829    Cigarette smoker F17.210    Depression F32.9    Elevated LFTs R79.89    Peripheral neuropathy G62.9    Hx of Raynaud's syndrome Z86.79    Tobacco use Z72.0    Dermatomyositis ([address] M33.90     _________________________________________________________  Past Medical History:   Diagnosis Date    Anxiety     Depression     Herniated disc     Hx of Raynaud's syndrome 1/25/2021    Hypertension     Peripheral neuropathy 1/25/2021    Pneumonia     Raynaud's disease     TMJ (dislocation of temporomandibular joint)        Family History   Problem Relation Age of Onset    Heart Disease Father         CHF       Past Surgical History:   Procedure Laterality Date    HYSTERECTOMY      OVARIAN CYST REMOVAL      benign mass removal    SKIN BIOPSY Left     left hand       Social History     Tobacco Use    Smoking status: Current Every Day Smoker     Packs/day: 0.25     Years: 30.00     Pack years: 7.50     Types: Cigarettes    Smokeless tobacco: Never Used   Vaping Use    Vaping Use: Never used   Substance Use Topics    Alcohol use: Yes     Comment: occasional    Drug use: No       Chart reviewed and updated where appropriate for PMH, Fam, and Soc Hx.  _________________________________________________________  ROS: POSITIVE: As in the HPI. Otherwise Pertinent negatives are negative.    __________________________________________________________  Physical Exam   Constitutional:    He is oriented to person, place, and time. He appears well-developed and well-nourished. HENT:    Right Ear: normal pinna   Left Ear: normal pinna   Nose: Nose normal.   Eyes:    Conjunctivae are normal.    Pupils are equal, round, and reactive to light. EOMI. Small area of mild erythema at lateral corner of left eye, with some flaking, no discharge noted  Neck:    Normal range of motion. No thyromegaly or nodules noted. No bruit. No LAD. Cardiovascular:    Normal rate, regular rhythm and normal heart sounds. II/VI systolic decrescendo murmur. No gallop and no friction rub. Pulmonary/Chest:    Effort normal and breath sounds normal.    No wheezes. No rales or rhonchi. Abdominal:    Soft. Bowel sounds are normal.    No distension. No tenderness. Musculoskeletal:    Normal range of motion. No joint swelling noted. No peripheral edema. Skin:    Skin is warm and dry. No rashes, lesions. Psychiatric:    He has a normal mood and affect. Normal groom and dress. No SI or HI.   ________________________________________________________    This note may have been created using dictation software.  Efforts were made to reduce errors, but some may persist.

## 2021-08-02 RX ORDER — OMEPRAZOLE 20 MG/1
CAPSULE, DELAYED RELEASE ORAL
Qty: 30 CAPSULE | Refills: 5 | Status: SHIPPED
Start: 2021-08-02 | End: 2022-01-27

## 2021-08-02 NOTE — TELEPHONE ENCOUNTER
Last Appointment:  7/8/2021  Future Appointments   Date Time Provider Yanet Quiroz   11/8/2021  9:40 AM Lata Cunningham  [address]

## 2021-08-15 DIAGNOSIS — F41.9 ANXIETY: ICD-10-CM

## 2021-08-15 DIAGNOSIS — I10 ESSENTIAL HYPERTENSION: ICD-10-CM

## 2021-08-16 RX ORDER — BUSPIRONE HYDROCHLORIDE 10 MG/1
TABLET ORAL
Qty: 90 TABLET | Refills: 1 | Status: SHIPPED
Start: 2021-08-16 | End: 2021-12-13

## 2021-08-16 RX ORDER — AMLODIPINE BESYLATE AND BENAZEPRIL HYDROCHLORIDE 5; 10 MG/1; MG/1
CAPSULE ORAL
Qty: 30 CAPSULE | Refills: 5 | OUTPATIENT
Start: 2021-08-16

## 2021-08-16 NOTE — TELEPHONE ENCOUNTER
Last Appointment:  7/8/2021  Future Appointments   Date Time Provider Yanet Quiroz   11/8/2021  9:40 AM Lidia Zuniga  [address]

## 2021-10-11 ENCOUNTER — TELEPHONE (OUTPATIENT)
Dept: FAMILY MEDICINE CLINIC | Age: 51
End: 2021-10-11

## 2021-10-11 NOTE — TELEPHONE ENCOUNTER
Voicemail left for patient to return our call at their earliest convenience. [facility] yet. Called to encourage pt to complete.

## 2021-11-04 RX ORDER — AMLODIPINE BESYLATE AND BENAZEPRIL HYDROCHLORIDE 5; 20 MG/1; MG/1
CAPSULE ORAL
Qty: 30 CAPSULE | Refills: 5 | Status: SHIPPED
Start: 2021-11-04 | End: 2022-05-20

## 2021-11-04 NOTE — TELEPHONE ENCOUNTER
Last Appointment:  7/8/2021  Future Appointments   Date Time Provider Yanet Quiroz   11/8/2021  9:40 AM Mayelin Deluca  [address]

## 2021-12-13 DIAGNOSIS — F41.9 ANXIETY: ICD-10-CM

## 2021-12-13 RX ORDER — BUSPIRONE HYDROCHLORIDE 10 MG/1
TABLET ORAL
Qty: 90 TABLET | Refills: 1 | Status: SHIPPED
Start: 2021-12-13 | End: 2022-02-07

## 2021-12-13 NOTE — TELEPHONE ENCOUNTER
Last Appointment:  7/8/21  Future Appointments   Date Time Provider Yanet Quiroz   1/7/2022  2:00 PM Abimael Muñoz  [address]

## 2021-12-24 DIAGNOSIS — F41.9 ANXIETY: ICD-10-CM

## 2021-12-27 NOTE — TELEPHONE ENCOUNTER
Last Appointment:  7/8/2021  Future Appointments   Date Time Provider Yanet Quiroz   1/7/2022  2:00 PM Jerome Wang  [address] [place] [address]

## 2021-12-28 RX ORDER — FLUOXETINE HYDROCHLORIDE 20 MG/1
CAPSULE ORAL
Qty: 90 CAPSULE | Refills: 5 | Status: SHIPPED
Start: 2021-12-28 | End: 2022-07-08

## 2022-01-07 ENCOUNTER — OFFICE VISIT (OUTPATIENT)
Dept: FAMILY MEDICINE CLINIC | Age: 52
End: 2022-01-07
Payer: MEDICAID

## 2022-01-07 VITALS
BODY MASS INDEX: 24.66 KG/M2 | HEIGHT: 65 IN | TEMPERATURE: 98 F | DIASTOLIC BLOOD PRESSURE: 80 MMHG | SYSTOLIC BLOOD PRESSURE: 130 MMHG | WEIGHT: 148 LBS | OXYGEN SATURATION: 100 % | HEART RATE: 86 BPM

## 2022-01-07 DIAGNOSIS — M79.671 BILATERAL FOOT PAIN: Primary | ICD-10-CM

## 2022-01-07 DIAGNOSIS — K86.1 CHRONIC PANCREATITIS, UNSPECIFIED PANCREATITIS TYPE (HCC): ICD-10-CM

## 2022-01-07 DIAGNOSIS — I99.9 VASCULAR PROBLEM: ICD-10-CM

## 2022-01-07 DIAGNOSIS — I10 ESSENTIAL HYPERTENSION: ICD-10-CM

## 2022-01-07 DIAGNOSIS — M79.672 BILATERAL FOOT PAIN: Primary | ICD-10-CM

## 2022-01-07 PROBLEM — M33.90 DERMATOMYOSITIS (HCC): Status: RESOLVED | Noted: 2021-05-02 | Resolved: 2022-01-07

## 2022-01-07 PROBLEM — M33.13 DERMATOMYOSITIS (HCC): Status: RESOLVED | Noted: 2021-05-02 | Resolved: 2022-01-07

## 2022-01-07 PROCEDURE — 90674 CCIIV4 VAC NO PRSV 0.5 ML IM: CPT | Performed by: FAMILY MEDICINE

## 2022-01-07 PROCEDURE — G8420 CALC BMI NORM PARAMETERS: HCPCS | Performed by: FAMILY MEDICINE

## 2022-01-07 PROCEDURE — G8482 FLU IMMUNIZE ORDER/ADMIN: HCPCS | Performed by: FAMILY MEDICINE

## 2022-01-07 PROCEDURE — 99214 OFFICE O/P EST MOD 30 MIN: CPT | Performed by: FAMILY MEDICINE

## 2022-01-07 PROCEDURE — G8427 DOCREV CUR MEDS BY ELIG CLIN: HCPCS | Performed by: FAMILY MEDICINE

## 2022-01-07 PROCEDURE — 4004F PT TOBACCO SCREEN RCVD TLK: CPT | Performed by: FAMILY MEDICINE

## 2022-01-07 PROCEDURE — 3017F COLORECTAL CA SCREEN DOC REV: CPT | Performed by: FAMILY MEDICINE

## 2022-01-07 PROCEDURE — 90732 PPSV23 VACC 2 YRS+ SUBQ/IM: CPT | Performed by: FAMILY MEDICINE

## 2022-01-07 PROCEDURE — 90472 IMMUNIZATION ADMIN EACH ADD: CPT | Performed by: FAMILY MEDICINE

## 2022-01-07 PROCEDURE — 90471 IMMUNIZATION ADMIN: CPT | Performed by: FAMILY MEDICINE

## 2022-01-07 ASSESSMENT — PATIENT HEALTH QUESTIONNAIRE - PHQ9
9. THOUGHTS THAT YOU WOULD BE BETTER OFF DEAD, OR OF HURTING YOURSELF: 0
6. FEELING BAD ABOUT YOURSELF - OR THAT YOU ARE A FAILURE OR HAVE LET YOURSELF OR YOUR FAMILY DOWN: 0
SUM OF ALL RESPONSES TO PHQ9 QUESTIONS 1 & 2: 0
7. TROUBLE CONCENTRATING ON THINGS, SUCH AS READING THE NEWSPAPER OR WATCHING TELEVISION: 0
SUM OF ALL RESPONSES TO PHQ QUESTIONS 1-9: 0
SUM OF ALL RESPONSES TO PHQ QUESTIONS 1-9: 0
10. IF YOU CHECKED OFF ANY PROBLEMS, HOW DIFFICULT HAVE THESE PROBLEMS MADE IT FOR YOU TO DO YOUR WORK, TAKE CARE OF THINGS AT HOME, OR GET ALONG WITH OTHER PEOPLE: 0
2. FEELING DOWN, DEPRESSED OR HOPELESS: 0
8. MOVING OR SPEAKING SO SLOWLY THAT OTHER PEOPLE COULD HAVE NOTICED. OR THE OPPOSITE, BEING SO FIGETY OR RESTLESS THAT YOU HAVE BEEN MOVING AROUND A LOT MORE THAN USUAL: 0
5. POOR APPETITE OR OVEREATING: 0
SUM OF ALL RESPONSES TO PHQ QUESTIONS 1-9: 0
4. FEELING TIRED OR HAVING LITTLE ENERGY: 0
SUM OF ALL RESPONSES TO PHQ QUESTIONS 1-9: 0
3. TROUBLE FALLING OR STAYING ASLEEP: 0
1. LITTLE INTEREST OR PLEASURE IN DOING THINGS: 0

## 2022-01-07 NOTE — PROGRESS NOTES
Surgery, [place]    Vascular problem  -     Radha Armas MD, Vascular Surgery, [place]    Neuropathy / Rheum workup has not been revealing for source of chronic foot and hand pain. Will try vascular workup and she would like to consult with vascular. Essential hypertension  BP controlled on our readings today. Can compare home cuff to ours if she would like for accuracy purposes. Chronic pancreatitis, unspecified pancreatitis type ([address]  Has been doing well since greatly decreasing alcohol consumption and watching her diet. Monitoring. Other orders  -     PNEUMOVAX 23 subcutaneous/IM (Pneumococcal polysaccharide vaccine 23-valent >= 1yo)  -     INFLUENZA, MDCK QUADV, 2 YRS AND OLDER, IM, PF, PREFILL SYR OR SDV, 0.5ML (FLUCELVAX QUADV, PF)    Recommended covid vaccine . Encouraged to turn in cologuard test.     Return in about 6 months (around 7/7/2022). or as scheduled. Patient counseled to follow up sooner or seek more acute care if symptoms worsening or not improving according to plan. Electronically signed by Jocelin De Luna MD on 1/7/2022  Please note that >30 minutes was spent on patient care, >50% face-to-face with patient, including gathering history, performing physical exam, discussing findings, counseling patient, determining plan forward, documenting the encounter and coordinating patient care. All questions addressed and answered.      _________________________________________________________  Current Outpatient Medications on File Prior to Visit   Medication Sig Dispense Refill    COLLAGEN PO Take by mouth      FLUoxetine (PROZAC) 20 MG capsule take 1 capsule by mouth every morning and 2 capsules every evening 90 capsule 5    busPIRone (BUSPAR) 10 MG tablet take 1 tablet by mouth twice a day MAY INCREASE TO three times a day DURING MORE ANXIOUS EPISODES 90 tablet 1    amLODIPine-benazepril (LOTREL) 5-20 MG per capsule take 1 capsule by mouth once daily 30 capsule 5    omeprazole (PRILOSEC) 20 MG delayed release capsule take 1 capsule by mouth once daily 30 capsule 5    triamcinolone (KENALOG) 0.1 % cream Apply twice a day to active rash on hands and elbow.  hydrocortisone 2.5 % ointment apply to affected area ON face twice a day (DO NOT PUT INSIDE EYES)      aspirin 81 MG EC tablet Take 81 mg by mouth daily      hydrocortisone 2.5 % cream Apply twice a day to active rash on face.  cyclobenzaprine (FLEXERIL) 10 MG tablet Take 1 tablet by mouth nightly as needed for Muscle spasms 30 tablet 5    Multiple Vitamins-Minerals (CENTRA-JACOBY PO) Take by mouth daily      Lifitegrast (XIIDRA) 5 % SOLN Apply to eye 2 times daily       hydroxychloroquine (PLAQUENIL) 200 MG tablet Take 400 mg by mouth daily       Omega-3 Fatty Acids (FISH OIL) 1000 MG CAPS Take 3,000 mg by mouth daily       hypromellose (ARTIFICIAL TEARS) 0.4 % SOLN ophthalmic solution Place 1 drop into both eyes every 4 hours as needed (dry eyes) 15 mL 0    vitamin D (CHOLECALCIFEROL) 1000 UNIT TABS tablet Take 1,000 Units by mouth every morning       Multiple Vitamins-Minerals (HAIR/SKIN/NAILS PO) Take 1 tablet by mouth every morning       B Complex Vitamins (VITAMIN B COMPLEX PO) Take 1 tablet by mouth every morning       CALCIUM-VITAMIN D PO Take 1 tablet by mouth every morning        No current facility-administered medications on file prior to visit. Patient Active Problem List   Diagnosis Code    Anxiety F41.9    HTN (hypertension) I10    Raynaud's phenomenon I73.00    Chronic daily headache R51.9    Leukocytosis D72.829    Cigarette smoker F17.210    Depression F32. A    Elevated LFTs R79.89    Peripheral neuropathy G62.9    Hx of Raynaud's syndrome Z86.79    Tobacco use Z72.0    Chronic pancreatitis, unspecified pancreatitis type ([facility] K86.1     _________________________________________________________  Past Medical History:   Diagnosis Date    Anxiety     Depression     Herniated disc     Hx of Raynaud's syndrome 1/25/2021    Hypertension     Peripheral neuropathy 1/25/2021    Pneumonia     Raynaud's disease     TMJ (dislocation of temporomandibular joint)        Family History   Problem Relation Age of Onset    Heart Disease Father         CHF       Past Surgical History:   Procedure Laterality Date    HYSTERECTOMY      OVARIAN CYST REMOVAL      benign mass removal    SKIN BIOPSY Left     left hand       Social History     Tobacco Use    Smoking status: Current Every Day Smoker     Packs/day: 0.25     Years: 30.00     Pack years: 7.50     Types: Cigarettes    Smokeless tobacco: Never Used   Vaping Use    Vaping Use: Never used   Substance Use Topics    Alcohol use: Yes     Comment: occasional    Drug use: No       Chart reviewed and updated where appropriate for PMH, Fam, and Soc Hx.  _________________________________________________________  ROS: POSITIVE: As in the HPI. Otherwise Pertinent negatives are negative.    __________________________________________________________  Physical Exam   Constitutional:    She is oriented to person, place, and time. She appears well-developed and well-nourished. Eyes:    Conjunctivae are normal.    Pupils are equal, round, and reactive to light. EOMI. Neck:    Normal range of motion. No thyromegaly or nodules noted. No bruit. No LAD. Cardiovascular:    Normal rate, regular rhythm and normal heart sounds. 3/6 sys murmur. No gallop and no friction rub. Pulmonary/Chest:    Effort normal and breath sounds normal.    No wheezes. No rales or rhonchi. Abdominal:    Soft. Bowel sounds are normal.    No distension. No tenderness. Skin:    Skin is warm and dry. No rashes, No lesions. Psychiatric:    She has a normal mood and affect. Normal groom and dress. No SI or HI.   ________________________________________________________    This note may have been created using dictation software. Efforts were made to reduce errors, but some may persist.

## 2022-01-27 RX ORDER — OMEPRAZOLE 20 MG/1
CAPSULE, DELAYED RELEASE ORAL
Qty: 90 CAPSULE | Refills: 1 | Status: SHIPPED
Start: 2022-01-27 | End: 2022-07-07 | Stop reason: SDUPTHER

## 2022-01-27 NOTE — TELEPHONE ENCOUNTER
Last Appointment:  1/7/2022  Future Appointments   Date Time Provider Yanet Quiroz   7/7/2022  2:00 PM Taylor Reynolds  [address]

## 2022-02-06 DIAGNOSIS — F41.9 ANXIETY: ICD-10-CM

## 2022-02-07 RX ORDER — BUSPIRONE HYDROCHLORIDE 10 MG/1
TABLET ORAL
Qty: 90 TABLET | Refills: 1 | Status: SHIPPED
Start: 2022-02-07 | End: 2022-06-06 | Stop reason: SDUPTHER

## 2022-02-07 NOTE — TELEPHONE ENCOUNTER
Last Appointment:  1/7/2022  Future Appointments   Date Time Provider Yanet Quiroz   3/15/2022  8:00 AM [facility]   7/7/2022  2:00 PM Hussein Ruby  [address] [place] [address]

## 2022-02-10 ENCOUNTER — TELEPHONE (OUTPATIENT)
Dept: VASCULAR SURGERY | Age: 52
End: 2022-02-10

## 2022-02-10 NOTE — TELEPHONE ENCOUNTER
Second Attempt: Received referral from Dr. Anusha West for Dr. Carrie Quinones regarding bilateral foot pain, left message for patient to return call.

## 2022-03-02 ENCOUNTER — TELEPHONE (OUTPATIENT)
Dept: VASCULAR SURGERY | Age: 52
End: 2022-03-02

## 2022-03-02 NOTE — TELEPHONE ENCOUNTER
Final Attempt: Received referral from Dr. Margaret Child for Dr. Berny Calle regarding bilateral foot pain, left message for patient to return call. Will send back to Margaret Hernandez.

## 2022-03-03 NOTE — TELEPHONE ENCOUNTER
Okay to send letter and close. She can reach out to our office if she still wants this consultation.

## 2022-03-15 ENCOUNTER — HOSPITAL ENCOUNTER (OUTPATIENT)
Dept: INTERVENTIONAL RADIOLOGY/VASCULAR | Age: 52
Discharge: HOME | End: 2022-03-17
Payer: MEDICAID

## 2022-03-15 DIAGNOSIS — M79.672 BILATERAL FOOT PAIN: ICD-10-CM

## 2022-03-15 DIAGNOSIS — M79.671 BILATERAL FOOT PAIN: ICD-10-CM

## 2022-03-15 PROCEDURE — 93922 UPR/L XTREMITY ART 2 LEVELS: CPT

## 2022-04-02 DIAGNOSIS — F41.9 ANXIETY: ICD-10-CM

## 2022-04-04 RX ORDER — BUSPIRONE HYDROCHLORIDE 10 MG/1
TABLET ORAL
Qty: 90 TABLET | Refills: 1 | OUTPATIENT
Start: 2022-04-04

## 2022-05-20 RX ORDER — AMLODIPINE BESYLATE AND BENAZEPRIL HYDROCHLORIDE 5; 20 MG/1; MG/1
CAPSULE ORAL
Qty: 30 CAPSULE | Refills: 5 | Status: SHIPPED | OUTPATIENT
Start: 2022-05-20

## 2022-05-20 NOTE — TELEPHONE ENCOUNTER
Last Appointment:  1/7/2022  Future Appointments   Date Time Provider Yanet Quiroz   7/7/2022  2:00 PM Nisreen Casey  [address] [place] [address]

## 2022-06-06 DIAGNOSIS — F41.9 ANXIETY: ICD-10-CM

## 2022-06-06 RX ORDER — BUSPIRONE HYDROCHLORIDE 10 MG/1
TABLET ORAL
Qty: 90 TABLET | Refills: 1 | OUTPATIENT
Start: 2022-06-06

## 2022-06-07 RX ORDER — BUSPIRONE HYDROCHLORIDE 10 MG/1
TABLET ORAL
Qty: 90 TABLET | Refills: 5 | Status: SHIPPED | OUTPATIENT
Start: 2022-06-07

## 2022-06-07 NOTE — TELEPHONE ENCOUNTER
Last Appointment:  1/7/2022  Future Appointments   Date Time Provider Yanet Quiroz   7/7/2022  2:00 PM Norbert Wahl  [address]

## 2022-07-07 ENCOUNTER — OFFICE VISIT (OUTPATIENT)
Dept: FAMILY MEDICINE CLINIC | Age: 52
End: 2022-07-07
Payer: MEDICAID

## 2022-07-07 VITALS
DIASTOLIC BLOOD PRESSURE: 80 MMHG | SYSTOLIC BLOOD PRESSURE: 124 MMHG | HEIGHT: 65 IN | OXYGEN SATURATION: 99 % | BODY MASS INDEX: 24.66 KG/M2 | HEART RATE: 93 BPM | TEMPERATURE: 97.6 F | WEIGHT: 148 LBS

## 2022-07-07 DIAGNOSIS — Z12.31 ENCOUNTER FOR MAMMOGRAM TO ESTABLISH BASELINE MAMMOGRAM: ICD-10-CM

## 2022-07-07 DIAGNOSIS — K21.9 GASTROESOPHAGEAL REFLUX DISEASE, UNSPECIFIED WHETHER ESOPHAGITIS PRESENT: ICD-10-CM

## 2022-07-07 DIAGNOSIS — I10 ESSENTIAL HYPERTENSION: ICD-10-CM

## 2022-07-07 DIAGNOSIS — R53.82 CHRONIC FATIGUE: ICD-10-CM

## 2022-07-07 DIAGNOSIS — M35.00 SICCA SYNDROME (HCC): Primary | ICD-10-CM

## 2022-07-07 DIAGNOSIS — E55.9 VITAMIN D DEFICIENCY: ICD-10-CM

## 2022-07-07 DIAGNOSIS — F41.9 ANXIETY: ICD-10-CM

## 2022-07-07 LAB
ALBUMIN SERPL-MCNC: 4.3 G/DL (ref 3.5–5.2)
ALP BLD-CCNC: 77 U/L (ref 35–104)
ALT SERPL-CCNC: 16 U/L (ref 0–32)
ANION GAP SERPL CALCULATED.3IONS-SCNC: 17 MMOL/L (ref 7–16)
AST SERPL-CCNC: 25 U/L (ref 0–31)
BASOPHILS ABSOLUTE: 0.07 E9/L (ref 0–0.2)
BASOPHILS RELATIVE PERCENT: 1.2 % (ref 0–2)
BILIRUB SERPL-MCNC: 0.2 MG/DL (ref 0–1.2)
BUN BLDV-MCNC: 6 MG/DL (ref 6–20)
C-REACTIVE PROTEIN: 0.3 MG/DL (ref 0–0.4)
CALCIUM SERPL-MCNC: 9 MG/DL (ref 8.6–10.2)
CHLORIDE BLD-SCNC: 98 MMOL/L (ref 98–107)
CHOLESTEROL, TOTAL: 217 MG/DL (ref 0–199)
CO2: 19 MMOL/L (ref 22–29)
CREAT SERPL-MCNC: 0.7 MG/DL (ref 0.5–1)
EOSINOPHILS ABSOLUTE: 0.24 E9/L (ref 0.05–0.5)
EOSINOPHILS RELATIVE PERCENT: 4.1 % (ref 0–6)
FOLATE: >20 NG/ML (ref 4.8–24.2)
GFR AFRICAN AMERICAN: >60
GFR NON-AFRICAN AMERICAN: >60 ML/MIN/1.73
GLUCOSE BLD-MCNC: 70 MG/DL (ref 74–99)
HCT VFR BLD CALC: 40.4 % (ref 34–48)
HDLC SERPL-MCNC: 43 MG/DL
HEMOGLOBIN: 13.2 G/DL (ref 11.5–15.5)
IMMATURE GRANULOCYTES #: 0.01 E9/L
IMMATURE GRANULOCYTES %: 0.2 % (ref 0–5)
LDL CHOLESTEROL CALCULATED: 147 MG/DL (ref 0–99)
LYMPHOCYTES ABSOLUTE: 2.19 E9/L (ref 1.5–4)
LYMPHOCYTES RELATIVE PERCENT: 37.1 % (ref 20–42)
MCH RBC QN AUTO: 31.6 PG (ref 26–35)
MCHC RBC AUTO-ENTMCNC: 32.7 % (ref 32–34.5)
MCV RBC AUTO: 96.7 FL (ref 80–99.9)
MONOCYTES ABSOLUTE: 0.53 E9/L (ref 0.1–0.95)
MONOCYTES RELATIVE PERCENT: 9 % (ref 2–12)
NEUTROPHILS ABSOLUTE: 2.87 E9/L (ref 1.8–7.3)
NEUTROPHILS RELATIVE PERCENT: 48.4 % (ref 43–80)
PDW BLD-RTO: 12.6 FL (ref 11.5–15)
PLATELET # BLD: 263 E9/L (ref 130–450)
PMV BLD AUTO: 10 FL (ref 7–12)
POTASSIUM SERPL-SCNC: 3.9 MMOL/L (ref 3.5–5)
RBC # BLD: 4.18 E12/L (ref 3.5–5.5)
SODIUM BLD-SCNC: 134 MMOL/L (ref 132–146)
TOTAL PROTEIN: 7.1 G/DL (ref 6.4–8.3)
TRIGL SERPL-MCNC: 137 MG/DL (ref 0–149)
TSH SERPL DL<=0.05 MIU/L-ACNC: 2.09 UIU/ML (ref 0.27–4.2)
VITAMIN B-12: 837 PG/ML (ref 211–946)
VITAMIN D 25-HYDROXY: 56 NG/ML (ref 30–100)
VLDLC SERPL CALC-MCNC: 27 MG/DL
WBC # BLD: 5.9 E9/L (ref 4.5–11.5)

## 2022-07-07 PROCEDURE — G8427 DOCREV CUR MEDS BY ELIG CLIN: HCPCS | Performed by: FAMILY MEDICINE

## 2022-07-07 PROCEDURE — 4004F PT TOBACCO SCREEN RCVD TLK: CPT | Performed by: FAMILY MEDICINE

## 2022-07-07 PROCEDURE — 99214 OFFICE O/P EST MOD 30 MIN: CPT | Performed by: FAMILY MEDICINE

## 2022-07-07 PROCEDURE — G8420 CALC BMI NORM PARAMETERS: HCPCS | Performed by: FAMILY MEDICINE

## 2022-07-07 PROCEDURE — 3017F COLORECTAL CA SCREEN DOC REV: CPT | Performed by: FAMILY MEDICINE

## 2022-07-07 RX ORDER — OMEPRAZOLE 20 MG/1
CAPSULE, DELAYED RELEASE ORAL
Qty: 90 CAPSULE | Refills: 1 | Status: SHIPPED | OUTPATIENT
Start: 2022-07-07

## 2022-07-07 NOTE — PROGRESS NOTES
[facility]  Office Progress Note - Dr. Shirley Prasad  7/7/22    CC:   Chief Complaint   Patient presents with    Hypertension        /80 (Site: Left Upper Arm, Position: Sitting, Cuff Size: Medium Adult)   Pulse 93   Temp 97.6 °F (36.4 °C) (Temporal)   Ht 5' 5\" (1.651 m)   Wt 148 lb (67.1 kg)   SpO2 99%   BMI 24.63 kg/m²   Wt Readings from Last 3 Encounters:   07/07/22 148 lb (67.1 kg)   01/07/22 148 lb (67.1 kg)   07/08/21 149 lb (67.6 kg)       HPI: Patient presents for 6-month follow-up. Overall she has been feeling steady but still with her chronic medical complaints which have not found definitive diagnoses. Hypertension  Follow-up  Blood pressure is controlled today. BP Readings from Last 3 Encounters:   07/07/22 124/80   01/07/22 130/80   07/08/21 (!) 140/86     Patient continues medications regularly. Compliance is good. Denies CP, sob, abd pain, headaches, vision changes, dizziness, hypotensive symptoms. No side effects from medications noted. Her chronic fatigue and leg pains, hand and arm pains have continued to persist.   Comes and goes without much relation to anything. If she overexerts she'll usually have to recover for a few days. Feeling tired today. No new joint swelling etc.     Omeprazole \"keeps [gerd] at bay\" when I take it regularly    Verenice had any pancreatitis flares. She knows when to cut back on diet/change diet, if feels like maybe flaring up. But hasnt had any episodes. .  Had a thorough discussion of cancer screening theory. She is hesitant to do colon cancer screening because of some believes that she holds about inevitability with cancer. She is not sure that she would choose to treat it. She is somewhat agreeable to get a mammogram.    She continues to have problems with dry mouth/sicca syndrome. No definitive rheumatologic problem has been found.   He may have chronic fatigue syndrome. _________________________________________________________    Assessment / Christy Montiel was seen today for hypertension. Diagnoses and all orders for this visit:    Sicca syndrome ([address]  Stable. Not better, not worse. Anxiety  Stable. Continue Prozac and BuSpar at current doses. Essential hypertension  -     CBC with Auto Differential; Future  -     Comprehensive Metabolic Panel; Future  -     Lipid Panel; Future  Blood pressure well controlled. Continue current medication regimen for stability unchanged. Vitamin D deficiency  -     Vitamin D 25 Hydroxy; Future  Update labs. Chronic fatigue  -     TSH; Future  -     Vitamin B12 & Folate; Future  -     Sedimentation Rate; Future  -     C-Reactive Protein; Future  -     Vitamin D 25 Hydroxy; Future  Update labs for fatigue. Suspect she may have chronic fatigue syndrome as a source of many of her symptoms. Encounter for mammogram to establish baseline mammogram  -     [facility] DIGITAL SCREEN BILATERAL; Future    GERD  -     omeprazole (PRILOSEC) 20 MG delayed release capsule; take 1 capsule by mouth once daily      Return in about 6 months (around 1/7/2023). or as scheduled. Patient counseled to follow up sooner or seek more acute care if symptoms worsening or not improving according to plan. Electronically signed by Guerda Villanueva MD on 7/7/2022    _________________________________________________________  Current Outpatient Medications on File Prior to Visit   Medication Sig Dispense Refill    busPIRone (BUSPAR) 10 MG tablet take 1 tablet by mouth twice a day. May increase to three times a day during more anxious episodes.  90 tablet 5    amLODIPine-benazepril (LOTREL) 5-20 MG per capsule take 1 capsule by mouth once daily 30 capsule 5    COLLAGEN PO Take by mouth      FLUoxetine (PROZAC) 20 MG capsule take 1 capsule by mouth every morning and 2 capsules every evening 90 capsule 5    triamcinolone (KENALOG) 0.1 % cream Apply twice a day to active rash on hands and elbow.  hydrocortisone 2.5 % ointment apply to affected area ON face twice a day (DO NOT PUT INSIDE EYES)      aspirin 81 MG EC tablet Take 81 mg by mouth daily      hydrocortisone 2.5 % cream Apply twice a day to active rash on face.  cyclobenzaprine (FLEXERIL) 10 MG tablet Take 1 tablet by mouth nightly as needed for Muscle spasms 30 tablet 5    Multiple Vitamins-Minerals (CENTRA-JACOBY PO) Take by mouth daily      Lifitegrast (XIIDRA) 5 % SOLN Apply to eye 2 times daily       hydroxychloroquine (PLAQUENIL) 200 MG tablet Take 400 mg by mouth daily       Omega-3 Fatty Acids (FISH OIL) 1000 MG CAPS Take 3,000 mg by mouth daily       hypromellose (ARTIFICIAL TEARS) 0.4 % SOLN ophthalmic solution Place 1 drop into both eyes every 4 hours as needed (dry eyes) 15 mL 0    vitamin D (CHOLECALCIFEROL) 1000 UNIT TABS tablet Take 1,000 Units by mouth every morning       Multiple Vitamins-Minerals (HAIR/SKIN/NAILS PO) Take 1 tablet by mouth every morning       B Complex Vitamins (VITAMIN B COMPLEX PO) Take 1 tablet by mouth every morning       CALCIUM-VITAMIN D PO Take 1 tablet by mouth every morning        No current facility-administered medications on file prior to visit. Patient Active Problem List   Diagnosis Code    Anxiety F41.9    HTN (hypertension) I10    Raynaud's phenomenon I73.00    Chronic daily headache R51.9    Leukocytosis D72.829    Cigarette smoker F17.210    Depression F32. A    Elevated LFTs R79.89    Peripheral neuropathy G62.9    Hx of Raynaud's syndrome Z86.79    Tobacco use Z72.0    Chronic pancreatitis, unspecified pancreatitis type (HCC) K86.1    Sicca syndrome ([facility] [address] M35.00     _________________________________________________________  Past Medical History:   Diagnosis Date    Anxiety     Depression     Herniated disc     Hx of Raynaud's syndrome 1/25/2021    Hypertension     Peripheral neuropathy 1/25/2021    Pneumonia     Raynaud's disease     TMJ (dislocation of temporomandibular joint)        Family History   Problem Relation Age of Onset    Heart Disease Father         CHF       Past Surgical History:   Procedure Laterality Date    HYSTERECTOMY (CERVIX STATUS UNKNOWN)      OVARIAN CYST REMOVAL      benign mass removal    SKIN BIOPSY Left     left hand       Social History     Tobacco Use    Smoking status: Current Every Day Smoker     Packs/day: 0.25     Years: 30.00     Pack years: 7.50     Types: Cigarettes    Smokeless tobacco: Never Used   Vaping Use    Vaping Use: Never used   Substance Use Topics    Alcohol use: Yes     Comment: occasional    Drug use: No       Chart reviewed and updated where appropriate for PMH, Fam, and Soc Hx.  _________________________________________________________  ROS: POSITIVE: As in the HPI. Otherwise Pertinent negatives are negative.    __________________________________________________________  Physical Exam   Constitutional:    She is oriented to person, place, and time. She appears well-developed and well-nourished. Eyes:    Conjunctivae are normal.    Pupils are equal, round, and reactive to light. EOMI. Neck:    Normal range of motion. No thyromegaly or nodules noted. No bruit. No LAD. Cardiovascular:    Normal rate, regular rhythm and normal heart sounds. 2 out of 6 systolic right upper sternal border murmur. No gallop and no friction rub. Pulmonary/Chest:    Effort normal and breath sounds normal.    No wheezes. No rales or rhonchi. Abdominal:    Soft. Bowel sounds are normal.    No distension. No tenderness. Musculoskeletal:    Normal range of motion. No joint swelling noted. No peripheral edema. Uniformly erythematous distal lower extremities and feet, stable  Skin:    Skin is warm and dry. No rashes, No lesions. Psychiatric:    She has a normal mood and affect. Normal groom and dress. No SI or HI. ________________________________________________________    This note may have been created using dictation software.  Efforts were made to reduce errors, but some may persist.

## 2022-07-08 DIAGNOSIS — F41.9 ANXIETY: ICD-10-CM

## 2022-07-08 LAB — SEDIMENTATION RATE, ERYTHROCYTE: 0 MM/HR (ref 0–20)

## 2022-07-08 RX ORDER — FLUOXETINE HYDROCHLORIDE 20 MG/1
CAPSULE ORAL
Qty: 90 CAPSULE | Refills: 5 | Status: SHIPPED | OUTPATIENT
Start: 2022-07-08

## 2022-12-05 RX ORDER — AMLODIPINE BESYLATE AND BENAZEPRIL HYDROCHLORIDE 5; 20 MG/1; MG/1
CAPSULE ORAL
Qty: 90 CAPSULE | Refills: 1 | Status: SHIPPED | OUTPATIENT
Start: 2022-12-05

## 2022-12-05 NOTE — TELEPHONE ENCOUNTER
Last Appointment:  7/7/2022  Future Appointments   Date Time Provider Yanet Quiroz   1/9/2023  2:20 PM Mariana Quinn  [address] [place] [address]

## 2022-12-10 DIAGNOSIS — F41.9 ANXIETY: ICD-10-CM

## 2022-12-15 RX ORDER — BUSPIRONE HYDROCHLORIDE 10 MG/1
TABLET ORAL
Qty: 90 TABLET | Refills: 5 | Status: SHIPPED | OUTPATIENT
Start: 2022-12-15

## 2022-12-15 NOTE — TELEPHONE ENCOUNTER
Last Appointment:  7/7/2022  Future Appointments   Date Time Provider Yanet Quiroz   1/9/2023  2:20 PM Mindy Caballero  [address]

## 2023-01-03 RX ORDER — OMEPRAZOLE 20 MG/1
CAPSULE, DELAYED RELEASE ORAL
Qty: 90 CAPSULE | Refills: 1 | Status: SHIPPED | OUTPATIENT
Start: 2023-01-03

## 2023-01-11 DIAGNOSIS — F41.9 ANXIETY: ICD-10-CM

## 2023-01-11 RX ORDER — FLUOXETINE HYDROCHLORIDE 20 MG/1
CAPSULE ORAL
Qty: 90 CAPSULE | Refills: 5 | Status: SHIPPED | OUTPATIENT
Start: 2023-01-11

## 2023-01-19 ENCOUNTER — OFFICE VISIT (OUTPATIENT)
Dept: FAMILY MEDICINE CLINIC | Age: 53
End: 2023-01-19
Payer: MEDICAID

## 2023-01-19 VITALS
HEART RATE: 69 BPM | TEMPERATURE: 97.8 F | RESPIRATION RATE: 18 BRPM | BODY MASS INDEX: 24.63 KG/M2 | HEIGHT: 65 IN | SYSTOLIC BLOOD PRESSURE: 128 MMHG | DIASTOLIC BLOOD PRESSURE: 74 MMHG | OXYGEN SATURATION: 100 %

## 2023-01-19 DIAGNOSIS — M54.41 ACUTE MIDLINE LOW BACK PAIN WITH BILATERAL SCIATICA: Primary | ICD-10-CM

## 2023-01-19 DIAGNOSIS — M54.42 ACUTE MIDLINE LOW BACK PAIN WITH BILATERAL SCIATICA: Primary | ICD-10-CM

## 2023-01-19 DIAGNOSIS — M54.6 ACUTE MIDLINE THORACIC BACK PAIN: ICD-10-CM

## 2023-01-19 DIAGNOSIS — S22.000A COMPRESSION FRACTURE OF THORACIC VERTEBRA, UNSPECIFIED THORACIC VERTEBRAL LEVEL, INITIAL ENCOUNTER (HCC): Primary | ICD-10-CM

## 2023-01-19 PROCEDURE — G8484 FLU IMMUNIZE NO ADMIN: HCPCS | Performed by: PHYSICIAN ASSISTANT

## 2023-01-19 PROCEDURE — G8420 CALC BMI NORM PARAMETERS: HCPCS | Performed by: PHYSICIAN ASSISTANT

## 2023-01-19 PROCEDURE — G8427 DOCREV CUR MEDS BY ELIG CLIN: HCPCS | Performed by: PHYSICIAN ASSISTANT

## 2023-01-19 PROCEDURE — 3017F COLORECTAL CA SCREEN DOC REV: CPT | Performed by: PHYSICIAN ASSISTANT

## 2023-01-19 PROCEDURE — 3078F DIAST BP <80 MM HG: CPT | Performed by: PHYSICIAN ASSISTANT

## 2023-01-19 PROCEDURE — 4004F PT TOBACCO SCREEN RCVD TLK: CPT | Performed by: PHYSICIAN ASSISTANT

## 2023-01-19 PROCEDURE — 99214 OFFICE O/P EST MOD 30 MIN: CPT | Performed by: PHYSICIAN ASSISTANT

## 2023-01-19 PROCEDURE — 3074F SYST BP LT 130 MM HG: CPT | Performed by: PHYSICIAN ASSISTANT

## 2023-01-19 RX ORDER — CYCLOBENZAPRINE HCL 10 MG
10 TABLET ORAL 3 TIMES DAILY PRN
Qty: 15 TABLET | Refills: 0 | Status: SHIPPED | OUTPATIENT
Start: 2023-01-19 | End: 2023-01-29

## 2023-01-19 RX ORDER — METHYLPREDNISOLONE 4 MG/1
TABLET ORAL
Qty: 1 KIT | Refills: 0 | Status: SHIPPED
Start: 2023-01-19 | End: 2023-01-19

## 2023-01-19 RX ORDER — KETOROLAC TROMETHAMINE 30 MG/ML
60 INJECTION, SOLUTION INTRAMUSCULAR; INTRAVENOUS ONCE
Status: COMPLETED | OUTPATIENT
Start: 2023-01-19 | End: 2023-01-19

## 2023-01-19 RX ADMIN — KETOROLAC TROMETHAMINE 60 MG: 30 INJECTION, SOLUTION INTRAMUSCULAR; INTRAVENOUS at 11:50

## 2023-01-19 NOTE — PROGRESS NOTES
Chief Complaint   Back Pain      History of Present Illness   Source of history provided by:  patient. Tanya Montalvo is a 47 y.o. old female presenting to the walk in clinic for evaluation of middle to low back pain which has been present for the past week. Patient states that she was at work helping to carry a heavy gumball machine when her pain began. She denies any obvious injury at the time. States that her symptoms have been progressive. Pt states the pain is worse with movement (especially bending) and improves with lying flat. There is occasional radiation of the pain into the bilateral buttocks/legs. Patient has had a history of back problems in the past and previous herniated disks. Pt denies any bowel/bladder incontinence, abdominal pain, hematuria, N/V/D, fever, chills, HA, neck pain, recent illness, dysuria, or lethargy. ROS    Unless otherwise stated in this report or unable to obtain because of the patient's clinical or mental status as evidenced by the medical record, this patients's positive and negative responses for Review of Systems, constitutional, psych, eyes, ENT, cardiovascular, respiratory, gastrointestinal, neurological, genitourinary, musculoskeletal, integument systems and systems related to the presenting problem are either stated in the preceding or were not pertinent or were negative for the symptoms and/or complaints related to the medical problem. Past Medical History:  has a past medical history of Anxiety, Depression, Herniated disc, Hx of Raynaud's syndrome, Hypertension, Peripheral neuropathy, Pneumonia, Raynaud's disease, and TMJ (dislocation of temporomandibular joint). Past Surgical History:  has a past surgical history that includes ovarian cyst removal; Hysterectomy; and skin biopsy (Left). Social History:  reports that she has been smoking cigarettes. She has a 7.50 pack-year smoking history.  She has never used smokeless tobacco. She reports current alcohol use. She reports that she does not use drugs. Family History: family history includes Heart Disease in her father. Allergies: Penicillins, Bactrim [sulfamethoxazole-trimethoprim], Levaquin [levofloxacin], and Macrobid [nitrofurantoin]    Physical Exam         VS:  /74   Pulse 69   Temp 97.8 °F (36.6 °C)   Resp 18   Ht 5' 5\" (1.651 m)   SpO2 100%   BMI 24.63 kg/m²    Oxygen Saturation Interpretation: Normal.    Constitutional:  Alert, development consistent with age. Neck:  Normal ROM. Supple. No TTP. Lungs:  Clear to auscultation and breath sounds equal.  Heart:  Regular rate and rhythm, normal heart sounds, without pathological murmurs, ectopy, gallops, or rubs. Abdomen:  Soft, nontender, good bowel sounds. No firm or pulsatile mass. Back: Tenderness: Mild TTP noted over the lower thoracic and upper lumbar spine. No step-off or gross deformity noted. There is also associated tenderness of the paravertebral musculature at the same area. Swelling: No edema. Range of Motion: Decreased lateral and front to back ROM due to pain. CVA Tenderness: No CVA tenderness bilaterally. Straight leg raising: Negative straight leg raise. Skin:  No bruising, redness, abrasions, or rashes. Distal Function:              Motor deficit: Strength 5/5 in LE's bilaterally. Sensory deficit: Distal sensation intact. Pulse deficit: Distal pulses 2+ and bounding. Calf Tenderness:  No bilateral calf tenderness. Negative Brian's sign bilaterally               Reflexes: Intact at knee and achilles bilaterally. Gait:  No antalgia noted. Skin:  Normal turgor. Warm, dry, without visible rash. Neurological:  Alert and oriented. Motor functions intact.     Lab / Imaging Results   (All laboratory and radiology results have been personally reviewed by myself)  Labs:  No results found for this visit on 01/19/23. Imaging: All Radiology results interpreted by Radiologist unless otherwise noted. Assessment / Plan     Impression(s):  Alicia Melo was seen today for back pain. Diagnoses and all orders for this visit:    Acute midline low back pain with bilateral sciatica  -     XR LUMBAR SPINE (2-3 VIEWS); Future  -     ketorolac (TORADOL) injection 60 mg  -     methylPREDNISolone (MEDROL DOSEPACK) 4 MG tablet; Take by mouth. -     cyclobenzaprine (FLEXERIL) 10 MG tablet; Take 1 tablet by mouth 3 times daily as needed for Muscle spasms (do not work or drive after taking d/t sedation)    Acute midline thoracic back pain  -     XR THORACIC SPINE (3 VIEWS); Future  -     ketorolac (TORADOL) injection 60 mg  -     methylPREDNISolone (MEDROL DOSEPACK) 4 MG tablet; Take by mouth. -     cyclobenzaprine (FLEXERIL) 10 MG tablet; Take 1 tablet by mouth 3 times daily as needed for Muscle spasms (do not work or drive after taking d/t sedation)      Disposition:  Disposition: Discharge to home. Toradol injection administered in office today, potential reactions discussed. X-rays of the thoracic and lumbar spine also ordered in office today to rule out the presence of a compression fracture, will call with results once available. Scripts written for a Medrol dose pack and as needed Flexeril, side effects discussed. Avoid working/driving after taking muscle relaxers due to sedation. Advise rest, ice, and/or moist heat for additional relief. PCP in 1-2 weeks if symptoms persist. ED sooner if symptoms worsen or change. ED immediately with any fever, severe or worsening back pain, paresthesias, weakness, or GI/ incontinence. Pt states understanding and is in agreement with this care plan. All questions answered. Juana Valiente PA-C    **This report was transcribed using voice recognition software. Every effort was made to ensure accuracy; however, inadvertent computerized transcription errors may be present.     Addendum: X-rays confirmed the presence of compression fractures at T12, L1, and L3, chronicity unspecified.  Results relayed to both patient and PCP (Dr. Ba).  PCP plans to order a CT scan and refer patient to neurosurgery for further evaluation.  Patient was advised.  Advised to avoid taking steroids to prevent delayed healing. Recommend Tylenol and/or ibuprofen for alleviation of pain.  Patient states understanding and is in agreement with this care plan.  All questions answered.

## 2023-01-24 ENCOUNTER — TELEPHONE (OUTPATIENT)
Dept: FAMILY MEDICINE CLINIC | Age: 53
End: 2023-01-24

## 2023-01-24 DIAGNOSIS — S22.080A COMPRESSION FRACTURE OF T12 VERTEBRA, INITIAL ENCOUNTER (HCC): Primary | ICD-10-CM

## 2023-01-24 DIAGNOSIS — S32.010A CLOSED COMPRESSION FRACTURE OF BODY OF L1 VERTEBRA (HCC): ICD-10-CM

## 2023-01-24 DIAGNOSIS — S22.000A COMPRESSION FRACTURE OF BODY OF THORACIC VERTEBRA (HCC): Primary | ICD-10-CM

## 2023-01-24 RX ORDER — PYRAZINAMIDE 500 MG/1
1-2 TABLET ORAL EVERY 8 HOURS PRN
Qty: 30 TABLET | Refills: 0 | Status: SHIPPED | OUTPATIENT
Start: 2023-01-24 | End: 2023-01-31

## 2023-01-24 NOTE — TELEPHONE ENCOUNTER
Mika Escobar was trying to help pt get scheduled for her MRIs, in this process, Tamara Ferguson told [place] that this is a workman's come case. Routing so doctor can help get her set up with workman's comp doctor.

## 2023-01-24 NOTE — TELEPHONE ENCOUNTER
Unfortunately, this is Worker's Compensation, which I was not originally aware of. I do not do Worker's Compensation. Patient will need to see a Worker's Compensation physician. She has 2 options that I know of. She could go to work med in [facility]: [address] [facility], [facility], [facility]  Phone: [phone]) [facility] surgeon Dr. Juventino Carpenter may work with her on this. She will likely call their office for an appointment to see how that process works. She should not get her MRIs done nor see neurosurgery, because those things have to be approved by the [employer] process. Otherwise they might not be covered. You can cancel that referral to neurosurgery and cancel the MRIs that I ordered if possible. I cannot do that.

## 2023-01-25 NOTE — TELEPHONE ENCOUNTER
I spoke to Sherrlyn Olszewski and explained the situation and gave her the phone number for [employer]. She does understand this and will call them today. She will call back if she needs records sent elsewhere.

## 2023-02-22 ENCOUNTER — HOSPITAL ENCOUNTER (OUTPATIENT)
Dept: MRI IMAGING | Age: 53
Discharge: HOME | End: 2023-02-24
Payer: COMMERCIAL

## 2023-02-22 DIAGNOSIS — S22.030D CLOSED WEDGE COMPRESSION FRACTURE OF T3 VERTEBRA WITH ROUTINE HEALING, SUBSEQUENT ENCOUNTER: ICD-10-CM

## 2023-02-22 DIAGNOSIS — S22.010D CLOSED WEDGE COMPRESSION FRACTURE OF T1 VERTEBRA WITH ROUTINE HEALING, SUBSEQUENT ENCOUNTER: ICD-10-CM

## 2023-02-22 DIAGNOSIS — S22.080D CLOSED WEDGE COMPRESSION FRACTURE OF T12 VERTEBRA WITH ROUTINE HEALING, SUBSEQUENT ENCOUNTER: ICD-10-CM

## 2023-02-22 PROCEDURE — 72148 MRI LUMBAR SPINE W/O DYE: CPT

## 2023-02-24 ENCOUNTER — TELEPHONE (OUTPATIENT)
Dept: PHYSICAL MEDICINE AND REHAB | Age: 53
End: 2023-02-24

## 2023-02-24 ENCOUNTER — TELEPHONE (OUTPATIENT)
Dept: FAMILY MEDICINE CLINIC | Age: 53
End: 2023-02-24

## 2023-02-24 NOTE — TELEPHONE ENCOUNTER
----- Message from Hardy Arias sent at 2/24/2023  8:23 AM EST -----  Subject: Message to Provider    QUESTIONS  Information for Provider? PT needs to schedule for an MRI results f/u  ---------------------------------------------------------------------------  --------------  Darwin Josue INFO  [phone]; OK to leave message on voicemail  ---------------------------------------------------------------------------  --------------  SCRIPT ANSWERS  Relationship to Patient?  Self

## 2023-02-24 NOTE — TELEPHONE ENCOUNTER
Inadvertently received a call regarding patients MRI being addended and radiology wanted to make sure the provider was made aware and seen the new results. Unable to reach workmed to notify them. Tried multiple times and phone rings busy. Patient was notified and will try again to notify them. Their phone number is [phone] for [facility] which is where the patient goes or [phone] for [place] if unable to reach [facility]. Both numbers have been tried. Sent fax of MRI results after trying numerous times again.

## 2023-03-13 ENCOUNTER — OFFICE VISIT (OUTPATIENT)
Dept: FAMILY MEDICINE CLINIC | Age: 53
End: 2023-03-13
Payer: MEDICAID

## 2023-03-13 VITALS
OXYGEN SATURATION: 99 % | TEMPERATURE: 98.6 F | SYSTOLIC BLOOD PRESSURE: 150 MMHG | BODY MASS INDEX: 25.66 KG/M2 | DIASTOLIC BLOOD PRESSURE: 94 MMHG | HEART RATE: 88 BPM | HEIGHT: 65 IN | WEIGHT: 154 LBS

## 2023-03-13 DIAGNOSIS — S22.070D COMPRESSION FRACTURE OF T10 VERTEBRA WITH ROUTINE HEALING, SUBSEQUENT ENCOUNTER: ICD-10-CM

## 2023-03-13 DIAGNOSIS — F41.9 ANXIETY: ICD-10-CM

## 2023-03-13 DIAGNOSIS — M85.80 OSTEOPENIA, UNSPECIFIED LOCATION: Primary | ICD-10-CM

## 2023-03-13 PROCEDURE — G8427 DOCREV CUR MEDS BY ELIG CLIN: HCPCS | Performed by: FAMILY MEDICINE

## 2023-03-13 PROCEDURE — G8419 CALC BMI OUT NRM PARAM NOF/U: HCPCS | Performed by: FAMILY MEDICINE

## 2023-03-13 PROCEDURE — 3080F DIAST BP >= 90 MM HG: CPT | Performed by: FAMILY MEDICINE

## 2023-03-13 PROCEDURE — 3077F SYST BP >= 140 MM HG: CPT | Performed by: FAMILY MEDICINE

## 2023-03-13 PROCEDURE — 4004F PT TOBACCO SCREEN RCVD TLK: CPT | Performed by: FAMILY MEDICINE

## 2023-03-13 PROCEDURE — G8484 FLU IMMUNIZE NO ADMIN: HCPCS | Performed by: FAMILY MEDICINE

## 2023-03-13 PROCEDURE — 99214 OFFICE O/P EST MOD 30 MIN: CPT | Performed by: FAMILY MEDICINE

## 2023-03-13 PROCEDURE — 3017F COLORECTAL CA SCREEN DOC REV: CPT | Performed by: FAMILY MEDICINE

## 2023-03-13 ASSESSMENT — PATIENT HEALTH QUESTIONNAIRE - PHQ9
6. FEELING BAD ABOUT YOURSELF - OR THAT YOU ARE A FAILURE OR HAVE LET YOURSELF OR YOUR FAMILY DOWN: 1
1. LITTLE INTEREST OR PLEASURE IN DOING THINGS: 3
SUM OF ALL RESPONSES TO PHQ QUESTIONS 1-9: 15
SUM OF ALL RESPONSES TO PHQ QUESTIONS 1-9: 15
3. TROUBLE FALLING OR STAYING ASLEEP: 0
5. POOR APPETITE OR OVEREATING: 3
2. FEELING DOWN, DEPRESSED OR HOPELESS: 3
10. IF YOU CHECKED OFF ANY PROBLEMS, HOW DIFFICULT HAVE THESE PROBLEMS MADE IT FOR YOU TO DO YOUR WORK, TAKE CARE OF THINGS AT HOME, OR GET ALONG WITH OTHER PEOPLE: 2
7. TROUBLE CONCENTRATING ON THINGS, SUCH AS READING THE NEWSPAPER OR WATCHING TELEVISION: 2
4. FEELING TIRED OR HAVING LITTLE ENERGY: 3
SUM OF ALL RESPONSES TO PHQ QUESTIONS 1-9: 15
9. THOUGHTS THAT YOU WOULD BE BETTER OFF DEAD, OR OF HURTING YOURSELF: 0
SUM OF ALL RESPONSES TO PHQ9 QUESTIONS 1 & 2: 6
SUM OF ALL RESPONSES TO PHQ QUESTIONS 1-9: 15
8. MOVING OR SPEAKING SO SLOWLY THAT OTHER PEOPLE COULD HAVE NOTICED. OR THE OPPOSITE, BEING SO FIGETY OR RESTLESS THAT YOU HAVE BEEN MOVING AROUND A LOT MORE THAN USUAL: 0

## 2023-03-13 NOTE — PATIENT INSTRUCTIONS
[facility], [facility]  Address: [address] [facility], [place] [address], [address] [facility]  Phone: [phone]    [address]    Preferred Care Counseling    Leading ada [facility] Office  [address]  [facility], [address] [place] [address]  [phone]    Preferred [address]   [place], [address]  [phone]        [facility] Office:  Phone: [phone]  Fax: [address] Office:   Phone: [phone]  Fax: [phone]    [facility] Office:   Phone: [phone]  Fax: [address]  [address] [facility] [address]  Phone: [address] [place] [address]  [address]  [facility], [address] [facility]  Phone: Kathleen 6  [address] [facility] [address] [facility], [address] [place] [address]  Phone: [phone]

## 2023-03-13 NOTE — PROGRESS NOTES
[facility]  Office Progress Note - Dr. Giuseppe Bauer  3/13/23    CC:   Chief Complaint   Patient presents with    Anxiety     Feeling more anxious since work related back injury in Jan 2023    Back Pain     Hurt back at work in Jan.  Does have a pre existing lower back problem, that was also a work related injury about 7 years ago. That worker's comp case did end up paying out. Does have workers comp case started for current, Jan injury and is seeing a [place] comp doctor in [facility] but would like doctor's opinion on the old injury and this new one. BP (!) 150/94   Pulse 88   Temp 98.6 °F (37 °C) (Temporal)   Ht 5' 5\" (1.651 m)   Wt 154 lb (69.9 kg)   SpO2 99%   BMI 25.63 kg/m²   Wt Readings from Last 3 Encounters:   03/13/23 154 lb (69.9 kg)   07/07/22 148 lb (67.1 kg)   01/07/22 148 lb (67.1 kg)       HPI: Patient presents to discuss recent health system problems with her back pain work-up and seek an opinion on that. She had a back injury at work when lifting a gumball machine. It is improved pain, but the more she does, the worse it hurts. She has been managing with ibuprofen and Flexeril and has been able to decrease the frequency of those uses. She had an MRI lumbar spine, but has superior endplate changes at G25 and attempting to get MRI thoracic spine - approval pending through [employer]. T10 was only partially visualized. Its been 2 weeks since she has heard back and she continues to be off work and without direction. She does not understand why it is taking so long and communication has been poor. For clarity all of this is happening with a Worker's Comp. physician and I have not been treating the problem. Patient wanted to talk about options. She is not having any focal neurologic symptoms such as lower extremity weakness or bowel or bladder continence problems.     I think that she could either continue in the [employer]. system where she can give up with [employer]. and started to work this up in the private system -but probably would forfeit any potential benefits through [employer]. in the future if she did that. Her anxiety has been significantly increased since the work injury and loss of daily routine/loss of work. She continues taking BuSpar 10 mg twice daily and a total of 60 mg of fluoxetine daily. Eating more from stress. Physical activity difficult with the back pain. Stress from dealing with the insurance system and not knowing how to proceed. _________________________________________________________    Assessment / Nayla Aleah was seen today for anxiety and back pain. Diagnoses and all orders for this visit:    Osteopenia, unspecified location  -     DEXA BONE DENSITY AXIAL SKELETON; Future    Compression fracture of T10 vertebra with routine healing, subsequent encounter  -     DEXA BONE DENSITY AXIAL SKELETON; Future    Anxiety    Recommended some strategies to help with getting the afternoon dose of buspar. Could inc fluoxetine to 80mg daily if she wanted ,but we'll hold on that for now. Discussed some short term counseling which she is interested in - given local options to contact. Regarding her back injury claim with workers comp, I suspect she can either continue in that system with little control and possibility of delayed care. Or she can switch over to the public system and we'll work this up as any other back pain - I suspect forgoing any benefits she might receive in the workers comp system. She'll talk with her [email or web address] doctor and let me know what she decides. PRN or as scheduled. Patient counseled to follow up sooner or seek more acute care if symptoms worsening or not improving according to plan.      Electronically signed by Carine Olivier MD on 3/13/2023  Please note that >30 minutes was spent on patient care,  face-to-face with patient, including gathering history, performing physical exam, discussing findings, counseling patient, determining plan forward, documenting the encounter and coordinating patient care. All questions addressed and answered. _________________________________________________________  Current Outpatient Medications on File Prior to Visit   Medication Sig Dispense Refill    FLUoxetine (PROZAC) 20 MG capsule take 1 capsule by mouth every morning and 2 capsules every evening 90 capsule 5    omeprazole (PRILOSEC) 20 MG delayed release capsule take 1 capsule by mouth once daily 90 capsule 1    busPIRone (BUSPAR) 10 MG tablet take 1 tablet by mouth twice a day MAY INCREASE TO 3 TIMES A DAY DURING MORE ANXIOUS EPISODES 90 tablet 5    amLODIPine-benazepril (LOTREL) 5-20 MG per capsule take 1 capsule by mouth once daily 90 capsule 1    COLLAGEN PO Take by mouth      triamcinolone (KENALOG) 0.1 % cream Apply twice a day to active rash on hands and elbow. hydrocortisone 2.5 % ointment apply to affected area ON face twice a day (DO NOT PUT INSIDE EYES)      aspirin 81 MG EC tablet Take 81 mg by mouth daily      hydrocortisone 2.5 % cream Apply twice a day to active rash on face.       Multiple Vitamins-Minerals (CENTRA-JACOBY PO) Take by mouth daily      Lifitegrast 5 % SOLN Apply to eye 2 times daily       hydroxychloroquine (PLAQUENIL) 200 MG tablet Take 400 mg by mouth daily       Omega-3 Fatty Acids (FISH OIL) 1000 MG CAPS Take 3,000 mg by mouth daily       hypromellose (ARTIFICIAL TEARS) 0.4 % SOLN ophthalmic solution Place 1 drop into both eyes every 4 hours as needed (dry eyes) 15 mL 0    vitamin D (CHOLECALCIFEROL) 1000 UNIT TABS tablet Take 1,000 Units by mouth every morning       Multiple Vitamins-Minerals (HAIR/SKIN/NAILS PO) Take 1 tablet by mouth every morning       B Complex Vitamins (VITAMIN B COMPLEX PO) Take 1 tablet by mouth every morning       CALCIUM-VITAMIN D PO Take 1 tablet by mouth every morning        No current facility-administered medications on file prior to visit. Patient Active Problem List   Diagnosis Code    Anxiety F41.9    HTN (hypertension) I10    Raynaud's phenomenon I73.00    Chronic daily headache R51.9    Leukocytosis D72.829    Cigarette smoker F17.210    Depression F32. A    Elevated LFTs R79.89    Peripheral neuropathy G62.9    Hx of Raynaud's syndrome Z86.79    Tobacco use Z72.0    Chronic pancreatitis, unspecified pancreatitis type ([facility] [address] K86.1    Sicca syndrome ([facility] [address] M35.00     _________________________________________________________  Past Medical History:   Diagnosis Date    Anxiety     Depression     Herniated disc     Hx of Raynaud's syndrome 1/25/2021    Hypertension     Peripheral neuropathy 1/25/2021    Pneumonia     Raynaud's disease     TMJ (dislocation of temporomandibular joint)        Family History   Problem Relation Age of Onset    Heart Disease Father         CHF       Past Surgical History:   Procedure Laterality Date    HYSTERECTOMY (CERVIX STATUS UNKNOWN)      OVARIAN CYST REMOVAL      benign mass removal    SKIN BIOPSY Left     left hand       Social History     Tobacco Use    Smoking status: Every Day     Packs/day: 0.25     Years: 30.00     Pack years: 7.50     Types: Cigarettes    Smokeless tobacco: Never   Vaping Use    Vaping Use: Never used   Substance Use Topics    Alcohol use: Yes     Comment: occasional    Drug use: No       Chart reviewed and updated where appropriate for PMH, Fam, and Soc Hx.  _________________________________________________________  ROS: POSITIVE: As in the HPI. Otherwise Pertinent negatives are negative.    __________________________________________________________  Physical Exam   Constitutional:    She is oriented to person, place, and time. She appears well-developed and well-nourished. Eyes:    Conjunctivae are normal.    Pupils are equal, round, and reactive to light. EOMI. Neck:    Normal range of motion. No thyromegaly or nodules noted. No bruit.  No LAD.  Cardiovascular:    Normal rate, regular rhythm and normal heart sounds. No murmur. No gallop and no friction rub. Pulmonary/Chest:    Effort normal and breath sounds normal.    No wheezes. No rales or rhonchi. Abdominal:    Soft. Bowel sounds are normal.    No distension. No tenderness. Musculoskeletal:    Normal range of motion. Point tenderenss over around T10 - mid to lower thoracic spine and paraspinal tenderness, myah on the right thoracic. No joint swelling noted. No peripheral edema. Neurological:    She is A&Ox3    Motor and sensation grossly intact. Normal Gait. Psychiatric:    She has an anxious mood and affect. Normal groom and dress. No SI or HI.   ________________________________________________________    This note may have been created using dictation software.  Efforts were made to reduce errors, but some may persist.

## 2023-03-23 ENCOUNTER — HOSPITAL ENCOUNTER (OUTPATIENT)
Dept: MRI IMAGING | Age: 53
Discharge: HOME | End: 2023-03-25
Payer: COMMERCIAL

## 2023-03-23 DIAGNOSIS — S33.5XXA LUMBAR SPRAIN, INITIAL ENCOUNTER: ICD-10-CM

## 2023-03-23 PROCEDURE — 72146 MRI CHEST SPINE W/O DYE: CPT

## 2023-03-31 ENCOUNTER — OFFICE VISIT (OUTPATIENT)
Dept: FAMILY MEDICINE CLINIC | Age: 53
End: 2023-03-31
Payer: MEDICAID

## 2023-03-31 VITALS
DIASTOLIC BLOOD PRESSURE: 84 MMHG | WEIGHT: 155 LBS | SYSTOLIC BLOOD PRESSURE: 130 MMHG | HEIGHT: 65 IN | HEART RATE: 90 BPM | TEMPERATURE: 97.8 F | OXYGEN SATURATION: 100 % | BODY MASS INDEX: 25.83 KG/M2

## 2023-03-31 DIAGNOSIS — M54.6 CHRONIC MIDLINE THORACIC BACK PAIN: ICD-10-CM

## 2023-03-31 DIAGNOSIS — G89.29 CHRONIC MIDLINE THORACIC BACK PAIN: ICD-10-CM

## 2023-03-31 DIAGNOSIS — M85.80 OSTEOPENIA, UNSPECIFIED LOCATION: Primary | ICD-10-CM

## 2023-03-31 PROCEDURE — G8484 FLU IMMUNIZE NO ADMIN: HCPCS | Performed by: FAMILY MEDICINE

## 2023-03-31 PROCEDURE — 99214 OFFICE O/P EST MOD 30 MIN: CPT | Performed by: FAMILY MEDICINE

## 2023-03-31 PROCEDURE — 3079F DIAST BP 80-89 MM HG: CPT | Performed by: FAMILY MEDICINE

## 2023-03-31 PROCEDURE — G8419 CALC BMI OUT NRM PARAM NOF/U: HCPCS | Performed by: FAMILY MEDICINE

## 2023-03-31 PROCEDURE — 4004F PT TOBACCO SCREEN RCVD TLK: CPT | Performed by: FAMILY MEDICINE

## 2023-03-31 PROCEDURE — 3017F COLORECTAL CA SCREEN DOC REV: CPT | Performed by: FAMILY MEDICINE

## 2023-03-31 PROCEDURE — G8427 DOCREV CUR MEDS BY ELIG CLIN: HCPCS | Performed by: FAMILY MEDICINE

## 2023-03-31 PROCEDURE — 3075F SYST BP GE 130 - 139MM HG: CPT | Performed by: FAMILY MEDICINE

## 2023-03-31 RX ORDER — ALENDRONATE SODIUM 70 MG/1
70 TABLET ORAL
Qty: 4 TABLET | Refills: 0 | Status: SHIPPED | OUTPATIENT
Start: 2023-03-31

## 2023-03-31 RX ORDER — MELOXICAM 7.5 MG/1
7.5 TABLET ORAL 2 TIMES DAILY PRN
Qty: 60 TABLET | Refills: 0 | Status: SHIPPED | OUTPATIENT
Start: 2023-03-31

## 2023-03-31 SDOH — ECONOMIC STABILITY: TRANSPORTATION INSECURITY
IN THE PAST 12 MONTHS, HAS LACK OF TRANSPORTATION KEPT YOU FROM MEETINGS, WORK, OR FROM GETTING THINGS NEEDED FOR DAILY LIVING?: NO

## 2023-03-31 SDOH — ECONOMIC STABILITY: INCOME INSECURITY: HOW HARD IS IT FOR YOU TO PAY FOR THE VERY BASICS LIKE FOOD, HOUSING, MEDICAL CARE, AND HEATING?: SOMEWHAT HARD

## 2023-03-31 SDOH — ECONOMIC STABILITY: FOOD INSECURITY: WITHIN THE PAST 12 MONTHS, YOU WORRIED THAT YOUR FOOD WOULD RUN OUT BEFORE YOU GOT MONEY TO BUY MORE.: NEVER TRUE

## 2023-03-31 SDOH — ECONOMIC STABILITY: FOOD INSECURITY: WITHIN THE PAST 12 MONTHS, THE FOOD YOU BOUGHT JUST DIDN'T LAST AND YOU DIDN'T HAVE MONEY TO GET MORE.: NEVER TRUE

## 2023-03-31 SDOH — ECONOMIC STABILITY: HOUSING INSECURITY
IN THE LAST 12 MONTHS, WAS THERE A TIME WHEN YOU DID NOT HAVE A STEADY PLACE TO SLEEP OR SLEPT IN A SHELTER (INCLUDING NOW)?: NO

## 2023-03-31 NOTE — PROGRESS NOTES
[facility]  Office Progress Note - Dr. Calderon Feeling  3/31/23    CC:   Chief Complaint   Patient presents with    Discuss Medications     Discuss new med after recent dexa results        /84   Pulse 90   Temp 97.8 °F (36.6 °C) (Temporal)   Ht 5' 5\" (1.651 m)   Wt 155 lb (70.3 kg)   SpO2 100%   BMI 25.79 kg/m²   Wt Readings from Last 3 Encounters:   03/31/23 155 lb (70.3 kg)   03/13/23 154 lb (69.9 kg)   07/07/22 148 lb (67.1 kg)       HPI: osteopenia on dexa  Has had chronic wedge deformity of 2 lumbar vertebrae. More recently had a T10 compression Fx. Fractured 2 ribs as well. Discussed RvBvA of Fosamax therapy. She has already been taking 1200mg calcium and vitamin D for years. _________________________________________________________    Assessment / Maxwell Lesches was seen today for discuss medications. Diagnoses and all orders for this visit:    Osteopenia, unspecified location  -  START   alendronate (FOSAMAX) 70 MG tablet; Take 1 tablet by mouth every 7 days Take on empty stomach, full glass of water, stay upright for 30 minutes. Chronic midline thoracic back pain  -  Start   meloxicam (MOBIC) 7.5 MG tablet; Take 1 tablet by mouth 2 times daily as needed for Pain  We'll try some meloxicam instead of ibuprofen. Discussed RvB. Tylennol for prn use in addition. Seeing NS in April. Return in about 6 months (around 9/30/2023). or as scheduled. Patient counseled to follow up sooner or seek more acute care if symptoms worsening or not improving according to plan.      Electronically signed by Fred Tarango MD on 3/31/2023    _________________________________________________________  Current Outpatient Medications on File Prior to Visit   Medication Sig Dispense Refill    FLUoxetine (PROZAC) 20 MG capsule take 1 capsule by mouth every morning and 2 capsules every evening 90 capsule 5    omeprazole (PRILOSEC) 20 MG delayed release capsule take 1 capsule by mouth

## 2023-04-05 ENCOUNTER — TELEPHONE (OUTPATIENT)
Dept: FAMILY MEDICINE CLINIC | Age: 53
End: 2023-04-05

## 2023-04-05 NOTE — TELEPHONE ENCOUNTER
Ines Valdez calling in she would like to know how to go about getting a second opinion read on the mri of her thoracic spine by a different dr?

## 2023-04-05 NOTE — TELEPHONE ENCOUNTER
Recommend that she request Dr. Farzaneh Goldman to review the images and give his opinion. Hell be looking at images regardless at her upcoming appt. She should seek his opinion on the images and extent of the compression fracture.

## 2023-04-20 DIAGNOSIS — M85.80 OSTEOPENIA, UNSPECIFIED LOCATION: ICD-10-CM

## 2023-04-20 RX ORDER — ALENDRONATE SODIUM 70 MG/1
TABLET ORAL
Qty: 4 TABLET | Refills: 0 | OUTPATIENT
Start: 2023-04-20

## 2023-04-27 ENCOUNTER — OFFICE VISIT (OUTPATIENT)
Dept: NEUROSURGERY | Age: 53
End: 2023-04-27
Payer: MEDICAID

## 2023-04-27 VITALS
DIASTOLIC BLOOD PRESSURE: 92 MMHG | WEIGHT: 155 LBS | BODY MASS INDEX: 25.83 KG/M2 | HEIGHT: 65 IN | SYSTOLIC BLOOD PRESSURE: 159 MMHG | OXYGEN SATURATION: 98 % | TEMPERATURE: 97.1 F | HEART RATE: 83 BPM

## 2023-04-27 DIAGNOSIS — S22.000A COMPRESSION FRACTURE OF THORACIC VERTEBRA, UNSPECIFIED THORACIC VERTEBRAL LEVEL, INITIAL ENCOUNTER (HCC): Primary | ICD-10-CM

## 2023-04-27 PROCEDURE — 4004F PT TOBACCO SCREEN RCVD TLK: CPT | Performed by: NEUROLOGICAL SURGERY

## 2023-04-27 PROCEDURE — G8427 DOCREV CUR MEDS BY ELIG CLIN: HCPCS | Performed by: NEUROLOGICAL SURGERY

## 2023-04-27 PROCEDURE — 3077F SYST BP >= 140 MM HG: CPT | Performed by: NEUROLOGICAL SURGERY

## 2023-04-27 PROCEDURE — G8419 CALC BMI OUT NRM PARAM NOF/U: HCPCS | Performed by: NEUROLOGICAL SURGERY

## 2023-04-27 PROCEDURE — 99204 OFFICE O/P NEW MOD 45 MIN: CPT | Performed by: NEUROLOGICAL SURGERY

## 2023-04-27 PROCEDURE — 3080F DIAST BP >= 90 MM HG: CPT | Performed by: NEUROLOGICAL SURGERY

## 2023-04-27 PROCEDURE — 99202 OFFICE O/P NEW SF 15 MIN: CPT

## 2023-04-27 PROCEDURE — 3017F COLORECTAL CA SCREEN DOC REV: CPT | Performed by: NEUROLOGICAL SURGERY

## 2023-04-27 RX ORDER — TRAMADOL HYDROCHLORIDE 50 MG/1
TABLET ORAL
COMMUNITY
Start: 2023-04-12

## 2023-04-27 ASSESSMENT — ENCOUNTER SYMPTOMS
ALLERGIC/IMMUNOLOGIC NEGATIVE: 1
BACK PAIN: 1
GASTROINTESTINAL NEGATIVE: 1
APNEA: 0
EYES NEGATIVE: 1

## 2023-04-28 NOTE — PROGRESS NOTES
Kimberly Ayala (:  1970) is a 47 y.o. female,New patient, here for evaluation of the following chief complaint(s):  New Patient (Ref. By Dr. Paris Connor fracture/[facility])         ASSESSMENT/PLAN:  1. Compression fracture of thoracic vertebra, unspecified thoracic vertebral level, initial encounter [facility])  47year old lady who presents with back pain. This is a worker comp related visit and I will send a letter to her referring physician. The allowed code is S33. 5XXA. She does however have a T10 acute compression fracture. She got hurt in January. I discussed  versus kyphoplasty. She will think about it. No follow-ups on file. Subjective   SUBJECTIVE/OBJECTIVE:  HPI  47year old lady who picked up a heavy object at work and heard a crack in her back. The pain is about a 7/10 with activity. The pain is better with rest. She has tried NSAIDs and heat. She denies numbness, tingling  or weakness in her legs. She also denies loss of control of bowel or bladder function. Her MRI shows an acute T10 compression fracture     Review of Systems   Constitutional: Negative. HENT: Negative. Eyes: Negative. Respiratory:  Negative for apnea. Cardiovascular: Negative. Gastrointestinal: Negative. Endocrine: Negative. Genitourinary: Negative. Musculoskeletal:  Positive for back pain. Skin: Negative. Allergic/Immunologic: Negative. Neurological: Negative. Hematological:  Bruises/bleeds easily. Psychiatric/Behavioral: Negative. Objective   Physical Exam  Constitutional:       General: She is not in acute distress. Appearance: Normal appearance. She is normal weight. She is not ill-appearing, toxic-appearing or diaphoretic. HENT:      Head: Normocephalic and atraumatic. Right Ear: Tympanic membrane, ear canal and external ear normal. There is no impacted cerumen. Left Ear: Tympanic membrane and ear canal normal. There is no impacted cerumen.

## 2023-05-02 DIAGNOSIS — M85.80 OSTEOPENIA, UNSPECIFIED LOCATION: ICD-10-CM

## 2023-05-02 RX ORDER — ALENDRONATE SODIUM 70 MG/1
70 TABLET ORAL
Qty: 4 TABLET | Refills: 5 | Status: SHIPPED | OUTPATIENT
Start: 2023-05-02

## 2023-05-08 ENCOUNTER — TELEPHONE (OUTPATIENT)
Dept: NEUROSURGERY | Age: 53
End: 2023-05-08

## 2023-05-08 NOTE — TELEPHONE ENCOUNTER
Patient saw Dr. Melony Charlton on 4-27. 23. She  would like to schedule a kyphoplasty. She is WC. Please call her back. I told her it might be a couple of days since  is in surgery on Mondays. 21 [phone]. She wants her medical records to Dr Brooks Cornejo.   [phone]

## 2023-05-10 ENCOUNTER — PREP FOR PROCEDURE (OUTPATIENT)
Dept: NEUROSURGERY | Age: 53
End: 2023-05-10

## 2023-05-10 ENCOUNTER — TELEPHONE (OUTPATIENT)
Dept: NEUROSURGERY | Age: 53
End: 2023-05-10

## 2023-05-10 PROBLEM — S22.070A WEDGE COMPRESSION FRACTURE OF T10 VERTEBRA (HCC): Status: ACTIVE | Noted: 2023-05-10

## 2023-05-10 RX ORDER — SODIUM CHLORIDE 0.9 % (FLUSH) 0.9 %
5-40 SYRINGE (ML) INJECTION PRN
Status: CANCELLED | OUTPATIENT
Start: 2023-05-10

## 2023-05-10 RX ORDER — ACETAMINOPHEN 325 MG/1
1000 TABLET ORAL ONCE
Status: CANCELLED | OUTPATIENT
Start: 2023-05-10 | End: 2023-05-10

## 2023-05-10 RX ORDER — SODIUM CHLORIDE 9 MG/ML
INJECTION, SOLUTION INTRAVENOUS PRN
Status: CANCELLED | OUTPATIENT
Start: 2023-05-10

## 2023-05-10 RX ORDER — SODIUM CHLORIDE 9 MG/ML
INJECTION, SOLUTION INTRAVENOUS CONTINUOUS
Status: CANCELLED | OUTPATIENT
Start: 2023-05-10

## 2023-05-10 RX ORDER — SODIUM CHLORIDE 0.9 % (FLUSH) 0.9 %
5-40 SYRINGE (ML) INJECTION EVERY 12 HOURS SCHEDULED
Status: CANCELLED | OUTPATIENT
Start: 2023-05-10

## 2023-05-10 NOTE — TELEPHONE ENCOUNTER
Prior Authorization Form:      DEMOGRAPHICS:                     Patient Name:  Crescencio Mckee  Patient :  1970            Insurance:  Payor: [facility] / Plan: [place] / Product Type: *No Product type* /   Insurance ID Number:    Payer/Plan Subscr  Sex Relation Sub. Ins. ID Effective Group Num   1. BROOKE HERRON * JONAS RIZVI 1970 Female Self [id number]                                   [address]   2.  [address] Samaniego L 1970 Female Self [id number]                                   [address]         DIAGNOSIS & PROCEDURE:                       Procedure/Operation: T10 Kyphoplasty           CPT Code: 86063    Diagnosis:  T10 compression fracture    ICD10 Code: S22.070A    Location:  main    Surgeon:  Natividad Catalan INFORMATION:                          Date: 23    Time: follow              Anesthesia:  [facility]                                                       Status:  Outpatient        Special Comments:  [employer]       Electronically signed by Salazar Jimenez MA on 5/10/2023 at 1:58 PM

## 2023-05-18 ENCOUNTER — TELEPHONE (OUTPATIENT)
Dept: NEUROSURGERY | Age: 53
End: 2023-05-18

## 2023-05-18 DIAGNOSIS — S22.070A CLOSED WEDGE COMPRESSION FRACTURE OF T10 VERTEBRA, INITIAL ENCOUNTER (HCC): ICD-10-CM

## 2023-05-18 DIAGNOSIS — S33.5XXA LUMBAR SPRAIN, INITIAL ENCOUNTER: ICD-10-CM

## 2023-05-18 DIAGNOSIS — S22.000A COMPRESSION FRACTURE OF THORACIC VERTEBRA, UNSPECIFIED THORACIC VERTEBRAL LEVEL, INITIAL ENCOUNTER (HCC): Primary | ICD-10-CM

## 2023-05-18 NOTE — TELEPHONE ENCOUNTER
Patient's surgery for 6/1/23 with Dr Kaushal Toro is canceled due to [facility] denial. Pt informed and voiced understanding. Patient would like to try and get a brace approved with WC. I will submitted the C9 for a brace.

## 2023-06-08 DIAGNOSIS — F41.9 ANXIETY: ICD-10-CM

## 2023-06-09 RX ORDER — BUSPIRONE HYDROCHLORIDE 10 MG/1
TABLET ORAL
Qty: 90 TABLET | Refills: 5 | OUTPATIENT
Start: 2023-06-09

## 2023-07-07 ENCOUNTER — PATIENT MESSAGE (OUTPATIENT)
Dept: FAMILY MEDICINE CLINIC | Age: 53
End: 2023-07-07
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
## 2023-07-07 RX ORDER — OMEPRAZOLE 20 MG/1
CAPSULE, DELAYED RELEASE ORAL
Qty: 90 CAPSULE | Refills: 1 | Status: SHIPPED | OUTPATIENT
Start: 2023-07-07

## 2023-07-07 RX ORDER — AMLODIPINE BESYLATE AND BENAZEPRIL HYDROCHLORIDE 5; 20 MG/1; MG/1
CAPSULE ORAL
Qty: 90 CAPSULE | Refills: 1 | Status: SHIPPED | OUTPATIENT
Start: 2023-07-07

## 2023-07-10 DIAGNOSIS — F41.9 ANXIETY: ICD-10-CM

## 2023-07-10 RX ORDER — FLUOXETINE HYDROCHLORIDE 20 MG/1
CAPSULE ORAL
Qty: 90 CAPSULE | Refills: 5 | Status: SHIPPED | OUTPATIENT
Start: 2023-07-10

## 2023-07-17 ENCOUNTER — TELEPHONE (OUTPATIENT)
Dept: FAMILY MEDICINE CLINIC | Age: 53
End: 2023-07-17

## 2023-07-17 RX ORDER — LIDOCAINE 50 MG/G
1 PATCH TOPICAL DAILY
Qty: 10 PATCH | Refills: 0 | Status: SHIPPED | OUTPATIENT
Start: 2023-07-17 | End: 2023-07-27

## 2023-07-17 NOTE — TELEPHONE ENCOUNTER
Muna Jose calling to tell you that she is still having rib pain from her fall 2 weeks ago. It has not improved much. She is Tylenol & Ibuprofen, and Flexoril with little relief. She is asking what you recommend?

## 2023-09-26 DIAGNOSIS — F41.9 ANXIETY: ICD-10-CM

## 2023-09-26 RX ORDER — BUSPIRONE HYDROCHLORIDE 10 MG/1
TABLET ORAL
Qty: 90 TABLET | Refills: 5 | Status: SHIPPED | OUTPATIENT
Start: 2023-09-26

## 2023-09-26 RX ORDER — BUSPIRONE HYDROCHLORIDE 10 MG/1
TABLET ORAL
Qty: 90 TABLET | Refills: 5 | OUTPATIENT
Start: 2023-09-26

## 2024-01-05 RX ORDER — AMLODIPINE BESYLATE AND BENAZEPRIL HYDROCHLORIDE 5; 20 MG/1; MG/1
CAPSULE ORAL
Qty: 90 CAPSULE | Refills: 1 | Status: SHIPPED | OUTPATIENT
Start: 2024-01-05

## 2024-01-05 RX ORDER — OMEPRAZOLE 20 MG/1
CAPSULE, DELAYED RELEASE ORAL
Qty: 90 CAPSULE | Refills: 1 | Status: SHIPPED | OUTPATIENT
Start: 2024-01-05

## 2024-01-08 DIAGNOSIS — F41.9 ANXIETY: ICD-10-CM

## 2024-01-09 RX ORDER — FLUOXETINE HYDROCHLORIDE 20 MG/1
CAPSULE ORAL
Qty: 90 CAPSULE | Refills: 5 | Status: SHIPPED | OUTPATIENT
Start: 2024-01-09

## 2024-03-01 DIAGNOSIS — F41.9 ANXIETY: ICD-10-CM

## 2024-03-01 RX ORDER — BUSPIRONE HYDROCHLORIDE 10 MG/1
TABLET ORAL
Qty: 90 TABLET | Refills: 5 | Status: SHIPPED | OUTPATIENT
Start: 2024-03-01

## 2024-03-11 ENCOUNTER — TELEPHONE (OUTPATIENT)
Dept: FAMILY MEDICINE CLINIC | Age: 54
End: 2024-03-11

## 2024-03-11 NOTE — TELEPHONE ENCOUNTER
----- Message from Rebeka Gibson sent at 3/11/2024  9:54 AM EDT -----  Subject: Message to Provider    QUESTIONS  Information for Provider? Pt has appt 4/22 but would like sooner. Please   call if there's a cancellation.  ---------------------------------------------------------------------------  --------------  CALL BACK INFO  [phone]; OK to leave message on voicemail  ---------------------------------------------------------------------------  --------------  SCRIPT ANSWERS  Relationship to Patient? Self

## 2024-04-05 ENCOUNTER — OFFICE VISIT (OUTPATIENT)
Dept: FAMILY MEDICINE CLINIC | Age: 54
End: 2024-04-05

## 2024-04-05 VITALS
HEIGHT: 65 IN | DIASTOLIC BLOOD PRESSURE: 86 MMHG | TEMPERATURE: 98 F | SYSTOLIC BLOOD PRESSURE: 142 MMHG | HEART RATE: 84 BPM | BODY MASS INDEX: 26.16 KG/M2 | OXYGEN SATURATION: 99 % | WEIGHT: 157 LBS

## 2024-04-05 DIAGNOSIS — Z13.31 POSITIVE DEPRESSION SCREENING: ICD-10-CM

## 2024-04-05 DIAGNOSIS — M85.80 OSTEOPENIA, UNSPECIFIED LOCATION: Primary | ICD-10-CM

## 2024-04-05 DIAGNOSIS — S22.000G COMPRESSION FRACTURE OF THORACIC VERTEBRA WITH DELAYED HEALING, UNSPECIFIED THORACIC VERTEBRAL LEVEL, SUBSEQUENT ENCOUNTER: ICD-10-CM

## 2024-04-05 PROBLEM — K86.1 CHRONIC PANCREATITIS, UNSPECIFIED PANCREATITIS TYPE (HCC): Status: RESOLVED | Noted: 2022-01-07 | Resolved: 2024-04-05

## 2024-04-05 PROBLEM — M35.00 SICCA SYNDROME (HCC): Status: RESOLVED | Noted: 2022-07-07 | Resolved: 2024-04-05

## 2024-04-05 PROCEDURE — 99213 OFFICE O/P EST LOW 20 MIN: CPT | Performed by: FAMILY MEDICINE

## 2024-04-05 PROCEDURE — 3077F SYST BP >= 140 MM HG: CPT | Performed by: FAMILY MEDICINE

## 2024-04-05 PROCEDURE — 3079F DIAST BP 80-89 MM HG: CPT | Performed by: FAMILY MEDICINE

## 2024-04-05 RX ORDER — CYCLOBENZAPRINE HCL 10 MG
10 TABLET ORAL 3 TIMES DAILY PRN
COMMUNITY

## 2024-04-05 SDOH — ECONOMIC STABILITY: FOOD INSECURITY: WITHIN THE PAST 12 MONTHS, YOU WORRIED THAT YOUR FOOD WOULD RUN OUT BEFORE YOU GOT MONEY TO BUY MORE.: NEVER TRUE

## 2024-04-05 SDOH — ECONOMIC STABILITY: INCOME INSECURITY: HOW HARD IS IT FOR YOU TO PAY FOR THE VERY BASICS LIKE FOOD, HOUSING, MEDICAL CARE, AND HEATING?: HARD

## 2024-04-05 SDOH — ECONOMIC STABILITY: FOOD INSECURITY: WITHIN THE PAST 12 MONTHS, THE FOOD YOU BOUGHT JUST DIDN'T LAST AND YOU DIDN'T HAVE MONEY TO GET MORE.: SOMETIMES TRUE

## 2024-04-05 ASSESSMENT — PATIENT HEALTH QUESTIONNAIRE - PHQ9
7. TROUBLE CONCENTRATING ON THINGS, SUCH AS READING THE NEWSPAPER OR WATCHING TELEVISION: SEVERAL DAYS
5. POOR APPETITE OR OVEREATING: SEVERAL DAYS
SUM OF ALL RESPONSES TO PHQ QUESTIONS 1-9: 11
SUM OF ALL RESPONSES TO PHQ QUESTIONS 1-9: 11
5. POOR APPETITE OR OVEREATING: SEVERAL DAYS
10. IF YOU CHECKED OFF ANY PROBLEMS, HOW DIFFICULT HAVE THESE PROBLEMS MADE IT FOR YOU TO DO YOUR WORK, TAKE CARE OF THINGS AT HOME, OR GET ALONG WITH OTHER PEOPLE: VERY DIFFICULT
SUM OF ALL RESPONSES TO PHQ QUESTIONS 1-9: 11
7. TROUBLE CONCENTRATING ON THINGS, SUCH AS READING THE NEWSPAPER OR WATCHING TELEVISION: SEVERAL DAYS
2. FEELING DOWN, DEPRESSED OR HOPELESS: MORE THAN HALF THE DAYS
3. TROUBLE FALLING OR STAYING ASLEEP: SEVERAL DAYS
9. THOUGHTS THAT YOU WOULD BE BETTER OFF DEAD, OR OF HURTING YOURSELF: NOT AT ALL
9. THOUGHTS THAT YOU WOULD BE BETTER OFF DEAD, OR OF HURTING YOURSELF: NOT AT ALL
10. IF YOU CHECKED OFF ANY PROBLEMS, HOW DIFFICULT HAVE THESE PROBLEMS MADE IT FOR YOU TO DO YOUR WORK, TAKE CARE OF THINGS AT HOME, OR GET ALONG WITH OTHER PEOPLE: VERY DIFFICULT
8. MOVING OR SPEAKING SO SLOWLY THAT OTHER PEOPLE COULD HAVE NOTICED. OR THE OPPOSITE - BEING SO FIDGETY OR RESTLESS THAT YOU HAVE BEEN MOVING AROUND A LOT MORE THAN USUAL: MORE THAN HALF THE DAYS
4. FEELING TIRED OR HAVING LITTLE ENERGY: SEVERAL DAYS
SUM OF ALL RESPONSES TO PHQ QUESTIONS 1-9: 11
2. FEELING DOWN, DEPRESSED OR HOPELESS: MORE THAN HALF THE DAYS
6. FEELING BAD ABOUT YOURSELF - OR THAT YOU ARE A FAILURE OR HAVE LET YOURSELF OR YOUR FAMILY DOWN: MORE THAN HALF THE DAYS
3. TROUBLE FALLING OR STAYING ASLEEP: SEVERAL DAYS
4. FEELING TIRED OR HAVING LITTLE ENERGY: SEVERAL DAYS
1. LITTLE INTEREST OR PLEASURE IN DOING THINGS: SEVERAL DAYS
SUM OF ALL RESPONSES TO PHQ QUESTIONS 1-9: 11
8. MOVING OR SPEAKING SO SLOWLY THAT OTHER PEOPLE COULD HAVE NOTICED. OR THE OPPOSITE, BEING SO FIGETY OR RESTLESS THAT YOU HAVE BEEN MOVING AROUND A LOT MORE THAN USUAL: MORE THAN HALF THE DAYS
SUM OF ALL RESPONSES TO PHQ9 QUESTIONS 1 & 2: 3
6. FEELING BAD ABOUT YOURSELF - OR THAT YOU ARE A FAILURE OR HAVE LET YOURSELF OR YOUR FAMILY DOWN: MORE THAN HALF THE DAYS

## 2024-07-08 RX ORDER — OMEPRAZOLE 20 MG/1
CAPSULE, DELAYED RELEASE ORAL
Qty: 90 CAPSULE | Refills: 1 | Status: SHIPPED | OUTPATIENT
Start: 2024-07-08

## 2024-07-08 RX ORDER — AMLODIPINE BESYLATE AND BENAZEPRIL HYDROCHLORIDE 5; 20 MG/1; MG/1
CAPSULE ORAL
Qty: 90 CAPSULE | Refills: 1 | Status: SHIPPED | OUTPATIENT
Start: 2024-07-08

## 2024-07-14 DIAGNOSIS — F41.9 ANXIETY: ICD-10-CM

## 2024-07-15 RX ORDER — FLUOXETINE HYDROCHLORIDE 20 MG/1
CAPSULE ORAL
Qty: 90 CAPSULE | Refills: 5 | Status: SHIPPED | OUTPATIENT
Start: 2024-07-15

## 2024-10-16 DIAGNOSIS — F41.9 ANXIETY: ICD-10-CM

## 2024-10-16 RX ORDER — BUSPIRONE HYDROCHLORIDE 10 MG/1
TABLET ORAL
Qty: 90 TABLET | Refills: 5 | Status: SHIPPED | OUTPATIENT
Start: 2024-10-16

## 2024-10-16 NOTE — TELEPHONE ENCOUNTER
Last Appointment:  4/5/2024  No future appointments.    
Normal rate, regular rhythm.  Heart sounds S1, S2.  No murmurs, rubs or gallops.

## 2024-11-26 DIAGNOSIS — F41.9 ANXIETY: ICD-10-CM

## 2024-12-02 ENCOUNTER — TELEPHONE (OUTPATIENT)
Dept: FAMILY MEDICINE CLINIC | Age: 54
End: 2024-12-02

## 2024-12-02 NOTE — TELEPHONE ENCOUNTER
I spoke with PMR treating physician Dr. Héctor Coffey regarding patient's previous osteopenia/compression fracture care, particularly about her bone density.  He was able to get this covered through Worker's Comp. claim.  We are discussing possible treatment options and what she has tried before.  Please send Dr. Coffey at [facility] our last progress note from April 2024 and March 2023, and DEXA scan from March 2023.  Thanks

## 2025-02-03 RX ORDER — AMLODIPINE AND BENAZEPRIL HYDROCHLORIDE 5; 20 MG/1; MG/1
1 CAPSULE ORAL DAILY
Qty: 90 CAPSULE | Refills: 1 | Status: SHIPPED | OUTPATIENT
Start: 2025-02-03

## 2025-02-03 NOTE — TELEPHONE ENCOUNTER
Name of Medication(s) Requested:  Requested Prescriptions     Pending Prescriptions Disp Refills    amLODIPine-benazepril (LOTREL) 5-20 MG per capsule [Pharmacy Med Name: AMLODIPINE BESYLATE/BENAZEPRIL HYDROCHLORIDE 5-20MG CAPSULE] 90 capsule 1     Sig: TAKE ONE CAPSULE BY MOUTH EVERY DAY    omeprazole (PRILOSEC) 20 MG delayed release capsule [Pharmacy Med Name: OMEPRAZOLE DR 20MG CAPSULE DR] 90 capsule 1     Sig: TAKE ONE CAPSULE BY MOUTH EVERY DAY       Medication is on current medication list Yes    Dosage and directions were verified? Yes    Quantity verified: 90 day supply     Pharmacy Verified?  Yes    Last Appointment:  4/5/2024    Future appts:  No future appointments.     (If no appt send self scheduling link. .REFILLAPPT)  Scheduling request sent?     [] Yes  [x] No    Does patient need updated?  [] Yes  [x] No

## 2025-02-05 LAB
ALBUMIN: 4.4 G/DL
ALP BLD-CCNC: 67 U/L
ALT SERPL-CCNC: 14 U/L
ANION GAP SERPL CALCULATED.3IONS-SCNC: NORMAL MMOL/L
AST SERPL-CCNC: 24 U/L
BILIRUB SERPL-MCNC: 0.4 MG/DL (ref 0.1–1.4)
BUN BLDV-MCNC: 5 MG/DL
CALCIUM SERPL-MCNC: 9.4 MG/DL
CHLORIDE BLD-SCNC: 97 MMOL/L
CO2: 28 MMOL/L
CREAT SERPL-MCNC: 0.67 MG/DL
GFR, ESTIMATED: 104
GLUCOSE BLD-MCNC: 73 MG/DL
POTASSIUM SERPL-SCNC: 4.1 MMOL/L
SODIUM BLD-SCNC: 32 MMOL/L
TOTAL PROTEIN: 6.7 G/DL (ref 6.4–8.2)

## 2025-04-22 DIAGNOSIS — F41.9 ANXIETY: ICD-10-CM

## 2025-04-23 RX ORDER — BUSPIRONE HYDROCHLORIDE 10 MG/1
TABLET ORAL
Qty: 90 TABLET | Refills: 5 | Status: SHIPPED | OUTPATIENT
Start: 2025-04-23

## 2025-05-02 ENCOUNTER — TELEPHONE (OUTPATIENT)
Dept: FAMILY MEDICINE CLINIC | Age: 55
End: 2025-05-02

## 2025-05-02 NOTE — TELEPHONE ENCOUNTER
----- Message from Wali PUENTE sent at 4/28/2025  9:32 AM EDT -----  Regarding: [facility] Appointment Request  E[facility] Appointment Request    Patient needs appointment for E[facility] Appointment Type: Annual Visit.    Patient Requested Dates(s): first or 2nd week of May  Patient Requested Time: morning  Provider Name:  Joao Ba MD     Reason for Appointment Request: Established Patient - Available appointments did not meet patient need. Patient is due to her annual physical and would like to get her medication check as well. Patient would like to have it sometime in May preferably in the morning.   --------------------------------------------------------------------------------------------------------------------------    Relationship to Patient: Self     Call Back Information: OK to leave message on voicemail  Preferred Call Back Number: Phone [phone]

## 2025-05-02 NOTE — TELEPHONE ENCOUNTER
Spoke with Margarita; let her know that there were no open appointments the weeks she was requesting due to Dr Ba being out of the office.     She was OK to keep appt at current date/time - Thursday 6/12 at 9:20 AM.

## 2025-06-12 ENCOUNTER — OFFICE VISIT (OUTPATIENT)
Dept: FAMILY MEDICINE CLINIC | Age: 55
End: 2025-06-12
Payer: COMMERCIAL

## 2025-06-12 ENCOUNTER — TELEPHONE (OUTPATIENT)
Dept: CARDIOLOGY | Age: 55
End: 2025-06-12

## 2025-06-12 VITALS
WEIGHT: 159 LBS | OXYGEN SATURATION: 100 % | TEMPERATURE: 97.8 F | SYSTOLIC BLOOD PRESSURE: 130 MMHG | DIASTOLIC BLOOD PRESSURE: 84 MMHG | HEART RATE: 85 BPM | BODY MASS INDEX: 26.46 KG/M2

## 2025-06-12 DIAGNOSIS — Z78.0 POSTMENOPAUSAL: ICD-10-CM

## 2025-06-12 DIAGNOSIS — Z12.31 ENCOUNTER FOR MAMMOGRAM TO ESTABLISH BASELINE MAMMOGRAM: ICD-10-CM

## 2025-06-12 DIAGNOSIS — M33.10 AMYOPATHIC DERMATOMYOSITIS (HCC): Primary | ICD-10-CM

## 2025-06-12 DIAGNOSIS — R07.9 CHEST PAIN, UNSPECIFIED TYPE: ICD-10-CM

## 2025-06-12 DIAGNOSIS — I99.9 VASCULAR LESION OF SKIN: ICD-10-CM

## 2025-06-12 DIAGNOSIS — Z87.81 HISTORY OF RECURRENT VERTEBRAL FRACTURES: ICD-10-CM

## 2025-06-12 DIAGNOSIS — E78.00 ELEVATED LDL CHOLESTEROL LEVEL: ICD-10-CM

## 2025-06-12 DIAGNOSIS — R01.1 HEART MURMUR: ICD-10-CM

## 2025-06-12 DIAGNOSIS — I35.0 NONRHEUMATIC AORTIC VALVE STENOSIS: ICD-10-CM

## 2025-06-12 PROCEDURE — 3079F DIAST BP 80-89 MM HG: CPT | Performed by: FAMILY MEDICINE

## 2025-06-12 PROCEDURE — 3075F SYST BP GE 130 - 139MM HG: CPT | Performed by: FAMILY MEDICINE

## 2025-06-12 PROCEDURE — 99214 OFFICE O/P EST MOD 30 MIN: CPT | Performed by: FAMILY MEDICINE

## 2025-06-12 RX ORDER — CELECOXIB 400 MG/1
200 CAPSULE ORAL 2 TIMES DAILY
COMMUNITY

## 2025-06-12 RX ORDER — FOLIC ACID 0.4 MG
400 TABLET ORAL DAILY
COMMUNITY

## 2025-06-12 RX ORDER — LIDOCAINE 4 G/G
1 PATCH TOPICAL DAILY
COMMUNITY

## 2025-06-12 RX ORDER — BACILLUS COAGULANS/INULIN 1B-250 MG
CAPSULE ORAL
COMMUNITY

## 2025-06-12 SDOH — ECONOMIC STABILITY: FOOD INSECURITY: WITHIN THE PAST 12 MONTHS, YOU WORRIED THAT YOUR FOOD WOULD RUN OUT BEFORE YOU GOT MONEY TO BUY MORE.: SOMETIMES TRUE

## 2025-06-12 SDOH — ECONOMIC STABILITY: INCOME INSECURITY: IN THE LAST 12 MONTHS, WAS THERE A TIME WHEN YOU WERE NOT ABLE TO PAY THE MORTGAGE OR RENT ON TIME?: NO

## 2025-06-12 SDOH — ECONOMIC STABILITY: FOOD INSECURITY: WITHIN THE PAST 12 MONTHS, THE FOOD YOU BOUGHT JUST DIDN'T LAST AND YOU DIDN'T HAVE MONEY TO GET MORE.: SOMETIMES TRUE

## 2025-06-12 SDOH — ECONOMIC STABILITY: TRANSPORTATION INSECURITY
IN THE PAST 12 MONTHS, HAS THE LACK OF TRANSPORTATION KEPT YOU FROM MEDICAL APPOINTMENTS OR FROM GETTING MEDICATIONS?: YES

## 2025-06-12 ASSESSMENT — PATIENT HEALTH QUESTIONNAIRE - PHQ9
8. MOVING OR SPEAKING SO SLOWLY THAT OTHER PEOPLE COULD HAVE NOTICED. OR THE OPPOSITE, BEING SO FIGETY OR RESTLESS THAT YOU HAVE BEEN MOVING AROUND A LOT MORE THAN USUAL: SEVERAL DAYS
3. TROUBLE FALLING OR STAYING ASLEEP: MORE THAN HALF THE DAYS
10. IF YOU CHECKED OFF ANY PROBLEMS, HOW DIFFICULT HAVE THESE PROBLEMS MADE IT FOR YOU TO DO YOUR WORK, TAKE CARE OF THINGS AT HOME, OR GET ALONG WITH OTHER PEOPLE: VERY DIFFICULT
SUM OF ALL RESPONSES TO PHQ QUESTIONS 1-9: 15
6. FEELING BAD ABOUT YOURSELF - OR THAT YOU ARE A FAILURE OR HAVE LET YOURSELF OR YOUR FAMILY DOWN: SEVERAL DAYS
7. TROUBLE CONCENTRATING ON THINGS, SUCH AS READING THE NEWSPAPER OR WATCHING TELEVISION: NEARLY EVERY DAY
SUM OF ALL RESPONSES TO PHQ QUESTIONS 1-9: 15
9. THOUGHTS THAT YOU WOULD BE BETTER OFF DEAD, OR OF HURTING YOURSELF: NOT AT ALL
SUM OF ALL RESPONSES TO PHQ QUESTIONS 1-9: 15
10. IF YOU CHECKED OFF ANY PROBLEMS, HOW DIFFICULT HAVE THESE PROBLEMS MADE IT FOR YOU TO DO YOUR WORK, TAKE CARE OF THINGS AT HOME, OR GET ALONG WITH OTHER PEOPLE: VERY DIFFICULT
1. LITTLE INTEREST OR PLEASURE IN DOING THINGS: MORE THAN HALF THE DAYS
7. TROUBLE CONCENTRATING ON THINGS, SUCH AS READING THE NEWSPAPER OR WATCHING TELEVISION: NEARLY EVERY DAY
2. FEELING DOWN, DEPRESSED OR HOPELESS: MORE THAN HALF THE DAYS
SUM OF ALL RESPONSES TO PHQ QUESTIONS 1-9: 15
2. FEELING DOWN, DEPRESSED OR HOPELESS: MORE THAN HALF THE DAYS
5. POOR APPETITE OR OVEREATING: SEVERAL DAYS
9. THOUGHTS THAT YOU WOULD BE BETTER OFF DEAD, OR OF HURTING YOURSELF: NOT AT ALL
8. MOVING OR SPEAKING SO SLOWLY THAT OTHER PEOPLE COULD HAVE NOTICED. OR THE OPPOSITE - BEING SO FIDGETY OR RESTLESS THAT YOU HAVE BEEN MOVING AROUND A LOT MORE THAN USUAL: SEVERAL DAYS
SUM OF ALL RESPONSES TO PHQ QUESTIONS 1-9: 15
6. FEELING BAD ABOUT YOURSELF - OR THAT YOU ARE A FAILURE OR HAVE LET YOURSELF OR YOUR FAMILY DOWN: SEVERAL DAYS
1. LITTLE INTEREST OR PLEASURE IN DOING THINGS: MORE THAN HALF THE DAYS
5. POOR APPETITE OR OVEREATING: SEVERAL DAYS
4. FEELING TIRED OR HAVING LITTLE ENERGY: NEARLY EVERY DAY
4. FEELING TIRED OR HAVING LITTLE ENERGY: NEARLY EVERY DAY
3. TROUBLE FALLING OR STAYING ASLEEP: MORE THAN HALF THE DAYS

## 2025-06-12 NOTE — TELEPHONE ENCOUNTER
Left message to schedule echo and nuclear stress test.  Electronically signed by Romi Randolph on 6/12/2025 at 2:38 PM

## 2025-06-12 NOTE — PROGRESS NOTES
patient (or guardian, if applicable) and other individuals in attendance with the patient were advised that Artificial Intelligence will be utilized during this visit to record, process the conversation to generate a clinical note, and support improvement of the AI technology. The patient (or guardian, if applicable) and other individuals in attendance at the appointment consented to the use of AI, including the recording.

## 2025-06-15 DIAGNOSIS — F41.9 ANXIETY: ICD-10-CM

## 2025-06-16 NOTE — TELEPHONE ENCOUNTER
Last Appointment:  6/12/2025  Future Appointments   Date Time Provider Department Center   7/15/2025  8:00 AM [facility]   7/15/2025  8:30 AM [facility] DEXA [facility]   8/14/2025  8:00 AM Joao Ba MD [facility] DE[facility]

## 2025-06-17 ENCOUNTER — TELEPHONE (OUTPATIENT)
Dept: VASCULAR SURGERY | Age: 55
End: 2025-06-17

## 2025-06-17 NOTE — TELEPHONE ENCOUNTER
Received referral from Dr. Allison for vascular lesion of the skin, left message for patient to schedule appointment to see Dr. Newell.

## 2025-06-23 ENCOUNTER — TELEPHONE (OUTPATIENT)
Dept: CARDIOLOGY | Age: 55
End: 2025-06-23

## 2025-07-02 ENCOUNTER — TELEPHONE (OUTPATIENT)
Dept: CARDIOLOGY | Age: 55
End: 2025-07-02

## 2025-07-02 ENCOUNTER — TELEPHONE (OUTPATIENT)
Dept: FAMILY MEDICINE CLINIC | Age: 55
End: 2025-07-02

## 2025-07-02 NOTE — TELEPHONE ENCOUNTER
I ordered regular exercise stress test without imaging since insurance wont approve nuclear.

## 2025-07-02 NOTE — TELEPHONE ENCOUNTER
[facility] Gisela      Calling about Patient Nuclear Stress test.  [id number]( order code)  it will need a Peer to Peer to Authorize.  Case #  [id number]  Phone  7     Another option is to order Exercise stress test  [id number](order code) this is walking test but without the imaging.   This test does not need prior Auth.

## 2025-07-02 NOTE — TELEPHONE ENCOUNTER
Spoke w/ Dr. Ba's office and gave information for peer to peer.  Also advised that he could order a regular stress test and that we would not need to get prior authorization for that.  Office staff will provide him with information and states she will call us back with any questions/information.

## 2025-07-03 ENCOUNTER — TELEPHONE (OUTPATIENT)
Dept: CARDIOLOGY | Age: 55
End: 2025-07-03

## 2025-07-03 NOTE — TELEPHONE ENCOUNTER
Left message to inform patient that insurance denied Nuclear stress test and that PCP would like the patient to have a regular stress test instead.  Patient will still have scheduled ECHO on 7/10 at 10:00 and then will have stress test after.  Asked patient to call back and confirm she is okay with changes.

## 2025-07-07 ENCOUNTER — TELEPHONE (OUTPATIENT)
Dept: CARDIOLOGY | Age: 55
End: 2025-07-07

## 2025-07-07 NOTE — TELEPHONE ENCOUNTER
Spoke with patient to schedule treadmill test. She will call us back to schedule.  Electronically signed by Romi Randolph on 7/7/2025 at 12:19 PM

## 2025-07-15 DIAGNOSIS — E78.00 ELEVATED LDL CHOLESTEROL LEVEL: ICD-10-CM

## 2025-07-15 LAB
CHOLESTEROL, TOTAL: 233 MG/DL
HDLC SERPL-MCNC: 44 MG/DL
LDL CHOLESTEROL: 169 MG/DL
TRIGL SERPL-MCNC: 102 MG/DL
VLDLC SERPL CALC-MCNC: 20 MG/DL

## 2025-07-21 NOTE — TELEPHONE ENCOUNTER
Last Appointment:  6/12/2025  Future Appointments   Date Time Provider Department Center   7/30/2025  9:45 AM Milla Newell MD [facility]/MED [facility]   8/14/2025  8:00 AM Joao Ba MD [facility] DEP

## 2025-07-22 RX ORDER — AMLODIPINE AND BENAZEPRIL HYDROCHLORIDE 5; 20 MG/1; MG/1
1 CAPSULE ORAL DAILY
Qty: 90 CAPSULE | Refills: 1 | Status: SHIPPED | OUTPATIENT
Start: 2025-07-22

## 2025-07-28 NOTE — TELEPHONE ENCOUNTER
Last Appointment:  6/12/2025  Future Appointments   Date Time Provider Department Center   7/30/2025  9:45 AM Milla Newell MD [facility]   8/14/2025  8:00 AM Joao Ba MD [facility] DEP

## 2025-07-29 RX ORDER — OMEPRAZOLE 20 MG/1
20 CAPSULE, DELAYED RELEASE ORAL DAILY
Qty: 90 CAPSULE | Refills: 1 | Status: SHIPPED | OUTPATIENT
Start: 2025-07-29

## 2025-07-30 ENCOUNTER — TELEPHONE (OUTPATIENT)
Dept: VASCULAR SURGERY | Age: 55
End: 2025-07-30

## 2025-07-30 ENCOUNTER — OFFICE VISIT (OUTPATIENT)
Dept: VASCULAR SURGERY | Age: 55
End: 2025-07-30
Payer: COMMERCIAL

## 2025-07-30 VITALS — SYSTOLIC BLOOD PRESSURE: 132 MMHG | OXYGEN SATURATION: 99 % | HEART RATE: 82 BPM | DIASTOLIC BLOOD PRESSURE: 84 MMHG

## 2025-07-30 DIAGNOSIS — R21 RASH: Primary | ICD-10-CM

## 2025-07-30 PROCEDURE — 99203 OFFICE O/P NEW LOW 30 MIN: CPT

## 2025-07-30 PROCEDURE — 3079F DIAST BP 80-89 MM HG: CPT

## 2025-07-30 PROCEDURE — 3075F SYST BP GE 130 - 139MM HG: CPT

## 2025-07-30 RX ORDER — MAGNESIUM 30 MG
30 TABLET ORAL 2 TIMES DAILY
COMMUNITY

## 2025-07-30 NOTE — PROGRESS NOTES
Vascular Surgery Outpatient Consultation      Chief Complaint   Patient presents with    Surgical Consult     Vascular lesion of the skin, tiny red spots on arms, legs, abdomen.       Reason for Consult:  vascular lesions    Requesting Physician:  Dr. Ba    HISTORY OF PRESENT ILLNESS:                The patient is a 54 y.o. female who states she has been getting red and dark spots on her body for about 7 years.  They appear randomly and last for about one day.  They do not hurt and no pruritus.  She has a history of an autoimmune disease and follows with rheumatology.  They do not feel the spots are related to her autoimmune disease.  She has seen dermatology in the past but not for several years.  She is a current smoker.     Past Medical History:        Diagnosis Date    Anxiety     Depression     Herniated disc     Hx of Raynaud's syndrome 01/25/2021    Hypertension     Peripheral neuropathy 01/25/2021    Pneumonia     Raynaud's disease     TMJ (dislocation of temporomandibular joint)      Past Surgical History:        Procedure Laterality Date    HYSTERECTOMY (CERVIX STATUS UNKNOWN)      OVARIAN CYST REMOVAL      benign mass removal    OVARY REMOVAL      SKIN BIOPSY Left     left hand     Current Medications:   Prior to Admission medications    Medication Sig Start Date End Date Taking? Authorizing Provider   magnesium 30 MG tablet Take 1 tablet by mouth 2 times daily   Yes Provider, MD Katerin   omeprazole (PRILOSEC) 20 MG delayed release capsule TAKE ONE CAPSULE BY MOUTH EVERY DAY 7/29/25  Yes Joao Ba MD   amLODIPine-benazepril (LOTREL) 5-20 MG per capsule TAKE ONE CAPSULE BY MOUTH EVERY DAY 7/22/25  Yes Joao Ba MD   FLUoxetine (PROZAC) 20 MG capsule TAKE ONE CAPSULE BY MOUTH EVERY MORNING THEN TAKE TWO CAPSULES BY MOUTH EVERY EVENING 6/16/25  Yes Joao Ba MD   celecoxib (CELEBREX) 400 MG capsule Take 200 mg by mouth 2 times daily   Yes Provider,

## 2025-07-30 NOTE — TELEPHONE ENCOUNTER
Left message, [facility] does not accept medicaid insurance, please call your insurance provider to find a dermatologist that does take medicaid.  Call the office with information.

## 2025-08-01 ENCOUNTER — TELEPHONE (OUTPATIENT)
Dept: VASCULAR SURGERY | Age: 55
End: 2025-08-01

## 2025-08-01 NOTE — TELEPHONE ENCOUNTER
Left message for patient to call the office with name of dermatology practice that she wants the referral faxed to.

## 2025-08-04 ENCOUNTER — TELEPHONE (OUTPATIENT)
Dept: VASCULAR SURGERY | Age: 55
End: 2025-08-04

## 2025-08-13 ENCOUNTER — TELEPHONE (OUTPATIENT)
Dept: FAMILY MEDICINE CLINIC | Age: 55
End: 2025-08-13

## 2025-08-13 DIAGNOSIS — R30.0 DYSURIA: ICD-10-CM

## 2025-08-13 DIAGNOSIS — R30.0 DYSURIA: Primary | ICD-10-CM

## 2025-08-17 LAB
CULTURE: ABNORMAL
SPECIMEN DESCRIPTION: ABNORMAL

## 2025-08-17 RX ORDER — CEPHALEXIN 500 MG/1
500 CAPSULE ORAL 3 TIMES DAILY
Qty: 21 CAPSULE | Refills: 0 | Status: SHIPPED | OUTPATIENT
Start: 2025-08-17 | End: 2025-08-24

## 2025-08-21 ENCOUNTER — TELEPHONE (OUTPATIENT)
Dept: CARDIOLOGY | Age: 55
End: 2025-08-21